# Patient Record
Sex: MALE | Race: WHITE | Employment: OTHER | URBAN - METROPOLITAN AREA
[De-identification: names, ages, dates, MRNs, and addresses within clinical notes are randomized per-mention and may not be internally consistent; named-entity substitution may affect disease eponyms.]

---

## 2018-04-07 ENCOUNTER — APPOINTMENT (OUTPATIENT)
Dept: CT IMAGING | Age: 83
End: 2018-04-07
Payer: COMMERCIAL

## 2018-04-07 ENCOUNTER — HOSPITAL ENCOUNTER (EMERGENCY)
Age: 83
Discharge: HOME | End: 2018-04-08
Attending: EMERGENCY MEDICINE
Payer: COMMERCIAL

## 2018-04-07 DIAGNOSIS — R50.9 FEVER, UNSPECIFIED FEVER CAUSE: ICD-10-CM

## 2018-04-07 DIAGNOSIS — J40 BRONCHITIS: Primary | ICD-10-CM

## 2018-04-07 LAB
ALBUMIN SERPL-MCNC: 3.7 G/DL (ref 3.5–5.2)
ALP BLD-CCNC: 67 U/L (ref 40–129)
ALT SERPL-CCNC: 9 U/L (ref 0–40)
ANION GAP SERPL CALCULATED.3IONS-SCNC: 14 MMOL/L (ref 7–16)
ANISOCYTOSIS: ABNORMAL
APTT: 29.3 SEC (ref 24.5–35.1)
AST SERPL-CCNC: 20 U/L (ref 0–39)
BACTERIA: ABNORMAL /HPF
BASOPHILS ABSOLUTE: 0.05 E9/L (ref 0–0.2)
BASOPHILS RELATIVE PERCENT: 0.5 % (ref 0–2)
BILIRUB SERPL-MCNC: 0.5 MG/DL (ref 0–1.2)
BILIRUBIN URINE: ABNORMAL
BLOOD, URINE: ABNORMAL
BUN BLDV-MCNC: 18 MG/DL (ref 8–23)
CALCIUM SERPL-MCNC: 9.2 MG/DL (ref 8.6–10.2)
CHLORIDE BLD-SCNC: 101 MMOL/L (ref 98–107)
CLARITY: CLEAR
CO2: 24 MMOL/L (ref 22–29)
COLOR: YELLOW
CREAT SERPL-MCNC: 1.1 MG/DL (ref 0.7–1.2)
EKG ATRIAL RATE: 277 BPM
EKG Q-T INTERVAL: 370 MS
EKG QRS DURATION: 78 MS
EKG QTC CALCULATION (BAZETT): 390 MS
EKG R AXIS: -23 DEGREES
EKG T AXIS: 29 DEGREES
EKG VENTRICULAR RATE: 67 BPM
EOSINOPHILS ABSOLUTE: 0.09 E9/L (ref 0.05–0.5)
EOSINOPHILS RELATIVE PERCENT: 1 % (ref 0–6)
EPITHELIAL CELLS, UA: ABNORMAL /HPF
GFR AFRICAN AMERICAN: >60
GFR NON-AFRICAN AMERICAN: >60 ML/MIN/1.73
GLUCOSE BLD-MCNC: 101 MG/DL (ref 74–109)
GLUCOSE URINE: NEGATIVE MG/DL
HCT VFR BLD CALC: 41 % (ref 37–54)
HEMOGLOBIN: 13.4 G/DL (ref 12.5–16.5)
INFLUENZA A BY PCR: NOT DETECTED
INFLUENZA B BY PCR: NOT DETECTED
INR BLD: 1.1
KETONES, URINE: 40 MG/DL
LACTIC ACID: 1 MMOL/L (ref 0.5–2.2)
LEUKOCYTE ESTERASE, URINE: NEGATIVE
LIPASE: 27 U/L (ref 13–60)
LYMPHOCYTES ABSOLUTE: 0.82 E9/L (ref 1.5–4)
LYMPHOCYTES RELATIVE PERCENT: 8.5 % (ref 20–42)
MCH RBC QN AUTO: 31.9 PG (ref 26–35)
MCHC RBC AUTO-ENTMCNC: 32.7 % (ref 32–34.5)
MCV RBC AUTO: 97.6 FL (ref 80–99.9)
MONOCYTES ABSOLUTE: 0.64 E9/L (ref 0.1–0.95)
MONOCYTES RELATIVE PERCENT: 6.5 % (ref 2–12)
NEUTROPHILS ABSOLUTE: 7.64 E9/L (ref 1.8–7.3)
NEUTROPHILS RELATIVE PERCENT: 83.5 % (ref 43–80)
NITRITE, URINE: NEGATIVE
PDW BLD-RTO: 13.8 FL (ref 11.5–15)
PH UA: 5 (ref 5–9)
PLATELET # BLD: 169 E9/L (ref 130–450)
PMV BLD AUTO: 10.4 FL (ref 7–12)
POTASSIUM SERPL-SCNC: 4.4 MMOL/L (ref 3.5–5)
PROTEIN UA: ABNORMAL MG/DL
PROTHROMBIN TIME: 12.2 SEC (ref 9.3–12.4)
RBC # BLD: 4.2 E12/L (ref 3.8–5.8)
RBC UA: ABNORMAL /HPF (ref 0–2)
SODIUM BLD-SCNC: 139 MMOL/L (ref 132–146)
SPECIFIC GRAVITY UA: >=1.03 (ref 1–1.03)
TOTAL PROTEIN: 6.9 G/DL (ref 6.4–8.3)
UROBILINOGEN, URINE: 0.2 E.U./DL
WBC # BLD: 9.1 E9/L (ref 4.5–11.5)
WBC UA: ABNORMAL /HPF (ref 0–5)

## 2018-04-07 PROCEDURE — 87088 URINE BACTERIA CULTURE: CPT

## 2018-04-07 PROCEDURE — 83605 ASSAY OF LACTIC ACID: CPT

## 2018-04-07 PROCEDURE — 85730 THROMBOPLASTIN TIME PARTIAL: CPT

## 2018-04-07 PROCEDURE — 94760 N-INVAS EAR/PLS OXIMETRY 1: CPT

## 2018-04-07 PROCEDURE — 87502 INFLUENZA DNA AMP PROBE: CPT

## 2018-04-07 PROCEDURE — 93005 ELECTROCARDIOGRAM TRACING: CPT | Performed by: EMERGENCY MEDICINE

## 2018-04-07 PROCEDURE — 83690 ASSAY OF LIPASE: CPT

## 2018-04-07 PROCEDURE — 81001 URINALYSIS AUTO W/SCOPE: CPT

## 2018-04-07 PROCEDURE — 2580000003 HC RX 258: Performed by: EMERGENCY MEDICINE

## 2018-04-07 PROCEDURE — 85025 COMPLETE CBC W/AUTO DIFF WBC: CPT

## 2018-04-07 PROCEDURE — 99285 EMERGENCY DEPT VISIT HI MDM: CPT

## 2018-04-07 PROCEDURE — 80053 COMPREHEN METABOLIC PANEL: CPT

## 2018-04-07 PROCEDURE — 87040 BLOOD CULTURE FOR BACTERIA: CPT

## 2018-04-07 PROCEDURE — 36415 COLL VENOUS BLD VENIPUNCTURE: CPT

## 2018-04-07 PROCEDURE — 85610 PROTHROMBIN TIME: CPT

## 2018-04-07 PROCEDURE — 6370000000 HC RX 637 (ALT 250 FOR IP): Performed by: EMERGENCY MEDICINE

## 2018-04-07 RX ORDER — SODIUM CHLORIDE 9 MG/ML
1000 INJECTION, SOLUTION INTRAVENOUS ONCE
Status: COMPLETED | OUTPATIENT
Start: 2018-04-07 | End: 2018-04-08

## 2018-04-07 RX ORDER — ACETAMINOPHEN 325 MG/1
650 TABLET ORAL ONCE
Status: COMPLETED | OUTPATIENT
Start: 2018-04-07 | End: 2018-04-07

## 2018-04-07 RX ADMIN — SODIUM CHLORIDE 1000 ML: 9 INJECTION, SOLUTION INTRAVENOUS at 22:49

## 2018-04-07 RX ADMIN — ACETAMINOPHEN 650 MG: 325 TABLET, FILM COATED ORAL at 22:49

## 2018-04-07 ASSESSMENT — ENCOUNTER SYMPTOMS
ABDOMINAL PAIN: 0
NAUSEA: 0
DIARRHEA: 0
EYE REDNESS: 0
WHEEZING: 0
BACK PAIN: 0
COUGH: 1
EYE DISCHARGE: 0
SINUS PRESSURE: 0
EYE PAIN: 0
SHORTNESS OF BREATH: 0
SORE THROAT: 0
VOMITING: 0

## 2018-04-07 ASSESSMENT — PAIN SCALES - GENERAL: PAINLEVEL_OUTOF10: 0

## 2018-04-08 ENCOUNTER — APPOINTMENT (OUTPATIENT)
Dept: GENERAL RADIOLOGY | Age: 83
End: 2018-04-08
Payer: COMMERCIAL

## 2018-04-08 VITALS
WEIGHT: 193 LBS | RESPIRATION RATE: 18 BRPM | HEART RATE: 95 BPM | DIASTOLIC BLOOD PRESSURE: 54 MMHG | HEIGHT: 69 IN | OXYGEN SATURATION: 95 % | BODY MASS INDEX: 28.58 KG/M2 | SYSTOLIC BLOOD PRESSURE: 111 MMHG | TEMPERATURE: 98.4 F

## 2018-04-08 PROCEDURE — 6370000000 HC RX 637 (ALT 250 FOR IP): Performed by: EMERGENCY MEDICINE

## 2018-04-08 PROCEDURE — 71045 X-RAY EXAM CHEST 1 VIEW: CPT

## 2018-04-08 PROCEDURE — 73502 X-RAY EXAM HIP UNI 2-3 VIEWS: CPT

## 2018-04-08 RX ORDER — AZITHROMYCIN 250 MG/1
500 TABLET, FILM COATED ORAL ONCE
Status: COMPLETED | OUTPATIENT
Start: 2018-04-08 | End: 2018-04-08

## 2018-04-08 RX ORDER — AZITHROMYCIN 250 MG/1
TABLET, FILM COATED ORAL
Qty: 1 PACKET | Refills: 0 | Status: SHIPPED | OUTPATIENT
Start: 2018-04-08 | End: 2018-04-18

## 2018-04-08 RX ADMIN — AZITHROMYCIN 500 MG: 250 TABLET, FILM COATED ORAL at 02:01

## 2018-04-10 LAB — URINE CULTURE, ROUTINE: NORMAL

## 2018-04-13 LAB
BLOOD CULTURE, ROUTINE: NORMAL
CULTURE, BLOOD 2: NORMAL

## 2018-07-14 ENCOUNTER — HOSPITAL ENCOUNTER (OUTPATIENT)
Dept: GENERAL RADIOLOGY | Age: 83
Discharge: HOME | End: 2018-07-16
Payer: COMMERCIAL

## 2018-07-14 ENCOUNTER — HOSPITAL ENCOUNTER (OUTPATIENT)
Age: 83
Discharge: HOME | End: 2018-07-16
Payer: COMMERCIAL

## 2018-07-14 DIAGNOSIS — R52 PAIN: ICD-10-CM

## 2018-07-14 DIAGNOSIS — R07.9 CHEST PAIN, UNSPECIFIED TYPE: ICD-10-CM

## 2018-07-14 PROCEDURE — 73030 X-RAY EXAM OF SHOULDER: CPT

## 2018-07-14 PROCEDURE — 71046 X-RAY EXAM CHEST 2 VIEWS: CPT

## 2018-07-14 PROCEDURE — 71100 X-RAY EXAM RIBS UNI 2 VIEWS: CPT

## 2019-09-09 LAB
AVERAGE GLUCOSE: NORMAL
CHOLESTEROL, TOTAL: 176 MG/DL
CHOLESTEROL/HDL RATIO: 2.6
CREATININE, URINE: 72.06
CREATININE: 1 MG/DL
HBA1C MFR BLD: 6.5 %
HDLC SERPL-MCNC: 67 MG/DL (ref 35–70)
LDL CHOLESTEROL CALCULATED: 99 MG/DL (ref 0–160)
MICROALBUMIN/CREAT 24H UR: 4.9 MG/G{CREAT}
MICROALBUMIN/CREAT UR-RTO: 68
POTASSIUM (K+): 4.1
TRIGL SERPL-MCNC: 48 MG/DL
VLDLC SERPL CALC-MCNC: 10 MG/DL

## 2020-03-17 VITALS — SYSTOLIC BLOOD PRESSURE: 118 MMHG | HEART RATE: 72 BPM | DIASTOLIC BLOOD PRESSURE: 70 MMHG | RESPIRATION RATE: 14 BRPM

## 2020-03-17 RX ORDER — ROSUVASTATIN CALCIUM 5 MG/1
5 TABLET, COATED ORAL DAILY
COMMUNITY
End: 2021-07-08

## 2020-03-17 RX ORDER — BUMETANIDE 1 MG/1
1 TABLET ORAL DAILY
COMMUNITY
End: 2020-12-30

## 2020-03-23 LAB
AVERAGE GLUCOSE: 143
AVERAGE GLUCOSE: 143
CHOLESTEROL, TOTAL: 190 MG/DL
CHOLESTEROL, TOTAL: 190 MG/DL
CHOLESTEROL/HDL RATIO: 3.4
CHOLESTEROL/HDL RATIO: 3.4
CREATININE: 1.2 MG/DL
CREATININE: 1.2 MG/DL
HBA1C MFR BLD: 6.6 %
HBA1C MFR BLD: 6.6 %
HDLC SERPL-MCNC: 56 MG/DL (ref 35–70)
HDLC SERPL-MCNC: 56 MG/DL (ref 35–70)
LDL CHOLESTEROL CALCULATED: 120 MG/DL (ref 0–160)
LDL CHOLESTEROL CALCULATED: 120 MG/DL (ref 0–160)
POTASSIUM (K+): 4.4
POTASSIUM (K+): 4.4
TRIGL SERPL-MCNC: 71 MG/DL
TRIGL SERPL-MCNC: 71 MG/DL
VLDLC SERPL CALC-MCNC: 14 MG/DL
VLDLC SERPL CALC-MCNC: 14 MG/DL

## 2020-06-12 ENCOUNTER — HOSPITAL ENCOUNTER (OUTPATIENT)
Dept: GENERAL RADIOLOGY | Age: 85
Discharge: HOME | End: 2020-06-14
Payer: MEDICARE

## 2020-06-12 ENCOUNTER — HOSPITAL ENCOUNTER (OUTPATIENT)
Age: 85
Discharge: HOME | End: 2020-06-14
Payer: MEDICARE

## 2020-06-12 PROCEDURE — 73502 X-RAY EXAM HIP UNI 2-3 VIEWS: CPT

## 2020-06-12 PROCEDURE — 73552 X-RAY EXAM OF FEMUR 2/>: CPT

## 2020-06-12 PROCEDURE — 73110 X-RAY EXAM OF WRIST: CPT

## 2020-09-15 LAB
INTERNATIONAL NORMALIZATION RATIO, POC: 1
PROTHROMBIN TIME, POC: 12.3

## 2020-09-16 ENCOUNTER — HOSPITAL ENCOUNTER (OUTPATIENT)
Age: 85
Discharge: HOME | End: 2020-09-18
Payer: MEDICARE

## 2020-09-16 VITALS
SYSTOLIC BLOOD PRESSURE: 124 MMHG | HEART RATE: 68 BPM | DIASTOLIC BLOOD PRESSURE: 80 MMHG | RESPIRATION RATE: 14 BRPM | WEIGHT: 183 LBS | BODY MASS INDEX: 27.02 KG/M2 | TEMPERATURE: 96.4 F

## 2020-09-16 LAB
ALBUMIN SERPL-MCNC: 3.8 G/DL (ref 3.5–5.2)
ALP BLD-CCNC: 60 U/L (ref 40–129)
ALT SERPL-CCNC: 9 U/L (ref 0–40)
ANION GAP SERPL CALCULATED.3IONS-SCNC: 18 MMOL/L (ref 7–16)
AST SERPL-CCNC: 23 U/L (ref 0–39)
BASOPHILS ABSOLUTE: 0.06 E9/L (ref 0–0.2)
BASOPHILS RELATIVE PERCENT: 0.7 % (ref 0–2)
BILIRUB SERPL-MCNC: 0.5 MG/DL (ref 0–1.2)
BUN BLDV-MCNC: 17 MG/DL (ref 8–23)
CALCIUM SERPL-MCNC: 10 MG/DL (ref 8.6–10.2)
CHLORIDE BLD-SCNC: 101 MMOL/L (ref 98–107)
CO2: 20 MMOL/L (ref 22–29)
CREAT SERPL-MCNC: 1.1 MG/DL (ref 0.7–1.2)
EOSINOPHILS ABSOLUTE: 0.22 E9/L (ref 0.05–0.5)
EOSINOPHILS RELATIVE PERCENT: 2.6 % (ref 0–6)
GFR AFRICAN AMERICAN: >60
GFR NON-AFRICAN AMERICAN: >60 ML/MIN/1.73
GLUCOSE BLD-MCNC: 150 MG/DL (ref 74–99)
HBA1C MFR BLD: 6.8 % (ref 4–5.6)
HCT VFR BLD CALC: 42.1 % (ref 37–54)
HEMOGLOBIN: 13.8 G/DL (ref 12.5–16.5)
IMMATURE GRANULOCYTES #: 0.07 E9/L
IMMATURE GRANULOCYTES %: 0.8 % (ref 0–5)
LYMPHOCYTES ABSOLUTE: 1.08 E9/L (ref 1.5–4)
LYMPHOCYTES RELATIVE PERCENT: 12.5 % (ref 20–42)
MCH RBC QN AUTO: 32.5 PG (ref 26–35)
MCHC RBC AUTO-ENTMCNC: 32.8 % (ref 32–34.5)
MCV RBC AUTO: 99.3 FL (ref 80–99.9)
MONOCYTES ABSOLUTE: 0.89 E9/L (ref 0.1–0.95)
MONOCYTES RELATIVE PERCENT: 10.3 % (ref 2–12)
NEUTROPHILS ABSOLUTE: 6.3 E9/L (ref 1.8–7.3)
NEUTROPHILS RELATIVE PERCENT: 73.1 % (ref 43–80)
PDW BLD-RTO: 14.3 FL (ref 11.5–15)
PLATELET # BLD: 206 E9/L (ref 130–450)
PMV BLD AUTO: 11.3 FL (ref 7–12)
POTASSIUM SERPL-SCNC: 4.3 MMOL/L (ref 3.5–5)
RBC # BLD: 4.24 E12/L (ref 3.8–5.8)
SODIUM BLD-SCNC: 139 MMOL/L (ref 132–146)
TOTAL PROTEIN: 6.7 G/DL (ref 6.4–8.3)
WBC # BLD: 8.6 E9/L (ref 4.5–11.5)

## 2020-09-16 PROCEDURE — 85025 COMPLETE CBC W/AUTO DIFF WBC: CPT

## 2020-09-16 PROCEDURE — 83036 HEMOGLOBIN GLYCOSYLATED A1C: CPT

## 2020-09-16 PROCEDURE — 80053 COMPREHEN METABOLIC PANEL: CPT

## 2020-09-16 PROCEDURE — 36415 COLL VENOUS BLD VENIPUNCTURE: CPT

## 2020-11-04 ENCOUNTER — TELEPHONE (OUTPATIENT)
Dept: FAMILY MEDICINE CLINIC | Age: 85
End: 2020-11-04

## 2020-12-30 ENCOUNTER — OFFICE VISIT (OUTPATIENT)
Dept: FAMILY MEDICINE CLINIC | Age: 85
End: 2020-12-30
Payer: MEDICARE

## 2020-12-30 VITALS
DIASTOLIC BLOOD PRESSURE: 80 MMHG | OXYGEN SATURATION: 94 % | BODY MASS INDEX: 29.41 KG/M2 | HEIGHT: 66 IN | HEART RATE: 50 BPM | SYSTOLIC BLOOD PRESSURE: 138 MMHG | WEIGHT: 183 LBS

## 2020-12-30 LAB
INTERNATIONAL NORMALIZATION RATIO, POC: 1.3
PROTHROMBIN TIME, POC: NORMAL

## 2020-12-30 PROCEDURE — 99213 OFFICE O/P EST LOW 20 MIN: CPT | Performed by: INTERNAL MEDICINE

## 2020-12-30 PROCEDURE — 85610 PROTHROMBIN TIME: CPT | Performed by: INTERNAL MEDICINE

## 2020-12-30 SDOH — ECONOMIC STABILITY: TRANSPORTATION INSECURITY
IN THE PAST 12 MONTHS, HAS LACK OF TRANSPORTATION KEPT YOU FROM MEETINGS, WORK, OR FROM GETTING THINGS NEEDED FOR DAILY LIVING?: NO

## 2020-12-30 SDOH — ECONOMIC STABILITY: FOOD INSECURITY: WITHIN THE PAST 12 MONTHS, THE FOOD YOU BOUGHT JUST DIDN'T LAST AND YOU DIDN'T HAVE MONEY TO GET MORE.: NEVER TRUE

## 2020-12-30 SDOH — ECONOMIC STABILITY: INCOME INSECURITY: HOW HARD IS IT FOR YOU TO PAY FOR THE VERY BASICS LIKE FOOD, HOUSING, MEDICAL CARE, AND HEATING?: NOT ASKED

## 2020-12-30 SDOH — ECONOMIC STABILITY: FOOD INSECURITY: WITHIN THE PAST 12 MONTHS, YOU WORRIED THAT YOUR FOOD WOULD RUN OUT BEFORE YOU GOT MONEY TO BUY MORE.: NEVER TRUE

## 2020-12-30 SDOH — ECONOMIC STABILITY: TRANSPORTATION INSECURITY
IN THE PAST 12 MONTHS, HAS THE LACK OF TRANSPORTATION KEPT YOU FROM MEDICAL APPOINTMENTS OR FROM GETTING MEDICATIONS?: NO

## 2020-12-30 ASSESSMENT — PATIENT HEALTH QUESTIONNAIRE - PHQ9
SUM OF ALL RESPONSES TO PHQ9 QUESTIONS 1 & 2: 0
2. FEELING DOWN, DEPRESSED OR HOPELESS: 0
1. LITTLE INTEREST OR PLEASURE IN DOING THINGS: 0
SUM OF ALL RESPONSES TO PHQ QUESTIONS 1-9: 0

## 2020-12-30 NOTE — PROGRESS NOTES
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 Tono Woodward  Patient is noncompliant with medications  He is not taking his Coumadin regularly  He has refused Xarelto or Eliquis  Those medications also not safe if he does not take them regularly  He is not sure if he is taking Metformin  His last A1c was 6.6%  No recent fall or injury  Uses a cane to walk    Past Medical History:   Diagnosis Date    A-fib [facility])     BPH (benign prostatic hyperplasia)     Diabetes mellitus ([facility]     Hyperlipidemia     Hypertension     Osteoarthritis     Renal cyst     Type 2 diabetes mellitus without complication ([facility]     Unspecified cerebral artery occlusion with cerebral infarction 10/2010    mild stroke        Social History     Socioeconomic History    Marital status:       Spouse name: Not on file    Number of children: Not on file    Years of education: Not on file    Highest education level: Not on file   Occupational History    Not on file   Social Needs    Financial resource strain: Not on file    Food insecurity     Worry: Never true     Inability: Never true    Transportation needs     Medical: No     Non-medical: No   Tobacco Use    Smoking status: Former Smoker     Packs/day: 1.00     Years: 30.00     Pack years: 30.00     Quit date: 3/17/1998     Years since quittin.8    Smokeless tobacco: Former User     Quit date: 2015   Substance and Sexual Activity    Alcohol use: Yes     Comment: 2 beers daily    Drug use: No    Sexual activity: Not on file   Lifestyle    Physical activity     Days per week: Not on file     Minutes per session: Not on file    Stress: Not on file   Relationships    Social connections     Talks on phone: Not on file     Gets together: Not on file Attends Cheondo[employer] service: Not on file     Active member of club or organization: Not on file     Attends meetings of clubs or organizations: Not on file     Relationship status: Not on file    Intimate partner violence     Fear of current or ex partner: Not on file     Emotionally abused: Not on file     Physically abused: Not on file     Forced sexual activity: Not on file   Other Topics Concern    Not on file   Social History Narrative    Not on file        Past Surgical History:   Procedure Laterality Date    CATARACT REMOVAL WITH IMPLANT Right 11 12 2013    CATARACT REMOVAL WITH IMPLANT Left 03/11/13    EYE SURGERY Right 11/15/13    dislocated intraocular lens    JOINT REPLACEMENT      left hip        Family History   Problem Relation Age of Onset    Asthma Mother     High Blood Pressure Mother     Stroke Mother         Allergies   Allergen Reactions    Lipitor [Atorvastatin Calcium] Other (See Comments)     Muscle cramps    Vytorin [Ezetimibe-Simvastatin]      Leg Cramps          ROS  No acute distress  Cardiac: Denies any chest pain or palpitation history of A. fib seen by Dr. Rosa Ortiz in 2019  Respiratory: Denies any cough or shortness of breath  GI: No abdominal pain. Denies any nausea vomiting or diarrhea  : Denies any dysuria frequency or hematuria history of BPH seen by Dr. Karen Blue  Neuro: No headache or dizziness  Endocrine: diabetes noncompliant  Skin: normal  No recent weight gain or weight loss  Denies any change in vision    Objective:    /80   Pulse 50   Ht 5' 6\" (1.676 m)   Wt 183 lb (83 kg)   SpO2 94%   BMI 29.54 kg/m²     Constitutional: Alert awake and oriented  Eyes: Pupils equal bilaterally. Extraocular muscles intact  Neck: no JVD adenopathy no bruit  Heart:  RRR, no murmurs, gallops, or rubs.   Lungs:    no wheeze, rales or rhonchi  Abd: bowel sounds present, nontender, nondistended, no masses  Extrem:  No clubbing, cyanosis, or edema  Neuro: AAOx3,No Focal deficit  Gait was steady with a cane  Psychological: no depression or anxiety       Current Outpatient Medications   Medication Sig Dispense Refill    rosuvastatin (CRESTOR) 5 MG tablet Take 5 mg by mouth daily      warfarin (COUMADIN) 5 MG tablet Take 5 mg by mouth daily      brimonidine-timolol (COMBIGAN) 0.2-0.5 % ophthalmic solution Place 1 drop into the right eye every 12 hours. 1 Bottle 1    ramipril (ALTACE) 5 MG capsule Take 5 mg by mouth daily.  terazosin (HYTRIN) 5 MG capsule Take 5 mg by mouth nightly. No current facility-administered medications for this visit.          Last 3 BMP  Lab Results   Component Value Date/Time     09/16/2020 03:20 PM     04/07/2018 10:40 PM     09/12/2017 02:45 PM    K 4.3 09/16/2020 03:20 PM    K 4.4 03/23/2020    K 4.4 03/23/2020    K 4.1 09/09/2019    K 4.4 04/07/2018 10:40 PM    K 4.3 09/12/2017 02:45 PM     09/16/2020 03:20 PM     04/07/2018 10:40 PM    CL 99 09/12/2017 02:45 PM    CO2 20 (L) 09/16/2020 03:20 PM    CO2 24 04/07/2018 10:40 PM    CO2 22 09/12/2017 02:45 PM    BUN 17 09/16/2020 03:20 PM    BUN 18 04/07/2018 10:40 PM    BUN 21 09/12/2017 02:45 PM    CREATININE 1.1 09/16/2020 03:20 PM    CREATININE 1.2 03/23/2020    CREATININE 1.2 03/23/2020    CREATININE 1.0 09/09/2019    GLUCOSE 150 (H) 09/16/2020 03:20 PM    GLUCOSE 101 04/07/2018 10:40 PM    GLUCOSE 94 09/12/2017 02:45 PM    GLUCOSE 137 (H) 03/05/2012 08:54 AM    GLUCOSE 87 03/18/2011 07:05 AM    GLUCOSE 117 (H) 12/29/2010 08:37 AM    CALCIUM 10.0 09/16/2020 03:20 PM    CALCIUM 9.2 04/07/2018 10:40 PM    CALCIUM 9.3 09/12/2017 02:45 PM       Last 3 CMP:    Lab Results   Component Value Date/Time     09/16/2020 03:20 PM     04/07/2018 10:40 PM     09/12/2017 02:45 PM    K 4.3 09/16/2020 03:20 PM    K 4.4 03/23/2020    K 4.4 03/23/2020    K 4.1 09/09/2019    K 4.4 04/07/2018 10:40 PM    K 4.3 09/12/2017 02:45 PM     09/16/2020 03:20 PM    CL 101 04/07/2018 10:40 PM    CL 99 09/12/2017 02:45 PM    CO2 20 (L) 09/16/2020 03:20 PM    CO2 24 04/07/2018 10:40 PM    CO2 22 09/12/2017 02:45 PM    BUN 17 09/16/2020 03:20 PM    BUN 18 04/07/2018 10:40 PM    BUN 21 09/12/2017 02:45 PM    CREATININE 1.1 09/16/2020 03:20 PM    CREATININE 1.2 03/23/2020    CREATININE 1.2 03/23/2020    CREATININE 1.0 09/09/2019    GLUCOSE 150 (H) 09/16/2020 03:20 PM    GLUCOSE 101 04/07/2018 10:40 PM    GLUCOSE 94 09/12/2017 02:45 PM    GLUCOSE 137 (H) 03/05/2012 08:54 AM    GLUCOSE 87 03/18/2011 07:05 AM    GLUCOSE 117 (H) 12/29/2010 08:37 AM    CALCIUM 10.0 09/16/2020 03:20 PM    CALCIUM 9.2 04/07/2018 10:40 PM    CALCIUM 9.3 09/12/2017 02:45 PM    PROT 6.7 09/16/2020 03:20 PM    PROT 6.9 04/07/2018 10:40 PM    PROT 6.6 09/12/2017 02:45 PM    LABALBU 3.8 09/16/2020 03:20 PM    LABALBU 3.7 04/07/2018 10:40 PM    LABALBU 3.7 09/12/2017 02:45 PM    LABALBU 4.1 03/05/2012 08:54 AM    LABALBU 4.1 12/29/2010 08:37 AM    BILITOT 0.5 09/16/2020 03:20 PM    BILITOT 0.5 04/07/2018 10:40 PM    BILITOT 0.4 09/12/2017 02:45 PM    ALKPHOS 60 09/16/2020 03:20 PM    ALKPHOS 67 04/07/2018 10:40 PM    ALKPHOS 66 09/12/2017 02:45 PM    AST 23 09/16/2020 03:20 PM    AST 20 04/07/2018 10:40 PM    AST 18 09/12/2017 02:45 PM    ALT 9 09/16/2020 03:20 PM    ALT 9 04/07/2018 10:40 PM    ALT 10 09/12/2017 02:45 PM        CBC:   Lab Results   Component Value Date/Time    WBC 8.6 09/16/2020 03:20 PM    RBC 4.24 09/16/2020 03:20 PM    HGB 13.8 09/16/2020 03:20 PM    HCT 42.1 09/16/2020 03:20 PM    MCV 99.3 09/16/2020 03:20 PM    MCH 32.5 09/16/2020 03:20 PM    MCHC 32.8 09/16/2020 03:20 PM    RDW 14.3 09/16/2020 03:20 PM     09/16/2020 03:20 PM    MPV 11.3 09/16/2020 03:20 PM       A1C:  Lab Results   Component Value Date/Time    LABA1C 6.8 (H) 09/16/2020 03:20 PM       Lipid panel:  Lab Results   Component Value Date    CHOL 190 03/23/2020    CHOL 190 03/23/2020    CHOL 176 09/09/2019    TRIG 71 03/23/2020    TRIG 71 03/23/2020    TRIG 48 09/09/2019    HDL 56 03/23/2020    HDL 56 03/23/2020    HDL 67 09/09/2019        Lab Results   Component Value Date/Time    PROT 6.7 09/16/2020 03:20 PM    PROT 6.9 04/07/2018 10:40 PM    PROT 6.6 09/12/2017 02:45 PM    INR 1.3 12/30/2020 12:24 PM    INR 1.0 09/15/2020    INR 1.1 04/07/2018 10:40 PM    INR 1.7 09/12/2017 02:45 PM    INR 1.5 03/29/2017 07:34 AM       Lab Results   Component Value Date/Time    MG 1.7 09/12/2017 02:45 PM         Assessment. Kodi Farmer was seen today for 3 month follow-up. Diagnoses and all orders for this visit:    Paroxysmal A-fib ([address]  -     POCT INR; Standing  -     ENROLLMENT FOR ANTICOAGULATION MONITORING  -     POCT INR    Controlled type 2 diabetes mellitus without complication, without long-term current use of insulin (HCC)    Hyperlipidemia, unspecified hyperlipidemia type    Benign prostatic hyperplasia without lower urinary tract symptoms    Essential hypertension    Pacemaker       Patient Active Problem List   Diagnosis    Right cataract    Dislocated IOL (intraocular lens), posterior    Cataract of left eye       Plan: Is supposed to take Coumadin 5 mg alternating with 6 mg he is not doing it regularly  Advised to take 6 mg daily he has 5 mg tablet and 2 mg tablet  Be more compliant risk of stroke discussed  He is refusing Xarelto or Eliquis if he is noncompliant with that that is also risky    Bring medication bottles every visit    Patient was advised his daughter call me if any question so we can discuss    Check PT/INR once a month  Follow with a cardiologist  Blood work next visit    Return in about 3 months (around 3/30/2021).        Gayarti Swift MD  12:50 PM  12/30/2020     [place]

## 2021-03-05 RX ORDER — WARFARIN SODIUM 2 MG/1
1 TABLET ORAL DAILY
Qty: 45 TABLET | Refills: 1 | Status: SHIPPED
Start: 2021-03-05 | End: 2021-08-23

## 2021-03-05 RX ORDER — WARFARIN SODIUM 5 MG/1
5 TABLET ORAL DAILY
Qty: 90 TABLET | Refills: 1 | Status: SHIPPED
Start: 2021-03-05 | End: 2021-08-23

## 2021-03-09 ENCOUNTER — OFFICE VISIT (OUTPATIENT)
Dept: FAMILY MEDICINE CLINIC | Age: 86
End: 2021-03-09
Payer: MEDICARE

## 2021-03-09 VITALS
OXYGEN SATURATION: 95 % | BODY MASS INDEX: 29.54 KG/M2 | WEIGHT: 183 LBS | DIASTOLIC BLOOD PRESSURE: 70 MMHG | TEMPERATURE: 97.8 F | HEART RATE: 61 BPM | SYSTOLIC BLOOD PRESSURE: 110 MMHG

## 2021-03-09 DIAGNOSIS — I48.20 CHRONIC ATRIAL FIBRILLATION (HCC): ICD-10-CM

## 2021-03-09 DIAGNOSIS — E11.9 TYPE 2 DIABETES MELLITUS WITHOUT COMPLICATION, WITHOUT LONG-TERM CURRENT USE OF INSULIN (HCC): ICD-10-CM

## 2021-03-09 DIAGNOSIS — E78.5 HYPERLIPIDEMIA, UNSPECIFIED HYPERLIPIDEMIA TYPE: ICD-10-CM

## 2021-03-09 DIAGNOSIS — I48.0 PAROXYSMAL A-FIB (HCC): ICD-10-CM

## 2021-03-09 DIAGNOSIS — M19.90 INFLAMMATORY ARTHRITIS: ICD-10-CM

## 2021-03-09 DIAGNOSIS — I10 ESSENTIAL HYPERTENSION: Primary | ICD-10-CM

## 2021-03-09 DIAGNOSIS — Z12.5 PROSTATE CANCER SCREENING: ICD-10-CM

## 2021-03-09 LAB
INTERNATIONAL NORMALIZATION RATIO, POC: 1.2
PROTHROMBIN TIME, POC: NORMAL

## 2021-03-09 PROCEDURE — 85610 PROTHROMBIN TIME: CPT | Performed by: INTERNAL MEDICINE

## 2021-03-09 PROCEDURE — 99213 OFFICE O/P EST LOW 20 MIN: CPT | Performed by: INTERNAL MEDICINE

## 2021-03-09 RX ORDER — PREDNISONE 1 MG/1
10 TABLET ORAL DAILY
Qty: 15 TABLET | Refills: 0 | Status: SHIPPED | OUTPATIENT
Start: 2021-03-09 | End: 2021-03-14

## 2021-03-09 ASSESSMENT — PATIENT HEALTH QUESTIONNAIRE - PHQ9
SUM OF ALL RESPONSES TO PHQ QUESTIONS 1-9: 0
SUM OF ALL RESPONSES TO PHQ9 QUESTIONS 1 & 2: 0
1. LITTLE INTEREST OR PLEASURE IN DOING THINGS: 0
SUM OF ALL RESPONSES TO PHQ QUESTIONS 1-9: 0
2. FEELING DOWN, DEPRESSED OR HOPELESS: 0
SUM OF ALL RESPONSES TO PHQ QUESTIONS 1-9: 0

## 2021-03-09 NOTE — PROGRESS NOTES
Subjective:     Chief Complaint   Patient presents with    Finger Pain   complains of pain in the right hand it is contracted fingers very stiff in the morning appears to have severe arthritis  he cannot open the bottle uses a cane to walk    follow-up on atrial fibrillation he is not taking his Coumadin as prescribed he missed it for few days    I have discussed with him to try Eliquis or Xarelto he refused the pain advised to talk to the cardiologist  patient like to stay on the Coumadin he says do a better job she understand the risk of stroke his INR has been subtherapeutic    he has not had his Coumadin checked for some time advised him to check in 2 weeks    I have discussed on many visits to stop the Coumadin and go on Eliquis or Xarelto he declined again today he says he will miss those also further not safe       Past Medical History:   Diagnosis Date    [facility]fib ([facility]     BPH (benign prostatic hyperplasia)     Diabetes mellitus ([facility]     Hyperlipidemia     Hypertension     Osteoarthritis     Renal cyst     Type 2 diabetes mellitus without complication ([facility]     Unspecified cerebral artery occlusion with cerebral infarction 10/2010    mild stroke        Social History     Socioeconomic History    Marital status:       Spouse name: Not on file    Number of children: Not on file    Years of education: Not on file    Highest education level: Not on file   Occupational History    Not on file   Social Needs    Financial resource strain: Not on file    Food insecurity     Worry: Never true     Inability: Never true    Transportation needs     Medical: No     Non-medical: No   Tobacco Use    Smoking status: Former Smoker     Packs/day: 1.00     Years: 30.00     Pack years: 30.00     Quit date: 3/17/1998     Years since quittin.9    Smokeless tobacco: Former User     Quit date: 2015   Substance and Sexual Activity    Alcohol use: Yes     Comment: 2 beers daily    Drug use: No    Sexual activity: Not on file   Lifestyle    Physical activity     Days per week: Not on file     Minutes per session: Not on file    Stress: Not on file   Relationships    Social connections     Talks on phone: Not on file     Gets together: Not on file     Attends Congregational service: Not on file     Active member of club or organization: Not on file     Attends meetings of clubs or organizations: Not on file     Relationship status: Not on file    Intimate partner violence     Fear of current or ex partner: Not on file     Emotionally abused: Not on file     Physically abused: Not on file     Forced sexual activity: Not on file   Other Topics Concern    Not on file   Social History Narrative    Not on file        Past Surgical History:   Procedure Laterality Date    CATARACT REMOVAL WITH IMPLANT Right 11 12 2013    CATARACT REMOVAL WITH IMPLANT Left 03/11/13    EYE SURGERY Right 11/15/13    dislocated intraocular lens    JOINT REPLACEMENT      left hip        Family History   Problem Relation Age of Onset    Asthma Mother     High Blood Pressure Mother     Stroke Mother         Allergies   Allergen Reactions    Lipitor [Atorvastatin Calcium] Other (See Comments)     Muscle cramps    Vytorin [Ezetimibe-Simvastatin]      Leg Cramps          ROS  No acute distress  Cardiac: Denies any chest pain or palpitation  Respiratory: Denies any cough or shortness of breath  GI: No abdominal pain. Denies any nausea vomiting or diarrhea  : Denies any dysuria frequency or hematuria  Neuro: No headache or dizziness  Endocrine: No diabetes  Skin: normal  No recent weight gain or weight loss  Denies any change in vision  musculoskeletal joint pains worse in both hands right worse than left  Objective:    /70   Pulse 61   Temp 97.8 °F (36.6 °C)   Wt 183 lb (83 kg)   SpO2 95%   BMI 29.54 kg/m²     Constitutional: Alert awake and oriented  Eyes: Pupils equal bilaterally.  Extraocular muscles intact  Neck: no GLUCOSE 87 03/18/2011 07:05 AM    GLUCOSE 117 (H) 12/29/2010 08:37 AM    CALCIUM 10.0 09/16/2020 03:20 PM    CALCIUM 9.2 04/07/2018 10:40 PM    CALCIUM 9.3 09/12/2017 02:45 PM       Last 3 CMP:    Lab Results   Component Value Date/Time     09/16/2020 03:20 PM     04/07/2018 10:40 PM     09/12/2017 02:45 PM    K 4.3 09/16/2020 03:20 PM    K 4.4 03/23/2020    K 4.4 03/23/2020    K 4.1 09/09/2019    K 4.4 04/07/2018 10:40 PM    K 4.3 09/12/2017 02:45 PM     09/16/2020 03:20 PM     04/07/2018 10:40 PM    CL 99 09/12/2017 02:45 PM    CO2 20 (L) 09/16/2020 03:20 PM    CO2 24 04/07/2018 10:40 PM    CO2 22 09/12/2017 02:45 PM    BUN 17 09/16/2020 03:20 PM    BUN 18 04/07/2018 10:40 PM    BUN 21 09/12/2017 02:45 PM    CREATININE 1.1 09/16/2020 03:20 PM    CREATININE 1.2 03/23/2020    CREATININE 1.2 03/23/2020    CREATININE 1.0 09/09/2019    GLUCOSE 150 (H) 09/16/2020 03:20 PM    GLUCOSE 101 04/07/2018 10:40 PM    GLUCOSE 94 09/12/2017 02:45 PM    GLUCOSE 137 (H) 03/05/2012 08:54 AM    GLUCOSE 87 03/18/2011 07:05 AM    GLUCOSE 117 (H) 12/29/2010 08:37 AM    CALCIUM 10.0 09/16/2020 03:20 PM    CALCIUM 9.2 04/07/2018 10:40 PM    CALCIUM 9.3 09/12/2017 02:45 PM    PROT 6.7 09/16/2020 03:20 PM    PROT 6.9 04/07/2018 10:40 PM    PROT 6.6 09/12/2017 02:45 PM    LABALBU 3.8 09/16/2020 03:20 PM    LABALBU 3.7 04/07/2018 10:40 PM    LABALBU 3.7 09/12/2017 02:45 PM    LABALBU 4.1 03/05/2012 08:54 AM    LABALBU 4.1 12/29/2010 08:37 AM    BILITOT 0.5 09/16/2020 03:20 PM    BILITOT 0.5 04/07/2018 10:40 PM    BILITOT 0.4 09/12/2017 02:45 PM    ALKPHOS 60 09/16/2020 03:20 PM    ALKPHOS 67 04/07/2018 10:40 PM    ALKPHOS 66 09/12/2017 02:45 PM    AST 23 09/16/2020 03:20 PM    AST 20 04/07/2018 10:40 PM    AST 18 09/12/2017 02:45 PM    ALT 9 09/16/2020 03:20 PM    ALT 9 04/07/2018 10:40 PM    ALT 10 09/12/2017 02:45 PM        CBC:   Lab Results   Component Value Date/Time    WBC 8.6 09/16/2020 03:20 PM    RBC DAILY       Patient Active Problem List   Diagnosis    Right cataract    Dislocated IOL (intraocular lens), posterior    Cataract of left eye       Plan: Try Medrol Dosepak  to take the Coumadin as prescribed check pro time in 2 weeks he takes 5 mg daily for 5 days a week and 7 mg Wednesday and Sunday check in 2 weeks he promised to do so risk of stroke discussed if not follow-up properly    prednisone taper dose see if that helps the arthritis in the hands and swollen joints: Check sed rate CRP and rheumatoid factor    Diabetes control check A1c    Get complete blood work before next visit    Advised the patient daughter to call me so I can discuss with her    Return in about 3 months (around 6/9/2021).        Renny Dickey MD  3:47 PM  3/9/2021     [place]

## 2021-03-11 ENCOUNTER — TELEPHONE (OUTPATIENT)
Dept: FAMILY MEDICINE CLINIC | Age: 86
End: 2021-03-11

## 2021-04-22 ENCOUNTER — HOSPITAL ENCOUNTER (EMERGENCY)
Age: 86
Discharge: HOME | End: 2021-04-22
Attending: EMERGENCY MEDICINE
Payer: MEDICARE

## 2021-04-22 VITALS
TEMPERATURE: 98.4 F | BODY MASS INDEX: 29.54 KG/M2 | OXYGEN SATURATION: 96 % | WEIGHT: 183 LBS | SYSTOLIC BLOOD PRESSURE: 184 MMHG | RESPIRATION RATE: 16 BRPM | DIASTOLIC BLOOD PRESSURE: 118 MMHG | HEART RATE: 60 BPM

## 2021-04-22 DIAGNOSIS — R35.0 URINARY FREQUENCY: Primary | ICD-10-CM

## 2021-04-22 LAB
BACTERIA: NORMAL /HPF
BILIRUBIN URINE: NEGATIVE
BLOOD, URINE: ABNORMAL
CLARITY: CLEAR
COLOR: YELLOW
GLUCOSE URINE: NEGATIVE MG/DL
KETONES, URINE: ABNORMAL MG/DL
LEUKOCYTE ESTERASE, URINE: NEGATIVE
NITRITE, URINE: NEGATIVE
PH UA: 5.5 (ref 5–9)
PROTEIN UA: NEGATIVE MG/DL
RBC UA: NORMAL /HPF (ref 0–2)
SPECIFIC GRAVITY UA: >=1.03 (ref 1–1.03)
UROBILINOGEN, URINE: 0.2 E.U./DL
WBC UA: NORMAL /HPF (ref 0–5)

## 2021-04-22 PROCEDURE — 51798 US URINE CAPACITY MEASURE: CPT

## 2021-04-22 PROCEDURE — 99283 EMERGENCY DEPT VISIT LOW MDM: CPT

## 2021-04-22 PROCEDURE — 81001 URINALYSIS AUTO W/SCOPE: CPT

## 2021-04-22 ASSESSMENT — ENCOUNTER SYMPTOMS
BACK PAIN: 0
SORE THROAT: 0
WHEEZING: 0
SINUS PRESSURE: 0
ABDOMINAL PAIN: 0
DIARRHEA: 0
VOMITING: 0
SHORTNESS OF BREATH: 0
EYE PAIN: 0
COUGH: 0
NAUSEA: 0
EYE DISCHARGE: 0
EYE REDNESS: 0

## 2021-04-22 NOTE — ED NOTES
Patient voided, urine sample collected and labeled at bedside with 2 identifiers, US completed to measure urine left- 32 ml - Dr. Meryle Jarvis aware.       Karine Gonzalez RN  04/22/21 [id number]

## 2021-04-22 NOTE — ED PROVIDER NOTES
Patient is an 81 y/o male who presents to the ED via EMS with difficulty urinating. Patient states that he has difficulty urinating in the mornings. He states that it usually takes him 3 times and then it will \"let loose. \" He states that he was up all night. He did urinate this morning. He denies any abdominal pain or suprapubic pressure. He denies any fever or flank pain. He denies any dysuria or gross hematuria. He denies any history of prostate problems. Review of Systems   Constitutional: Negative for chills and fever. HENT: Negative for ear pain, sinus pressure and sore throat. Eyes: Negative for pain, discharge and redness. Respiratory: Negative for cough, shortness of breath and wheezing. Cardiovascular: Negative for chest pain. Gastrointestinal: Negative for abdominal pain, diarrhea, nausea and vomiting. Genitourinary: Positive for difficulty urinating. Negative for dysuria and frequency. Musculoskeletal: Negative for arthralgias and back pain. Skin: Negative for rash and wound. Neurological: Negative for weakness and headaches. Hematological: Negative for adenopathy. All other systems reviewed and are negative. Physical Exam  Vitals signs and nursing note reviewed. Constitutional:       General: He is not in acute distress. HENT:      Head: Normocephalic and atraumatic. Right Ear: External ear normal.      Left Ear: External ear normal.      Nose: Nose normal.      Mouth/Throat:      Mouth: Mucous membranes are moist.   Eyes:      Conjunctiva/sclera: Conjunctivae normal.      Pupils: Pupils are equal, round, and reactive to light. Neck:      Musculoskeletal: Normal range of motion and neck supple. Cardiovascular:      Rate and Rhythm: Normal rate and regular rhythm. Heart sounds: No murmur. Pulmonary:      Effort: Pulmonary effort is normal. No respiratory distress. Breath sounds: Normal breath sounds. No stridor.  No wheezing, rhonchi or Ur Negative Negative mg/dL    Bilirubin Urine Negative Negative    Ketones, Urine TRACE (A) Negative mg/dL    Specific Gravity, UA >=1.030 1.005 - 1.030    Blood, Urine TRACE (A) Negative    pH, UA 5.5 5.0 - 9.0    Protein, UA Negative Negative mg/dL    Urobilinogen, Urine 0.2 <2.0 E.U./dL    Nitrite, Urine Negative Negative    Leukocyte Esterase, Urine Negative Negative       Radiology:  No orders to display       ------------------------- NURSING NOTES AND VITALS REVIEWED ---------------------------  Date / Time Roomed:  4/22/2021  6:17 AM  ED Bed Assignment:  08/08    The nursing notes within the ED encounter and vital signs as below have been reviewed. BP (!) 184/118   Pulse 60   Temp 98.4 °F (36.9 °C) (Oral)   Resp 16   Wt 183 lb (83 kg)   SpO2 96%   BMI 29.54 kg/m²   Oxygen Saturation Interpretation: Normal      ------------------------------------------ PROGRESS NOTES ------------------------------------------  I have spoken with the patient and discussed todays results, in addition to providing specific details for the plan of care and counseling regarding the diagnosis and prognosis. Their questions are answered at this time and they are agreeable with the plan. I discussed at length with them reasons for immediate return here for re evaluation. They will followup with primary care by calling their office tomorrow. --------------------------------- ADDITIONAL PROVIDER NOTES ---------------------------------  At this time the patient is without objective evidence of an acute process requiring hospitalization or inpatient management. They have remained hemodynamically stable throughout their entire ED visit and are stable for discharge with outpatient follow-up. The plan has been discussed in detail and they are aware of the specific conditions for emergent return, as well as the importance of follow-up. New Prescriptions    No medications on file       Diagnosis:  1.  Urinary frequency        Disposition:  Patient's disposition: Discharge to home  Patient's condition is stable.          [address] [facility]  04/22/21 [id number]

## 2021-04-28 LAB — DIABETIC RETINOPATHY: POSITIVE

## 2021-07-08 ENCOUNTER — OFFICE VISIT (OUTPATIENT)
Dept: FAMILY MEDICINE CLINIC | Age: 86
End: 2021-07-08
Payer: MEDICARE

## 2021-07-08 VITALS
WEIGHT: 173 LBS | DIASTOLIC BLOOD PRESSURE: 80 MMHG | OXYGEN SATURATION: 96 % | TEMPERATURE: 97.5 F | BODY MASS INDEX: 27.92 KG/M2 | HEART RATE: 63 BPM | SYSTOLIC BLOOD PRESSURE: 136 MMHG

## 2021-07-08 DIAGNOSIS — R73.9 HYPERGLYCEMIA: ICD-10-CM

## 2021-07-08 DIAGNOSIS — E78.5 HYPERLIPIDEMIA, UNSPECIFIED HYPERLIPIDEMIA TYPE: ICD-10-CM

## 2021-07-08 DIAGNOSIS — I10 ESSENTIAL HYPERTENSION: ICD-10-CM

## 2021-07-08 DIAGNOSIS — I48.0 PAROXYSMAL A-FIB (HCC): Primary | ICD-10-CM

## 2021-07-08 DIAGNOSIS — Z12.5 PROSTATE CANCER SCREENING: ICD-10-CM

## 2021-07-08 DIAGNOSIS — M19.90 INFLAMMATORY ARTHRITIS: ICD-10-CM

## 2021-07-08 LAB
INTERNATIONAL NORMALIZATION RATIO, POC: 1.1
PROTHROMBIN TIME, POC: NORMAL

## 2021-07-08 PROCEDURE — 85610 PROTHROMBIN TIME: CPT | Performed by: INTERNAL MEDICINE

## 2021-07-08 PROCEDURE — 99213 OFFICE O/P EST LOW 20 MIN: CPT | Performed by: INTERNAL MEDICINE

## 2021-07-08 RX ORDER — RAMIPRIL 2.5 MG/1
CAPSULE ORAL
COMMUNITY
Start: 2021-04-02 | End: 2021-08-23 | Stop reason: SDUPTHER

## 2021-07-08 NOTE — PROGRESS NOTES
Subjective:     Chief Complaint   Patient presents with    Joint Pain   Patient complains of generalized joint pains he was supposed to get the blood work he did not do it    He says he forgets to take his Coumadin many times  When he takes it he is taking 6 mg every day  He brought his bottles today he has Coumadin 5 mg and 2 mg  He takes 5 mg past half of 2 mg  His INR has been low  He is not protected  He says he forgets many times when he goes to his daughter    He has a daughter who is a physician assistant I told him I can talk to her  He says she is too busy running for clinics  I cannot contact her    I tried to call his daughter Velvet Tinoco  [phone]  Could not contact that number  Tried with different options  Dialing 1 before that  It says that the area code before dialing  Was not able to contact her  To get information and talk to her about the father's condition    Past Medical History:   Diagnosis Date    A-fib [facility])     BPH (benign prostatic hyperplasia)     Diabetes mellitus ([facility]     Hyperlipidemia     Hypertension     Osteoarthritis     Renal cyst     Type 2 diabetes mellitus without complication ([facility]     Unspecified cerebral artery occlusion with cerebral infarction 10/2010    mild stroke        Social History     Socioeconomic History    Marital status:       Spouse name: Not on file    Number of children: Not on file    Years of education: Not on file    Highest education level: Not on file   Occupational History    Not on file   Tobacco Use    Smoking status: Former Smoker     Packs/day: 1.00     Years: 30.00     Pack years: 30.00     Quit date: 3/17/1998     Years since quittin.3    Smokeless tobacco: Former User     Quit date: 2015   Substance and Sexual Activity    Alcohol use: Yes     Comment: 2 beers daily    Drug use: No    Sexual activity: Not on file   Other Topics Concern    Not on file   Social History Narrative    Not on file     Social Determinants of Health     Financial Resource Strain:     Difficulty of Paying Living Expenses:    Food Insecurity: No Food Insecurity    Worried About Running Out of Food in the Last Year: Never true    Ran Out of Food in the Last Year: Never true   Transportation Needs: No Transportation Needs    Lack of Transportation (Medical): No    Lack of Transportation (Non-Medical): No   Physical Activity:     Days of Exercise per Week:     Minutes of Exercise per Session:    Stress:     Feeling of Stress :    Social Connections:     Frequency of Communication with Friends and Family:     Frequency of Social Gatherings with Friends and Family:     Attends Jehovah's witness Services:     Active Member of Clubs or Organizations:     Attends Club or Organization Meetings:     Marital Status:    Intimate Partner Violence:     Fear of Current or Ex-Partner:     Emotionally Abused:     Physically Abused:     Sexually Abused:         Past Surgical History:   Procedure Laterality Date    CATARACT REMOVAL WITH IMPLANT Right 11 12 2013    CATARACT REMOVAL WITH IMPLANT Left 03/11/13    EYE SURGERY Right 11/15/13    dislocated intraocular lens    JOINT REPLACEMENT      left hip        Family History   Problem Relation Age of Onset    Asthma Mother     High Blood Pressure Mother     Stroke Mother         Allergies   Allergen Reactions    Lipitor [Atorvastatin Calcium] Other (See Comments)     Muscle cramps    Vytorin [Ezetimibe-Simvastatin]      Leg Cramps          ROS  No acute distress  Cardiac: Denies any chest pain or palpitation  Atrial fibrillation has seen Dr. Celsa Montoya in the past  He has refused cardiac catheterization to Dr. Celsa Montoya  Pacemaker 2017 last seen Dr. Celsa Montoya in 2019 has not followed up  Respiratory: Denies any cough or shortness of breath  GI: No abdominal pain. Denies any nausea vomiting or diarrhea  He has declined colonoscopy many times  : Denies any dysuria frequency or hematuria  Neuro:  No headache or dizziness  Endocrine: No diabetes  Skin: normal  No recent weight gain or weight loss  Denies any change in vision    Objective:    /80   Pulse 63   Temp 97.5 °F (36.4 °C)   Wt 173 lb (78.5 kg)   SpO2 96%   BMI 27.92 kg/m²     Constitutional: Alert awake and oriented  Eyes: Pupils equal bilaterally. Extraocular muscles intact  Neck: no JVD adenopathy no bruit  Heart:  RRR, no murmurs, gallops, or rubs. Lungs:    no wheeze, rales or rhonchi  Abd: bowel sounds present, nontender, nondistended, no masses  Extrem:  No clubbing, cyanosis, or edema  Neuro: AAOx3,No Focal deficit  Carotid ultrasound March 2017 less than 50%  Walks with a cane due to arthritis  Psychological: no depression or anxiety   Arthritis both sides  Both wrist    Current Outpatient Medications   Medication Sig Dispense Refill    ramipril (ALTACE) 2.5 MG capsule TAKE ONE CAPSULE BY MOUTH EVERY DAY      warfarin (COUMADIN) 5 MG tablet Take 1 tablet by mouth daily 90 tablet 1    warfarin (COUMADIN) 2 MG tablet Take 0.5 tablets by mouth daily 45 tablet 1    brimonidine-timolol (COMBIGAN) 0.2-0.5 % ophthalmic solution Place 1 drop into the right eye every 12 hours. 1 Bottle 1     No current facility-administered medications for this visit.         Last 3 BMP  Lab Results   Component Value Date/Time     09/16/2020 03:20 PM     04/07/2018 10:40 PM     09/12/2017 02:45 PM    K 4.3 09/16/2020 03:20 PM    K 4.4 03/23/2020 12:00 AM    K 4.4 03/23/2020 12:00 AM    K 4.1 09/09/2019 12:00 AM    K 4.4 04/07/2018 10:40 PM    K 4.3 09/12/2017 02:45 PM     09/16/2020 03:20 PM     04/07/2018 10:40 PM    CL 99 09/12/2017 02:45 PM    CO2 20 (L) 09/16/2020 03:20 PM    CO2 24 04/07/2018 10:40 PM    CO2 22 09/12/2017 02:45 PM    BUN 17 09/16/2020 03:20 PM    BUN 18 04/07/2018 10:40 PM    BUN 21 09/12/2017 02:45 PM    CREATININE 1.1 09/16/2020 03:20 PM    CREATININE 1.2 03/23/2020 12:00 AM    CREATININE 1.2 03/23/2020 12:00 AM    CREATININE 1.0 09/09/2019 12:00 AM    GLUCOSE 150 (H) 09/16/2020 03:20 PM    GLUCOSE 101 04/07/2018 10:40 PM    GLUCOSE 94 09/12/2017 02:45 PM    GLUCOSE 137 (H) 03/05/2012 08:54 AM    GLUCOSE 87 03/18/2011 07:05 AM    GLUCOSE 117 (H) 12/29/2010 08:37 AM    CALCIUM 10.0 09/16/2020 03:20 PM    CALCIUM 9.2 04/07/2018 10:40 PM    CALCIUM 9.3 09/12/2017 02:45 PM       Last 3 CMP:    Lab Results   Component Value Date/Time     09/16/2020 03:20 PM     04/07/2018 10:40 PM     09/12/2017 02:45 PM    K 4.3 09/16/2020 03:20 PM    K 4.4 03/23/2020 12:00 AM    K 4.4 03/23/2020 12:00 AM    K 4.1 09/09/2019 12:00 AM    K 4.4 04/07/2018 10:40 PM    K 4.3 09/12/2017 02:45 PM     09/16/2020 03:20 PM     04/07/2018 10:40 PM    CL 99 09/12/2017 02:45 PM    CO2 20 (L) 09/16/2020 03:20 PM    CO2 24 04/07/2018 10:40 PM    CO2 22 09/12/2017 02:45 PM    BUN 17 09/16/2020 03:20 PM    BUN 18 04/07/2018 10:40 PM    BUN 21 09/12/2017 02:45 PM    CREATININE 1.1 09/16/2020 03:20 PM    CREATININE 1.2 03/23/2020 12:00 AM    CREATININE 1.2 03/23/2020 12:00 AM    CREATININE 1.0 09/09/2019 12:00 AM    GLUCOSE 150 (H) 09/16/2020 03:20 PM    GLUCOSE 101 04/07/2018 10:40 PM    GLUCOSE 94 09/12/2017 02:45 PM    GLUCOSE 137 (H) 03/05/2012 08:54 AM    GLUCOSE 87 03/18/2011 07:05 AM    GLUCOSE 117 (H) 12/29/2010 08:37 AM    CALCIUM 10.0 09/16/2020 03:20 PM    CALCIUM 9.2 04/07/2018 10:40 PM    CALCIUM 9.3 09/12/2017 02:45 PM    PROT 6.7 09/16/2020 03:20 PM    PROT 6.9 04/07/2018 10:40 PM    PROT 6.6 09/12/2017 02:45 PM    LABALBU 3.8 09/16/2020 03:20 PM    LABALBU 3.7 04/07/2018 10:40 PM    LABALBU 3.7 09/12/2017 02:45 PM    LABALBU 4.1 03/05/2012 08:54 AM    LABALBU 4.1 12/29/2010 08:37 AM    BILITOT 0.5 09/16/2020 03:20 PM    BILITOT 0.5 04/07/2018 10:40 PM    BILITOT 0.4 09/12/2017 02:45 PM    ALKPHOS 60 09/16/2020 03:20 PM    ALKPHOS 67 04/07/2018 10:40 PM    ALKPHOS 66 09/12/2017 02:45 PM    AST 23 09/16/2020 03:20 PM    AST 20 04/07/2018 10:40 PM    AST 18 09/12/2017 02:45 PM    ALT 9 09/16/2020 03:20 PM    ALT 9 04/07/2018 10:40 PM    ALT 10 09/12/2017 02:45 PM        CBC:   Lab Results   Component Value Date/Time    WBC 8.6 09/16/2020 03:20 PM    RBC 4.24 09/16/2020 03:20 PM    HGB 13.8 09/16/2020 03:20 PM    HCT 42.1 09/16/2020 03:20 PM    MCV 99.3 09/16/2020 03:20 PM    MCH 32.5 09/16/2020 03:20 PM    MCHC 32.8 09/16/2020 03:20 PM    RDW 14.3 09/16/2020 03:20 PM     09/16/2020 03:20 PM    MPV 11.3 09/16/2020 03:20 PM       A1C:  Lab Results   Component Value Date/Time    LABA1C 6.8 (H) 09/16/2020 03:20 PM       Lipid panel:  Lab Results   Component Value Date    CHOL 190 03/23/2020    CHOL 190 03/23/2020    CHOL 176 09/09/2019    TRIG 71 03/23/2020    TRIG 71 03/23/2020    TRIG 48 09/09/2019    HDL 56 03/23/2020    HDL 56 03/23/2020    HDL 67 09/09/2019        Lab Results   Component Value Date/Time    PROT 6.7 09/16/2020 03:20 PM    PROT 6.9 04/07/2018 10:40 PM    PROT 6.6 09/12/2017 02:45 PM    INR 1.1 07/08/2021 04:10 PM    INR 1.2 03/09/2021 02:37 PM    INR 1.3 12/30/2020 12:24 PM    INR 1.1 04/07/2018 10:40 PM    INR 1.7 09/12/2017 02:45 PM    INR 1.5 03/29/2017 07:34 AM       Lab Results   Component Value Date/Time    MG 1.7 09/12/2017 02:45 PM         Assessment. Diana Cummings was seen today for joint pain. Diagnoses and all orders for this visit:    Paroxysmal A-fib ([address]  -     POCT INR    Inflammatory arthritis  -     CBC WITH AUTO DIFFERENTIAL; Future  -     COMPREHENSIVE METABOLIC PANEL; Future  -     C-REACTIVE PROTEIN; Future  -     Sedimentation Rate; Future  -     RHEUMATOID FACTOR; Future    Essential hypertension    Prostate cancer screening  -     PSA SCREENING; Future    Hyperlipidemia, unspecified hyperlipidemia type  -     COMPREHENSIVE METABOLIC PANEL; Future  -     LIPID PANEL; Future  -     TSH;  Future    Hyperglycemia  -     HEMOGLOBIN A1C; Future       Patient Active Problem List Diagnosis    Right cataract    Dislocated IOL (intraocular lens), posterior    Cataract of left eye    Essential hypertension    Inflammatory arthritis    Paroxysmal A-fib (HCC)    Hyperlipidemia    Hyperglycemia       Plan: Atrial fibrillation noncompliant with the Coumadin INR 1.1 today  He says he is taking 60 mg every day but  He misses many times  I discussed with him he should try Eliquis, he told me to talk to his daughter Aminta Lion I tried to contact her not able to contact on that number  [phone]  I called the patient [phone]  I will try it again    Hypertension controlled continue ramipril    History of diabetes check A1c    Arthritis rule out rheumatoid arthritis check sed rate CRP and rheumatoid factor      Need to check CMP and lipid profile    Fall precautions discussed      Check pro time in 1 week  If he does not go on Eliquis    Addendum  Finally I was able to get hold of him  He gave me phone number for his daughter Aminta Lion  [phone]  [facility]  [phone]    I called Aminta Lion and talk to her she is going to talk to her sister Romana Leon is a physician assistant and make a plan and get back to me    Return in about 3 months (around 10/8/2021).        Tami Mendoza MD  6:01 PM  7/8/2021     [place]

## 2021-07-09 ENCOUNTER — TELEPHONE (OUTPATIENT)
Dept: FAMILY MEDICINE CLINIC | Age: 86
End: 2021-07-09

## 2021-07-09 DIAGNOSIS — Z12.5 PROSTATE CANCER SCREENING: ICD-10-CM

## 2021-07-09 DIAGNOSIS — M19.90 INFLAMMATORY ARTHRITIS: ICD-10-CM

## 2021-07-09 DIAGNOSIS — R73.9 HYPERGLYCEMIA: ICD-10-CM

## 2021-07-09 DIAGNOSIS — E78.5 HYPERLIPIDEMIA, UNSPECIFIED HYPERLIPIDEMIA TYPE: ICD-10-CM

## 2021-07-09 LAB
ALBUMIN SERPL-MCNC: 4.2 G/DL (ref 3.5–5.2)
ALP BLD-CCNC: 76 U/L (ref 40–129)
ALT SERPL-CCNC: 6 U/L (ref 0–40)
ANION GAP SERPL CALCULATED.3IONS-SCNC: 18 MMOL/L (ref 7–16)
AST SERPL-CCNC: 17 U/L (ref 0–39)
BASOPHILS ABSOLUTE: 0.07 E9/L (ref 0–0.2)
BASOPHILS RELATIVE PERCENT: 0.8 % (ref 0–2)
BILIRUB SERPL-MCNC: 0.6 MG/DL (ref 0–1.2)
BUN BLDV-MCNC: 22 MG/DL (ref 6–23)
C-REACTIVE PROTEIN: 2.8 MG/DL (ref 0–0.4)
CALCIUM SERPL-MCNC: 10.3 MG/DL (ref 8.6–10.2)
CHLORIDE BLD-SCNC: 102 MMOL/L (ref 98–107)
CHOLESTEROL, TOTAL: 213 MG/DL (ref 0–199)
CO2: 20 MMOL/L (ref 22–29)
CREAT SERPL-MCNC: 1.2 MG/DL (ref 0.7–1.2)
EOSINOPHILS ABSOLUTE: 0.25 E9/L (ref 0.05–0.5)
EOSINOPHILS RELATIVE PERCENT: 2.8 % (ref 0–6)
GFR AFRICAN AMERICAN: >60
GFR NON-AFRICAN AMERICAN: 57 ML/MIN/1.73
GLUCOSE BLD-MCNC: 114 MG/DL (ref 74–99)
HBA1C MFR BLD: 6.8 % (ref 4–5.6)
HCT VFR BLD CALC: 45.4 % (ref 37–54)
HDLC SERPL-MCNC: 57 MG/DL
HEMOGLOBIN: 14.7 G/DL (ref 12.5–16.5)
IMMATURE GRANULOCYTES #: 0.07 E9/L
IMMATURE GRANULOCYTES %: 0.8 % (ref 0–5)
LDL CHOLESTEROL CALCULATED: 143 MG/DL (ref 0–99)
LYMPHOCYTES ABSOLUTE: 1.14 E9/L (ref 1.5–4)
LYMPHOCYTES RELATIVE PERCENT: 12.5 % (ref 20–42)
MCH RBC QN AUTO: 32 PG (ref 26–35)
MCHC RBC AUTO-ENTMCNC: 32.4 % (ref 32–34.5)
MCV RBC AUTO: 98.9 FL (ref 80–99.9)
MONOCYTES ABSOLUTE: 0.93 E9/L (ref 0.1–0.95)
MONOCYTES RELATIVE PERCENT: 10.2 % (ref 2–12)
NEUTROPHILS ABSOLUTE: 6.63 E9/L (ref 1.8–7.3)
NEUTROPHILS RELATIVE PERCENT: 72.9 % (ref 43–80)
PDW BLD-RTO: 14 FL (ref 11.5–15)
PLATELET # BLD: 255 E9/L (ref 130–450)
PMV BLD AUTO: 11 FL (ref 7–12)
POTASSIUM SERPL-SCNC: 4.8 MMOL/L (ref 3.5–5)
PROSTATE SPECIFIC ANTIGEN: 1.78 NG/ML (ref 0–4)
RBC # BLD: 4.59 E12/L (ref 3.8–5.8)
RHEUMATOID FACTOR: 10 IU/ML (ref 0–13)
SEDIMENTATION RATE, ERYTHROCYTE: 28 MM/HR (ref 0–15)
SODIUM BLD-SCNC: 140 MMOL/L (ref 132–146)
TOTAL PROTEIN: 7.7 G/DL (ref 6.4–8.3)
TRIGL SERPL-MCNC: 66 MG/DL (ref 0–149)
TSH SERPL DL<=0.05 MIU/L-ACNC: 3.48 UIU/ML (ref 0.27–4.2)
VLDLC SERPL CALC-MCNC: 13 MG/DL
WBC # BLD: 9.1 E9/L (ref 4.5–11.5)

## 2021-07-09 NOTE — TELEPHONE ENCOUNTER
Daughter Shital Cruz called wanting to talk to you about her fathers condition.     Shital Cruz  [phone]    Last seen 7/8/2021  Next appt 11/2/2021

## 2021-07-12 NOTE — TELEPHONE ENCOUNTER
I called the phone # [phone] to discuss her father's condition and left a message for she to call me

## 2021-07-28 ENCOUNTER — OFFICE VISIT (OUTPATIENT)
Dept: FAMILY MEDICINE CLINIC | Age: 86
End: 2021-07-28
Payer: MEDICARE

## 2021-07-28 ENCOUNTER — NURSE TRIAGE (OUTPATIENT)
Dept: OTHER | Facility: CLINIC | Age: 86
End: 2021-07-28

## 2021-07-28 VITALS
DIASTOLIC BLOOD PRESSURE: 70 MMHG | OXYGEN SATURATION: 97 % | WEIGHT: 177 LBS | TEMPERATURE: 97 F | SYSTOLIC BLOOD PRESSURE: 150 MMHG | BODY MASS INDEX: 28.57 KG/M2 | HEART RATE: 63 BPM

## 2021-07-28 DIAGNOSIS — E78.5 HYPERLIPIDEMIA, UNSPECIFIED HYPERLIPIDEMIA TYPE: ICD-10-CM

## 2021-07-28 DIAGNOSIS — I50.9 CONGESTIVE HEART FAILURE, UNSPECIFIED HF CHRONICITY, UNSPECIFIED HEART FAILURE TYPE (HCC): Primary | ICD-10-CM

## 2021-07-28 DIAGNOSIS — I48.0 PAROXYSMAL A-FIB (HCC): ICD-10-CM

## 2021-07-28 DIAGNOSIS — R60.0 EDEMA OF BOTH LEGS: ICD-10-CM

## 2021-07-28 DIAGNOSIS — Z95.0 PACEMAKER: ICD-10-CM

## 2021-07-28 LAB
INTERNATIONAL NORMALIZATION RATIO, POC: 1.7
PROTHROMBIN TIME, POC: NORMAL

## 2021-07-28 PROCEDURE — 85610 PROTHROMBIN TIME: CPT | Performed by: INTERNAL MEDICINE

## 2021-07-28 PROCEDURE — 99213 OFFICE O/P EST LOW 20 MIN: CPT | Performed by: INTERNAL MEDICINE

## 2021-07-28 RX ORDER — FUROSEMIDE 20 MG/1
20 TABLET ORAL DAILY
Qty: 60 TABLET | Refills: 3 | Status: SHIPPED
Start: 2021-07-28 | End: 2021-08-23 | Stop reason: SDUPTHER

## 2021-07-28 RX ORDER — PRAVASTATIN SODIUM 10 MG
10 TABLET ORAL DAILY
Qty: 30 TABLET | Refills: 5 | Status: SHIPPED | OUTPATIENT
Start: 2021-07-28 | End: 2022-02-22

## 2021-07-28 NOTE — PROGRESS NOTES
Subjective:     Chief Complaint   Patient presents with    Leg Swelling     right   complains of swelling both legs  finally he is taking Coumadin as prescribed  is taking 6 mg INR 1.7    he has history atrial fibrillation has been on Coumadin    I had spoken to his daughters that patient was noncompliant and forgetting to take meds, he tells me they called him every day morning and evening and remind him to take his  medication    Past Medical History:   Diagnosis Date    A-fib [facility])     BPH (benign prostatic hyperplasia)     Diabetes mellitus ([facility]     Hyperlipidemia     Hypertension     Osteoarthritis     Renal cyst     Type 2 diabetes mellitus without complication ([facility]     Unspecified cerebral artery occlusion with cerebral infarction 10/2010    mild stroke        Social History     Socioeconomic History    Marital status:      Spouse name: Not on file    Number of children: Not on file    Years of education: Not on file    Highest education level: Not on file   Occupational History    Not on file   Tobacco Use    Smoking status: Former Smoker     Packs/day: 1.00     Years: 30.00     Pack years: 30.00     Quit date: 3/17/1998     Years since quittin.3    Smokeless tobacco: Former User     Quit date: 2015   Substance and Sexual Activity    Alcohol use: Yes     Comment: 2 beers daily    Drug use: No    Sexual activity: Not on file   Other Topics Concern    Not on file   Social History Narrative    Not on file     Social Determinants of Health     Financial Resource Strain:     Difficulty of Paying Living Expenses:    Food Insecurity: No Food Insecurity    Worried About Running Out of Food in the Last Year: Never true    Shayy of Food in the Last Year: Never true   Transportation Needs: No Transportation Needs    Lack of Transportation (Medical): No    Lack of Transportation (Non-Medical):  No   Physical Activity:     Days of Exercise per Week:     Minutes of Exercise per Session:    Stress:     Feeling of Stress :    Social Connections:     Frequency of Communication with Friends and Family:     Frequency of Social Gatherings with Friends and Family:     Attends Jain Services:     Active Member of Clubs or Organizations:     Attends Club or Organization Meetings:     Marital Status:    Intimate Partner Violence:     Fear of Current or Ex-Partner:     Emotionally Abused:     Physically Abused:     Sexually Abused:         Past Surgical History:   Procedure Laterality Date    CATARACT REMOVAL WITH IMPLANT Right 11 12 2013    CATARACT REMOVAL WITH IMPLANT Left 03/11/13    EYE SURGERY Right 11/15/13    dislocated intraocular lens    JOINT REPLACEMENT      left hip        Family History   Problem Relation Age of Onset    Asthma Mother     High Blood Pressure Mother     Stroke Mother         Allergies   Allergen Reactions    Lipitor [Atorvastatin Calcium] Other (See Comments)     Muscle cramps    Vytorin [Ezetimibe-Simvastatin]      Leg Cramps          ROS  No acute distress  Cardiac: Denies any chest pain or palpitation  atrial fibrillation, has refused cardiac catheterization, pacemaker 2017, has another appointment to see him, saw him in 2019  Respiratory: Denies any cough or shortness of breath  GI: No abdominal pain. Denies any nausea vomiting or diarrhea  declined colonoscopy many times  : Denies any dysuria frequency or hematuria  Neuro: No headache or dizziness  Endocrine: No diabetes  Skin: normal  No recent weight gain or weight loss  Denies any change in vision    Objective:    BP (!) 150/70   Pulse 63   Temp 97 °F (36.1 °C)   Wt 177 lb (80.3 kg)   SpO2 97%   BMI 28.57 kg/m²     Constitutional: Alert awake and oriented  Eyes: Pupils equal bilaterally.  Extraocular muscles intact  Neck: no JVD adenopathy no bruit  Heart: Irregularly irregular, 1 or 6 systolic murmur, no S3  Lungs:    Decreased breath sounds right base  Abd: bowel sounds present, nontender, nondistended, no masses  Extrem:  No clubbing, cyanosis,     2+ edema right leg, 1+ on the left leg  Neuro: AAOx3,No Focal deficit  Psychological: no depression or anxiety         Carotid ultrasound March 2017 less than 50%  Walks with a cane due to arthritis        Current Outpatient Medications   Medication Sig Dispense Refill    furosemide (LASIX) 20 MG tablet Take 1 tablet by mouth daily 60 tablet 3    pravastatin (PRAVACHOL) 10 MG tablet Take 1 tablet by mouth daily 30 tablet 5    ramipril (ALTACE) 2.5 MG capsule TAKE ONE CAPSULE BY MOUTH EVERY DAY      warfarin (COUMADIN) 5 MG tablet Take 1 tablet by mouth daily 90 tablet 1    warfarin (COUMADIN) 2 MG tablet Take 0.5 tablets by mouth daily 45 tablet 1    brimonidine-timolol (COMBIGAN) 0.2-0.5 % ophthalmic solution Place 1 drop into the right eye every 12 hours. 1 Bottle 1     No current facility-administered medications for this visit.         Last 3 BMP  Lab Results   Component Value Date/Time     07/09/2021 09:54 AM     09/16/2020 03:20 PM     04/07/2018 10:40 PM    K 4.8 07/09/2021 09:54 AM    K 4.3 09/16/2020 03:20 PM    K 4.4 03/23/2020 12:00 AM    K 4.4 03/23/2020 12:00 AM    K 4.1 09/09/2019 12:00 AM    K 4.4 04/07/2018 10:40 PM     07/09/2021 09:54 AM     09/16/2020 03:20 PM     04/07/2018 10:40 PM    CO2 20 (L) 07/09/2021 09:54 AM    CO2 20 (L) 09/16/2020 03:20 PM    CO2 24 04/07/2018 10:40 PM    BUN 22 07/09/2021 09:54 AM    BUN 17 09/16/2020 03:20 PM    BUN 18 04/07/2018 10:40 PM    CREATININE 1.2 07/09/2021 09:54 AM    CREATININE 1.1 09/16/2020 03:20 PM    CREATININE 1.2 03/23/2020 12:00 AM    CREATININE 1.2 03/23/2020 12:00 AM    CREATININE 1.0 09/09/2019 12:00 AM    GLUCOSE 114 (H) 07/09/2021 09:54 AM    GLUCOSE 150 (H) 09/16/2020 03:20 PM    GLUCOSE 101 04/07/2018 10:40 PM    GLUCOSE 137 (H) 03/05/2012 08:54 AM    GLUCOSE 87 03/18/2011 07:05 AM    GLUCOSE 117 (H) 12/29/2010 08:37 AM CALCIUM 10.3 (H) 07/09/2021 09:54 AM    CALCIUM 10.0 09/16/2020 03:20 PM    CALCIUM 9.2 04/07/2018 10:40 PM       Last 3 CMP:    Lab Results   Component Value Date/Time     07/09/2021 09:54 AM     09/16/2020 03:20 PM     04/07/2018 10:40 PM    K 4.8 07/09/2021 09:54 AM    K 4.3 09/16/2020 03:20 PM    K 4.4 03/23/2020 12:00 AM    K 4.4 03/23/2020 12:00 AM    K 4.1 09/09/2019 12:00 AM    K 4.4 04/07/2018 10:40 PM     07/09/2021 09:54 AM     09/16/2020 03:20 PM     04/07/2018 10:40 PM    CO2 20 (L) 07/09/2021 09:54 AM    CO2 20 (L) 09/16/2020 03:20 PM    CO2 24 04/07/2018 10:40 PM    BUN 22 07/09/2021 09:54 AM    BUN 17 09/16/2020 03:20 PM    BUN 18 04/07/2018 10:40 PM    CREATININE 1.2 07/09/2021 09:54 AM    CREATININE 1.1 09/16/2020 03:20 PM    CREATININE 1.2 03/23/2020 12:00 AM    CREATININE 1.2 03/23/2020 12:00 AM    CREATININE 1.0 09/09/2019 12:00 AM    GLUCOSE 114 (H) 07/09/2021 09:54 AM    GLUCOSE 150 (H) 09/16/2020 03:20 PM    GLUCOSE 101 04/07/2018 10:40 PM    GLUCOSE 137 (H) 03/05/2012 08:54 AM    GLUCOSE 87 03/18/2011 07:05 AM    GLUCOSE 117 (H) 12/29/2010 08:37 AM    CALCIUM 10.3 (H) 07/09/2021 09:54 AM    CALCIUM 10.0 09/16/2020 03:20 PM    CALCIUM 9.2 04/07/2018 10:40 PM    PROT 7.7 07/09/2021 09:54 AM    PROT 6.7 09/16/2020 03:20 PM    PROT 6.9 04/07/2018 10:40 PM    LABALBU 4.2 07/09/2021 09:54 AM    LABALBU 3.8 09/16/2020 03:20 PM    LABALBU 3.7 04/07/2018 10:40 PM    LABALBU 4.1 03/05/2012 08:54 AM    LABALBU 4.1 12/29/2010 08:37 AM    BILITOT 0.6 07/09/2021 09:54 AM    BILITOT 0.5 09/16/2020 03:20 PM    BILITOT 0.5 04/07/2018 10:40 PM    ALKPHOS 76 07/09/2021 09:54 AM    ALKPHOS 60 09/16/2020 03:20 PM    ALKPHOS 67 04/07/2018 10:40 PM    AST 17 07/09/2021 09:54 AM    AST 23 09/16/2020 03:20 PM    AST 20 04/07/2018 10:40 PM    ALT 6 07/09/2021 09:54 AM    ALT 9 09/16/2020 03:20 PM    ALT 9 04/07/2018 10:40 PM        CBC:   Lab Results   Component Value Date/Time WBC 9.1 07/09/2021 09:54 AM    RBC 4.59 07/09/2021 09:54 AM    HGB 14.7 07/09/2021 09:54 AM    HCT 45.4 07/09/2021 09:54 AM    MCV 98.9 07/09/2021 09:54 AM    MCH 32.0 07/09/2021 09:54 AM    MCHC 32.4 07/09/2021 09:54 AM    RDW 14.0 07/09/2021 09:54 AM     07/09/2021 09:54 AM    MPV 11.0 07/09/2021 09:54 AM       A1C:  Lab Results   Component Value Date/Time    LABA1C 6.8 (H) 07/09/2021 09:54 AM       Lipid panel:  Lab Results   Component Value Date    CHOL 213 07/09/2021    CHOL 190 03/23/2020    CHOL 190 03/23/2020    TRIG 66 07/09/2021    TRIG 71 03/23/2020    TRIG 71 03/23/2020    HDL 57 07/09/2021    HDL 56 03/23/2020    HDL 56 03/23/2020        Lab Results   Component Value Date/Time    PROT 7.7 07/09/2021 09:54 AM    PROT 6.7 09/16/2020 03:20 PM    PROT 6.9 04/07/2018 10:40 PM    INR 1.7 07/28/2021 02:41 PM    INR 1.1 07/08/2021 04:10 PM    INR 1.2 03/09/2021 02:37 PM    INR 1.1 04/07/2018 10:40 PM    INR 1.7 09/12/2017 02:45 PM    INR 1.5 03/29/2017 07:34 AM       Lab Results   Component Value Date/Time    MG 1.7 09/12/2017 02:45 PM         Assessment. Aviva Juan was seen today for leg swelling. Diagnoses and all orders for this visit:    Congestive heart failure, unspecified HF chronicity, unspecified heart failure type ([facility]  -     XR CHEST (2 VW); Future    Paroxysmal A-fib (HCC)  -     POCT INR    Edema of both legs    Hyperlipidemia, unspecified hyperlipidemia type    Pacemaker    Other orders  -     furosemide (LASIX) 20 MG tablet; Take 1 tablet by mouth daily  -     pravastatin (PRAVACHOL) 10 MG tablet;  Take 1 tablet by mouth daily       Patient Active Problem List   Diagnosis    Right cataract    Dislocated IOL (intraocular lens), posterior    Cataract of left eye    Essential hypertension    Inflammatory arthritis    Paroxysmal A-fib ([address]    Hyperlipidemia    Hyperglycemia    Pacemaker       Plan: Edema both legs rule out CHF  Lasix 20 mg daily monitor BUN/creatinine potassium    x-ray chest PA and lateral    Atrial fibrillation rate controlled INR 1.7 finally improved take 7 mg today then 6 mg every day and 7 mg on Sunday    I had spoken to the daughter, I spoke to the daughter in July 08, 2021  see detailed progress note July 08, 2021    see the cardiologist have a pacemaker checked    Return in about 2 weeks (around 8/11/2021).        Elnita Osler, MD  3:37 PM  7/28/2021     [place]

## 2021-08-02 ENCOUNTER — TELEPHONE (OUTPATIENT)
Dept: FAMILY MEDICINE CLINIC | Age: 86
End: 2021-08-02

## 2021-08-02 NOTE — TELEPHONE ENCOUNTER
----- Message from Brady Sanabria sent at 7/31/2021  1:01 PM EDT -----  Subject: Appointment Request    Reason for Call: Routine Existing Condition Follow Up    QUESTIONS  Type of Appointment? Established Patient  Reason for appointment request? Available appointments did not meet   patient need  Additional Information for Provider? Patient needs a 2 week follow up. He   states  forgot to schedule him.  ---------------------------------------------------------------------------  --------------  Keren Grass INFO  What is the best way for the office to contact you? OK to leave message on   voicemail  Preferred Call Back Phone Number? [phone]  ---------------------------------------------------------------------------  --------------  SCRIPT ANSWERS  Relationship to Patient? Self  (Is the patient requesting to be seen urgently for their symptoms?)? No  Is this follow up request related to routine Diabetes Management? No  Are you having any new concerns about your existing condition? No  Have you been diagnosed with, awaiting test results for, or told that you   are suspected of having COVID-19 (Coronavirus)? (If patient has tested   negative or was tested as a requirement for work, school, or travel and   not based on symptoms, answer no)? No  Do you currently have flu-like symptoms including fever or chills, cough,   shortness of breath, difficulty breathing, or new loss of taste or smell? No  Have you had close contact with someone with COVID-19 in the last 14 days? No  (Service Expert - click yes below to proceed with [employer] As Usual   Scheduling)?  Yes

## 2021-08-03 ENCOUNTER — TELEPHONE (OUTPATIENT)
Dept: FAMILY MEDICINE CLINIC | Age: 86
End: 2021-08-03

## 2021-08-03 NOTE — TELEPHONE ENCOUNTER
----- Message from Jeronimo Huerta sent at 8/3/2021  1:55 PM EDT -----  Subject: Message to Provider    QUESTIONS  Information for Provider? patient daughter called because she has missed   calls from  about some concerned she has about her dad's   health and she wanted to inform  that she can be today before   5:15, tomorrow or Thursday.   ---------------------------------------------------------------------------  --------------  CALL BACK INFO  What is the best way for the office to contact you? OK to leave message on   voicemail  Preferred Call Back Phone Number? [phone]  ---------------------------------------------------------------------------  --------------  SCRIPT ANSWERS  Relationship to Patient? Other  Representative Name? Brenda Hall  Is the Representative on the appropriate HIPAA document in Epic?  Yes

## 2021-08-09 ENCOUNTER — OFFICE VISIT (OUTPATIENT)
Dept: FAMILY MEDICINE CLINIC | Age: 86
End: 2021-08-09
Payer: MEDICARE

## 2021-08-09 VITALS
TEMPERATURE: 97.3 F | HEART RATE: 65 BPM | WEIGHT: 174 LBS | BODY MASS INDEX: 28.08 KG/M2 | OXYGEN SATURATION: 96 % | DIASTOLIC BLOOD PRESSURE: 74 MMHG | SYSTOLIC BLOOD PRESSURE: 120 MMHG

## 2021-08-09 DIAGNOSIS — I48.0 PAROXYSMAL A-FIB (HCC): ICD-10-CM

## 2021-08-09 DIAGNOSIS — I50.9 CHRONIC CONGESTIVE HEART FAILURE, UNSPECIFIED HEART FAILURE TYPE (HCC): Primary | ICD-10-CM

## 2021-08-09 DIAGNOSIS — R73.9 HYPERGLYCEMIA: ICD-10-CM

## 2021-08-09 DIAGNOSIS — I10 ESSENTIAL HYPERTENSION: ICD-10-CM

## 2021-08-09 LAB
INTERNATIONAL NORMALIZATION RATIO, POC: 1.4
PROTHROMBIN TIME, POC: NORMAL

## 2021-08-09 PROCEDURE — 85610 PROTHROMBIN TIME: CPT | Performed by: INTERNAL MEDICINE

## 2021-08-09 PROCEDURE — 99213 OFFICE O/P EST LOW 20 MIN: CPT | Performed by: INTERNAL MEDICINE

## 2021-08-09 NOTE — PROGRESS NOTES
Subjective:     Chief Complaint   Patient presents with    Leg Swelling    Foot Swelling   Patient is here complaining of swelling both legs right more than left he was seen a week ago he was given Lasix which has helped somewhat better  He has lost 2 pounds of weight  He denies any orthopnea or dyspnea  He does have chronic A. fib he does follow with Dr. Rosa Ortiz    He had his pacemaker checked    Labs reviewed    Chest x-ray negative    Past Medical History:   Diagnosis Date    A-fib [facility])     BPH (benign prostatic hyperplasia)     Diabetes mellitus ([facility]     Hyperlipidemia     Hypertension     Osteoarthritis     Renal cyst     Type 2 diabetes mellitus without complication ([facility]     Unspecified cerebral artery occlusion with cerebral infarction 10/2010    mild stroke        Social History     Socioeconomic History    Marital status:      Spouse name: Not on file    Number of children: Not on file    Years of education: Not on file    Highest education level: Not on file   Occupational History    Not on file   Tobacco Use    Smoking status: Former Smoker     Packs/day: 1.00     Years: 30.00     Pack years: 30.00     Quit date: 3/17/1998     Years since quittin.4    Smokeless tobacco: Former User     Quit date: 2015   Substance and Sexual Activity    Alcohol use: Yes     Comment: 2 beers daily    Drug use: No    Sexual activity: Not on file   Other Topics Concern    Not on file   Social History Narrative    Not on file     Social Determinants of Health     Financial Resource Strain:     Difficulty of Paying Living Expenses:    Food Insecurity: No Food Insecurity    Worried About Running Out of Food in the Last Year: Never true    Shayy of Food in the Last Year: Never true   Transportation Needs: No Transportation Needs    Lack of Transportation (Medical): No    Lack of Transportation (Non-Medical):  No   Physical Activity:     Days of Exercise per Week:     Minutes of Exercise per Session:    Stress:     Feeling of Stress :    Social Connections:     Frequency of Communication with Friends and Family:     Frequency of Social Gatherings with Friends and Family:     Attends [employer] Services:     Active Member of Clubs or Organizations:     Attends Club or Organization Meetings:     Marital Status:    Intimate Partner Violence:     Fear of Current or Ex-Partner:     Emotionally Abused:     Physically Abused:     Sexually Abused:         Past Surgical History:   Procedure Laterality Date    CATARACT REMOVAL WITH IMPLANT Right 11 12 2013    CATARACT REMOVAL WITH IMPLANT Left 03/11/13    EYE SURGERY Right 11/15/13    dislocated intraocular lens    JOINT REPLACEMENT      left hip        Family History   Problem Relation Age of Onset    Asthma Mother     High Blood Pressure Mother     Stroke Mother         Allergies   Allergen Reactions    Lipitor [Atorvastatin Calcium] Other (See Comments)     Muscle cramps    Vytorin [Ezetimibe-Simvastatin]      Leg Cramps          ROS  No acute distress  Cardiac: Denies any chest pain or palpitation history of atrial fibrillation pacemaker follows with Dr. Scarlett Rosuseau  Seen by cardiology had refused cardiac catheterization  Respiratory: Denies any cough or shortness of breath  GI: No abdominal pain. Denies any nausea vomiting or diarrhea  Declines colonoscopy has declined in the past  : Denies any dysuria frequency or hematuria  Neuro: No headache or dizziness  Endocrine: No diabetes  Skin: normal  No recent weight gain or weight loss  Denies any change in vision    Objective:    /74   Pulse 65   Temp 97.3 °F (36.3 °C)   Wt 174 lb (78.9 kg)   SpO2 96%   BMI 28.08 kg/m²     Constitutional: Alert awake and oriented  Eyes: Pupils equal bilaterally.  Extraocular muscles intact  Neck: no JVD adenopathy no bruit  Heart: Irregularly irregular, 1 / 6 systolic murmur left parasternal base  Lungs:    no wheeze, rales or rhonchi  Abd: bowel sounds present, nontender, nondistended, no masses  Extrem:  No clubbing, cyanosis, or edema  Edema on the right leg 1-2+ improved left leg 1+ improving  Neuro: AAOx3,No Focal deficit  Carotid ultrasound March 2017 less than 50 percent  Psychological: no depression or anxiety       Current Outpatient Medications   Medication Sig Dispense Refill    furosemide (LASIX) 20 MG tablet Take 1 tablet by mouth daily 60 tablet 3    pravastatin (PRAVACHOL) 10 MG tablet Take 1 tablet by mouth daily 30 tablet 5    ramipril (ALTACE) 2.5 MG capsule TAKE ONE CAPSULE BY MOUTH EVERY DAY      warfarin (COUMADIN) 5 MG tablet Take 1 tablet by mouth daily 90 tablet 1    warfarin (COUMADIN) 2 MG tablet Take 0.5 tablets by mouth daily 45 tablet 1    brimonidine-timolol (COMBIGAN) 0.2-0.5 % ophthalmic solution Place 1 drop into the right eye every 12 hours. 1 Bottle 1     No current facility-administered medications for this visit.         Last 3 BMP  Lab Results   Component Value Date/Time     07/09/2021 09:54 AM     09/16/2020 03:20 PM     04/07/2018 10:40 PM    K 4.8 07/09/2021 09:54 AM    K 4.3 09/16/2020 03:20 PM    K 4.4 03/23/2020 12:00 AM    K 4.4 03/23/2020 12:00 AM    K 4.1 09/09/2019 12:00 AM    K 4.4 04/07/2018 10:40 PM     07/09/2021 09:54 AM     09/16/2020 03:20 PM     04/07/2018 10:40 PM    CO2 20 (L) 07/09/2021 09:54 AM    CO2 20 (L) 09/16/2020 03:20 PM    CO2 24 04/07/2018 10:40 PM    BUN 22 07/09/2021 09:54 AM    BUN 17 09/16/2020 03:20 PM    BUN 18 04/07/2018 10:40 PM    CREATININE 1.2 07/09/2021 09:54 AM    CREATININE 1.1 09/16/2020 03:20 PM    CREATININE 1.2 03/23/2020 12:00 AM    CREATININE 1.2 03/23/2020 12:00 AM    CREATININE 1.0 09/09/2019 12:00 AM    GLUCOSE 114 (H) 07/09/2021 09:54 AM    GLUCOSE 150 (H) 09/16/2020 03:20 PM    GLUCOSE 101 04/07/2018 10:40 PM    GLUCOSE 137 (H) 03/05/2012 08:54 AM    GLUCOSE 87 03/18/2011 07:05 AM    GLUCOSE 117 (H) 12/29/2010 08:37 AM    CALCIUM 10.3 (H) 07/09/2021 09:54 AM    CALCIUM 10.0 09/16/2020 03:20 PM    CALCIUM 9.2 04/07/2018 10:40 PM       Last 3 CMP:    Lab Results   Component Value Date/Time     07/09/2021 09:54 AM     09/16/2020 03:20 PM     04/07/2018 10:40 PM    K 4.8 07/09/2021 09:54 AM    K 4.3 09/16/2020 03:20 PM    K 4.4 03/23/2020 12:00 AM    K 4.4 03/23/2020 12:00 AM    K 4.1 09/09/2019 12:00 AM    K 4.4 04/07/2018 10:40 PM     07/09/2021 09:54 AM     09/16/2020 03:20 PM     04/07/2018 10:40 PM    CO2 20 (L) 07/09/2021 09:54 AM    CO2 20 (L) 09/16/2020 03:20 PM    CO2 24 04/07/2018 10:40 PM    BUN 22 07/09/2021 09:54 AM    BUN 17 09/16/2020 03:20 PM    BUN 18 04/07/2018 10:40 PM    CREATININE 1.2 07/09/2021 09:54 AM    CREATININE 1.1 09/16/2020 03:20 PM    CREATININE 1.2 03/23/2020 12:00 AM    CREATININE 1.2 03/23/2020 12:00 AM    CREATININE 1.0 09/09/2019 12:00 AM    GLUCOSE 114 (H) 07/09/2021 09:54 AM    GLUCOSE 150 (H) 09/16/2020 03:20 PM    GLUCOSE 101 04/07/2018 10:40 PM    GLUCOSE 137 (H) 03/05/2012 08:54 AM    GLUCOSE 87 03/18/2011 07:05 AM    GLUCOSE 117 (H) 12/29/2010 08:37 AM    CALCIUM 10.3 (H) 07/09/2021 09:54 AM    CALCIUM 10.0 09/16/2020 03:20 PM    CALCIUM 9.2 04/07/2018 10:40 PM    PROT 7.7 07/09/2021 09:54 AM    PROT 6.7 09/16/2020 03:20 PM    PROT 6.9 04/07/2018 10:40 PM    LABALBU 4.2 07/09/2021 09:54 AM    LABALBU 3.8 09/16/2020 03:20 PM    LABALBU 3.7 04/07/2018 10:40 PM    LABALBU 4.1 03/05/2012 08:54 AM    LABALBU 4.1 12/29/2010 08:37 AM    BILITOT 0.6 07/09/2021 09:54 AM    BILITOT 0.5 09/16/2020 03:20 PM    BILITOT 0.5 04/07/2018 10:40 PM    ALKPHOS 76 07/09/2021 09:54 AM    ALKPHOS 60 09/16/2020 03:20 PM    ALKPHOS 67 04/07/2018 10:40 PM    AST 17 07/09/2021 09:54 AM    AST 23 09/16/2020 03:20 PM    AST 20 04/07/2018 10:40 PM    ALT 6 07/09/2021 09:54 AM    ALT 9 09/16/2020 03:20 PM    ALT 9 04/07/2018 10:40 PM        CBC:   Lab Results Component Value Date/Time    WBC 9.1 07/09/2021 09:54 AM    RBC 4.59 07/09/2021 09:54 AM    HGB 14.7 07/09/2021 09:54 AM    HCT 45.4 07/09/2021 09:54 AM    MCV 98.9 07/09/2021 09:54 AM    MCH 32.0 07/09/2021 09:54 AM    MCHC 32.4 07/09/2021 09:54 AM    RDW 14.0 07/09/2021 09:54 AM     07/09/2021 09:54 AM    MPV 11.0 07/09/2021 09:54 AM       A1C:  Lab Results   Component Value Date/Time    LABA1C 6.8 (H) 07/09/2021 09:54 AM       Lipid panel:  Lab Results   Component Value Date    CHOL 213 07/09/2021    CHOL 190 03/23/2020    CHOL 190 03/23/2020    TRIG 66 07/09/2021    TRIG 71 03/23/2020    TRIG 71 03/23/2020    HDL 57 07/09/2021    HDL 56 03/23/2020    HDL 56 03/23/2020        Lab Results   Component Value Date/Time    PROT 7.7 07/09/2021 09:54 AM    PROT 6.7 09/16/2020 03:20 PM    PROT 6.9 04/07/2018 10:40 PM    INR 1.4 08/09/2021 10:02 AM    INR 1.7 07/28/2021 02:41 PM    INR 1.1 07/08/2021 04:10 PM    INR 1.1 04/07/2018 10:40 PM    INR 1.7 09/12/2017 02:45 PM    INR 1.5 03/29/2017 07:34 AM       Lab Results   Component Value Date/Time    MG 1.7 09/12/2017 02:45 PM         Assessment. Deion Connor was seen today for leg swelling and foot swelling. Diagnoses and all orders for this visit:    Chronic congestive heart failure, unspecified heart failure type [facility])  -     External Referral To Cardiology    Paroxysmal A-fib ([facility]  -     POCT INR  -     US DUP LOWER EXTREMITY RIGHT ANTWON;  Future  -     External Referral To Cardiology    Essential hypertension    Hyperglycemia       Patient Active Problem List   Diagnosis    Right cataract    Dislocated IOL (intraocular lens), posterior    Cataract of left eye    Essential hypertension    Inflammatory arthritis    Paroxysmal A-fib ([address]    Hyperlipidemia    Hyperglycemia    Pacemaker       Plan: Edema legs combination of diastolic CHF and peripheral edema with venous insufficiency, advised to use elastic stocking he says he cannot use it,  He agreed to use Ace wrap bandage  Continue Lasix 20 mg daily helping  BUN/creatinine potassium okay  Chest x-ray negative  Refer to Dr. Brenda Orozco rule out cardiac etiology      Paroxysmal A. fib INR 1.4 it was 1.7 last time Coumadin was increased  Now increase the Coumadin to 7 mg  Patient declining Eliquis    I have left a message for the daughter twice to discuss with her    Diabetes type 2 A1c 6.5  He was on Metformin he stopped it we will discuss with the daughter      Ultrasound right leg rule out DVT clinically doubt    Follow back in 2 weeks    Return in about 2 weeks (around 8/23/2021).        Gayatri Swift MD  10:19 AM  8/9/2021     [place]

## 2021-08-23 ENCOUNTER — OFFICE VISIT (OUTPATIENT)
Dept: FAMILY MEDICINE CLINIC | Age: 86
End: 2021-08-23
Payer: MEDICARE

## 2021-08-23 VITALS
OXYGEN SATURATION: 100 % | DIASTOLIC BLOOD PRESSURE: 70 MMHG | HEART RATE: 60 BPM | BODY MASS INDEX: 28.25 KG/M2 | SYSTOLIC BLOOD PRESSURE: 138 MMHG | TEMPERATURE: 97.3 F | WEIGHT: 175 LBS

## 2021-08-23 DIAGNOSIS — E10.9 CONTROLLED DIABETES MELLITUS TYPE 1 WITHOUT COMPLICATIONS (HCC): ICD-10-CM

## 2021-08-23 DIAGNOSIS — I50.32 CHRONIC DIASTOLIC CONGESTIVE HEART FAILURE (HCC): Primary | ICD-10-CM

## 2021-08-23 DIAGNOSIS — I48.0 PAROXYSMAL A-FIB (HCC): ICD-10-CM

## 2021-08-23 DIAGNOSIS — I10 ESSENTIAL HYPERTENSION: ICD-10-CM

## 2021-08-23 LAB
INTERNATIONAL NORMALIZATION RATIO, POC: 1.1
PROTHROMBIN TIME, POC: NORMAL

## 2021-08-23 PROCEDURE — 85610 PROTHROMBIN TIME: CPT | Performed by: INTERNAL MEDICINE

## 2021-08-23 PROCEDURE — 99213 OFFICE O/P EST LOW 20 MIN: CPT | Performed by: INTERNAL MEDICINE

## 2021-08-23 RX ORDER — RAMIPRIL 2.5 MG/1
2.5 CAPSULE ORAL DAILY
Qty: 90 CAPSULE | Refills: 1 | Status: SHIPPED | OUTPATIENT
Start: 2021-08-23 | End: 2022-02-23 | Stop reason: SDUPTHER

## 2021-08-23 RX ORDER — FUROSEMIDE 20 MG/1
20 TABLET ORAL DAILY
Qty: 30 TABLET | Refills: 1 | Status: SHIPPED | OUTPATIENT
Start: 2021-08-23 | End: 2022-02-23 | Stop reason: SDUPTHER

## 2021-08-23 NOTE — PROGRESS NOTES
Subjective:     Chief Complaint   Patient presents with    1 Month Follow-Up   Patient here for follow-up on the swelling of the right leg doing much better    He can put on her shoes  He is using the elastic stockings  He was pleased about that  No recent fall no injury    He was sent to cardiologist Dr. Stefani Adrian patient tells me he saw him few days ago  He was told everything is okay  Cardiologist report is pending    Past Medical History:   Diagnosis Date    A-fib [facility])     BPH (benign prostatic hyperplasia)     Diabetes mellitus ([facility]     Hyperlipidemia     Hypertension     Osteoarthritis     Renal cyst     Type 2 diabetes mellitus without complication ([facility]     Unspecified cerebral artery occlusion with cerebral infarction 10/2010    mild stroke        Social History     Socioeconomic History    Marital status:      Spouse name: Not on file    Number of children: Not on file    Years of education: Not on file    Highest education level: Not on file   Occupational History    Not on file   Tobacco Use    Smoking status: Former Smoker     Packs/day: 1.00     Years: 30.00     Pack years: 30.00     Quit date: 3/17/1998     Years since quittin.4    Smokeless tobacco: Former User     Quit date: 2015   Substance and Sexual Activity    Alcohol use: Yes     Comment: 2 beers daily    Drug use: No    Sexual activity: Not on file   Other Topics Concern    Not on file   Social History Narrative    Not on file     Social Determinants of Health     Financial Resource Strain:     Difficulty of Paying Living Expenses:    Food Insecurity: No Food Insecurity    Worried About Running Out of Food in the Last Year: Never true    Shayy of Food in the Last Year: Never true   Transportation Needs: No Transportation Needs    Lack of Transportation (Medical): No    Lack of Transportation (Non-Medical):  No   Physical Activity:     Days of Exercise per Week:     Minutes of Exercise per Session: Stress:     Feeling of Stress :    Social Connections:     Frequency of Communication with Friends and Family:     Frequency of Social Gatherings with Friends and Family:     Attends Anab[employer] Services:     Active Member of Clubs or Organizations:     Attends Club or Organization Meetings:     Marital Status:    Intimate Partner Violence:     Fear of Current or Ex-Partner:     Emotionally Abused:     Physically Abused:     Sexually Abused:         Past Surgical History:   Procedure Laterality Date    CATARACT REMOVAL WITH IMPLANT Right 11 12 2013    CATARACT REMOVAL WITH IMPLANT Left 03/11/13    EYE SURGERY Right 11/15/13    dislocated intraocular lens    JOINT REPLACEMENT      left hip        Family History   Problem Relation Age of Onset    Asthma Mother     High Blood Pressure Mother     Stroke Mother         Allergies   Allergen Reactions    Lipitor [Atorvastatin Calcium] Other (See Comments)     Muscle cramps    Vytorin [Ezetimibe-Simvastatin]      Leg Cramps          ROS  No acute distress  Cardiac: Denies any chest pain or palpitation  He saw Dr. Neida De Leon last week as per patient I do not have his letter yet  Patient tells me he told him everything okay  From his office patient had to go to nursing home to see his son    Has a pacemaker  Has refused cardiac catheterization as per cardiology  Respiratory: Denies any cough or shortness of breath  GI: No abdominal pain. Denies any nausea vomiting or diarrhea  Declining colonoscopy  Declined colonoscopy in the past  : Denies any dysuria frequency or hematuria  Neuro: No headache or dizziness  Endocrine: hx diabetes  Skin: normal  No recent weight gain or weight loss  Denies any change in vision    Objective:    /70   Pulse 60   Temp 97.3 °F (36.3 °C)   Wt 175 lb (79.4 kg)   SpO2 100%   BMI 28.25 kg/m²     Constitutional: Alert awake and oriented  Eyes: Pupils equal bilaterally.  Extraocular muscles intact  Neck: no JVD adenopathy no bruit  Heart:  RRR, no murmurs, gallops, or rubs. Lungs:    no wheeze, rales or rhonchi  Abd: bowel sounds present, nontender, nondistended, no masses  Extrem:  No clubbing, cyanosis,   Decrease edema right leg    Neuro: AAOx3,No Focal deficit  Psychological: no depression or anxiety       Current Outpatient Medications   Medication Sig Dispense Refill    apixaban (ELIQUIS) 5 MG TABS tablet Take 1 tablet by mouth 2 times daily 180 tablet 1    ramipril (ALTACE) 2.5 MG capsule Take 1 capsule by mouth daily 90 capsule 1    furosemide (LASIX) 20 MG tablet Take 1 tablet by mouth daily 30 tablet 1    pravastatin (PRAVACHOL) 10 MG tablet Take 1 tablet by mouth daily 30 tablet 5    brimonidine-timolol (COMBIGAN) 0.2-0.5 % ophthalmic solution Place 1 drop into the right eye every 12 hours. 1 Bottle 1     No current facility-administered medications for this visit.         Last 3 BMP  Lab Results   Component Value Date/Time     07/09/2021 09:54 AM     09/16/2020 03:20 PM     04/07/2018 10:40 PM    K 4.8 07/09/2021 09:54 AM    K 4.3 09/16/2020 03:20 PM    K 4.4 03/23/2020 12:00 AM    K 4.4 03/23/2020 12:00 AM    K 4.1 09/09/2019 12:00 AM    K 4.4 04/07/2018 10:40 PM     07/09/2021 09:54 AM     09/16/2020 03:20 PM     04/07/2018 10:40 PM    CO2 20 (L) 07/09/2021 09:54 AM    CO2 20 (L) 09/16/2020 03:20 PM    CO2 24 04/07/2018 10:40 PM    BUN 22 07/09/2021 09:54 AM    BUN 17 09/16/2020 03:20 PM    BUN 18 04/07/2018 10:40 PM    CREATININE 1.2 07/09/2021 09:54 AM    CREATININE 1.1 09/16/2020 03:20 PM    CREATININE 1.2 03/23/2020 12:00 AM    CREATININE 1.2 03/23/2020 12:00 AM    CREATININE 1.0 09/09/2019 12:00 AM    GLUCOSE 114 (H) 07/09/2021 09:54 AM    GLUCOSE 150 (H) 09/16/2020 03:20 PM    GLUCOSE 101 04/07/2018 10:40 PM    GLUCOSE 137 (H) 03/05/2012 08:54 AM    GLUCOSE 87 03/18/2011 07:05 AM    GLUCOSE 117 (H) 12/29/2010 08:37 AM    CALCIUM 10.3 (H) 07/09/2021 09:54 AM    CALCIUM 10.0 09/16/2020 03:20 PM    CALCIUM 9.2 04/07/2018 10:40 PM       Last 3 CMP:    Lab Results   Component Value Date/Time     07/09/2021 09:54 AM     09/16/2020 03:20 PM     04/07/2018 10:40 PM    K 4.8 07/09/2021 09:54 AM    K 4.3 09/16/2020 03:20 PM    K 4.4 03/23/2020 12:00 AM    K 4.4 03/23/2020 12:00 AM    K 4.1 09/09/2019 12:00 AM    K 4.4 04/07/2018 10:40 PM     07/09/2021 09:54 AM     09/16/2020 03:20 PM     04/07/2018 10:40 PM    CO2 20 (L) 07/09/2021 09:54 AM    CO2 20 (L) 09/16/2020 03:20 PM    CO2 24 04/07/2018 10:40 PM    BUN 22 07/09/2021 09:54 AM    BUN 17 09/16/2020 03:20 PM    BUN 18 04/07/2018 10:40 PM    CREATININE 1.2 07/09/2021 09:54 AM    CREATININE 1.1 09/16/2020 03:20 PM    CREATININE 1.2 03/23/2020 12:00 AM    CREATININE 1.2 03/23/2020 12:00 AM    CREATININE 1.0 09/09/2019 12:00 AM    GLUCOSE 114 (H) 07/09/2021 09:54 AM    GLUCOSE 150 (H) 09/16/2020 03:20 PM    GLUCOSE 101 04/07/2018 10:40 PM    GLUCOSE 137 (H) 03/05/2012 08:54 AM    GLUCOSE 87 03/18/2011 07:05 AM    GLUCOSE 117 (H) 12/29/2010 08:37 AM    CALCIUM 10.3 (H) 07/09/2021 09:54 AM    CALCIUM 10.0 09/16/2020 03:20 PM    CALCIUM 9.2 04/07/2018 10:40 PM    PROT 7.7 07/09/2021 09:54 AM    PROT 6.7 09/16/2020 03:20 PM    PROT 6.9 04/07/2018 10:40 PM    LABALBU 4.2 07/09/2021 09:54 AM    LABALBU 3.8 09/16/2020 03:20 PM    LABALBU 3.7 04/07/2018 10:40 PM    LABALBU 4.1 03/05/2012 08:54 AM    LABALBU 4.1 12/29/2010 08:37 AM    BILITOT 0.6 07/09/2021 09:54 AM    BILITOT 0.5 09/16/2020 03:20 PM    BILITOT 0.5 04/07/2018 10:40 PM    ALKPHOS 76 07/09/2021 09:54 AM    ALKPHOS 60 09/16/2020 03:20 PM    ALKPHOS 67 04/07/2018 10:40 PM    AST 17 07/09/2021 09:54 AM    AST 23 09/16/2020 03:20 PM    AST 20 04/07/2018 10:40 PM    ALT 6 07/09/2021 09:54 AM    ALT 9 09/16/2020 03:20 PM    ALT 9 04/07/2018 10:40 PM        CBC:   Lab Results   Component Value Date/Time    WBC 9.1 07/09/2021 09:54 AM    RBC 4.59 07/09/2021 09:54 AM    HGB 14.7 07/09/2021 09:54 AM    HCT 45.4 07/09/2021 09:54 AM    MCV 98.9 07/09/2021 09:54 AM    MCH 32.0 07/09/2021 09:54 AM    MCHC 32.4 07/09/2021 09:54 AM    RDW 14.0 07/09/2021 09:54 AM     07/09/2021 09:54 AM    MPV 11.0 07/09/2021 09:54 AM       A1C:  Lab Results   Component Value Date/Time    LABA1C 6.8 (H) 07/09/2021 09:54 AM       Lipid panel:  Lab Results   Component Value Date    CHOL 213 07/09/2021    CHOL 190 03/23/2020    CHOL 190 03/23/2020    TRIG 66 07/09/2021    TRIG 71 03/23/2020    TRIG 71 03/23/2020    HDL 57 07/09/2021    HDL 56 03/23/2020    HDL 56 03/23/2020        Lab Results   Component Value Date/Time    PROT 7.7 07/09/2021 09:54 AM    PROT 6.7 09/16/2020 03:20 PM    PROT 6.9 04/07/2018 10:40 PM    INR 1.1 08/23/2021 09:50 AM    INR 1.4 08/09/2021 10:02 AM    INR 1.7 07/28/2021 02:41 PM    INR 1.1 04/07/2018 10:40 PM    INR 1.7 09/12/2017 02:45 PM    INR 1.5 03/29/2017 07:34 AM       Lab Results   Component Value Date/Time    MG 1.7 09/12/2017 02:45 PM         Assessment. Blake Sanders was seen today for 1 month follow-up. Diagnoses and all orders for this visit:    Chronic diastolic congestive heart failure (HCC)    Paroxysmal A-fib ([address]  -     POCT INR    Essential hypertension    Controlled diabetes mellitus type 1 without complications (HCC)    Other orders  -     apixaban (ELIQUIS) 5 MG TABS tablet; Take 1 tablet by mouth 2 times daily  -     ramipril (ALTACE) 2.5 MG capsule; Take 1 capsule by mouth daily  -     furosemide (LASIX) 20 MG tablet;  Take 1 tablet by mouth daily       Patient Active Problem List   Diagnosis    Right cataract    Dislocated IOL (intraocular lens), posterior    Cataract of left eye    Essential hypertension    Inflammatory arthritis    Paroxysmal A-fib ([address]    Hyperlipidemia    Hyperglycemia    Pacemaker       Plan: Diastolic CHF responding nicely to diuretics decrease Lasix 20 mg daily  Continue elastic stockings  Low-salt diet  Keep legs elevated  Follow recommendation from cardiologist    Atrial fibrillation he is taking Coumadin 7 mg daily I have been increasing the Coumadin but INR still subtherapeutic today is only 1.1  I discussed about Eliquis he agreed to try at this time  Prescription for Eliquis 5 mg twice daily was given his GFR is greater than 60  Risk and benefit of medication discussed  Advised him to call the cardiologist and discussed with him  I advised him that his daughter Tarik Gerber to call me so I can discuss with her      Patient will call me back if Eliquis is too expensive then we'll have to adjust the dose of Coumadin    Hypertension /70, he was on higher dose of ramipril he was lightheaded it has been cut down to 2.5 and doing better      Diabetes type 2 A1c 6.8% he was on Metformin before he stopped it  He is declining to start any new meds right now  He will try the Eliquis first    Follow-up in 1 month    Card low-carb diet    Return in about 4 weeks (around 9/20/2021).        Kali Dos Santos MD  10:10 AM  8/23/2021     [place]

## 2021-09-27 NOTE — TELEPHONE ENCOUNTER
Patient seen in office
be seen in [facility] or ED. I am reaching out to the office to get approval for an office visit per disposition and patient request. Patient requesting office visit, states he will not go to [facility] or ED. Was unable to speak with a provider. Connected patient to the office. Care advice provided, patient verbalizes understanding; denies any other questions or concerns; instructed to call back for any new or worsening symptoms. Attention Provider: Thank you for allowing me to participate in the care of your patient. The patient was connected to triage in response to information provided to the [facility]. Please do not respond through this encounter as the response is not directed to a shared pool.

## 2021-11-02 ENCOUNTER — OFFICE VISIT (OUTPATIENT)
Dept: FAMILY MEDICINE CLINIC | Age: 86
End: 2021-11-02
Payer: MEDICARE

## 2021-11-02 VITALS
HEART RATE: 62 BPM | BODY MASS INDEX: 28.57 KG/M2 | TEMPERATURE: 97 F | OXYGEN SATURATION: 99 % | DIASTOLIC BLOOD PRESSURE: 70 MMHG | WEIGHT: 177 LBS | SYSTOLIC BLOOD PRESSURE: 130 MMHG

## 2021-11-02 DIAGNOSIS — I50.32 CHRONIC DIASTOLIC CONGESTIVE HEART FAILURE (HCC): ICD-10-CM

## 2021-11-02 DIAGNOSIS — E11.9 TYPE 2 DIABETES MELLITUS WITHOUT COMPLICATION, WITHOUT LONG-TERM CURRENT USE OF INSULIN (HCC): ICD-10-CM

## 2021-11-02 DIAGNOSIS — I10 ESSENTIAL HYPERTENSION: ICD-10-CM

## 2021-11-02 DIAGNOSIS — Z95.0 PACEMAKER: ICD-10-CM

## 2021-11-02 DIAGNOSIS — I48.0 PAROXYSMAL A-FIB (HCC): ICD-10-CM

## 2021-11-02 DIAGNOSIS — M19.90 INFLAMMATORY ARTHRITIS: ICD-10-CM

## 2021-11-02 DIAGNOSIS — M15.9 GENERALIZED OSTEOARTHRITIS: ICD-10-CM

## 2021-11-02 DIAGNOSIS — I50.32 CHRONIC DIASTOLIC CONGESTIVE HEART FAILURE (HCC): Primary | ICD-10-CM

## 2021-11-02 DIAGNOSIS — E78.5 HYPERLIPIDEMIA, UNSPECIFIED HYPERLIPIDEMIA TYPE: ICD-10-CM

## 2021-11-02 LAB
ALBUMIN SERPL-MCNC: 4.1 G/DL (ref 3.5–5.2)
ALP BLD-CCNC: 79 U/L (ref 40–129)
ALT SERPL-CCNC: 9 U/L (ref 0–40)
ANION GAP SERPL CALCULATED.3IONS-SCNC: 12 MMOL/L (ref 7–16)
AST SERPL-CCNC: 20 U/L (ref 0–39)
BASOPHILS ABSOLUTE: 0.1 E9/L (ref 0–0.2)
BASOPHILS RELATIVE PERCENT: 0.9 % (ref 0–2)
BILIRUB SERPL-MCNC: 0.5 MG/DL (ref 0–1.2)
BUN BLDV-MCNC: 35 MG/DL (ref 6–23)
CALCIUM SERPL-MCNC: 10.5 MG/DL (ref 8.6–10.2)
CHLORIDE BLD-SCNC: 102 MMOL/L (ref 98–107)
CHOLESTEROL, TOTAL: 229 MG/DL (ref 0–199)
CO2: 25 MMOL/L (ref 22–29)
CREAT SERPL-MCNC: 1.4 MG/DL (ref 0.7–1.2)
EOSINOPHILS ABSOLUTE: 0.31 E9/L (ref 0.05–0.5)
EOSINOPHILS RELATIVE PERCENT: 2.7 % (ref 0–6)
GFR AFRICAN AMERICAN: 58
GFR NON-AFRICAN AMERICAN: 48 ML/MIN/1.73
GLUCOSE BLD-MCNC: 122 MG/DL (ref 74–99)
HBA1C MFR BLD: 7.3 % (ref 4–5.6)
HCT VFR BLD CALC: 45 % (ref 37–54)
HDLC SERPL-MCNC: 57 MG/DL
HEMOGLOBIN: 14.6 G/DL (ref 12.5–16.5)
IMMATURE GRANULOCYTES #: 0.1 E9/L
IMMATURE GRANULOCYTES %: 0.9 % (ref 0–5)
LDL CHOLESTEROL CALCULATED: 155 MG/DL (ref 0–99)
LYMPHOCYTES ABSOLUTE: 0.98 E9/L (ref 1.5–4)
LYMPHOCYTES RELATIVE PERCENT: 8.6 % (ref 20–42)
MCH RBC QN AUTO: 31.9 PG (ref 26–35)
MCHC RBC AUTO-ENTMCNC: 32.4 % (ref 32–34.5)
MCV RBC AUTO: 98.3 FL (ref 80–99.9)
MONOCYTES ABSOLUTE: 0.98 E9/L (ref 0.1–0.95)
MONOCYTES RELATIVE PERCENT: 8.6 % (ref 2–12)
NEUTROPHILS ABSOLUTE: 8.88 E9/L (ref 1.8–7.3)
NEUTROPHILS RELATIVE PERCENT: 78.3 % (ref 43–80)
PDW BLD-RTO: 14.2 FL (ref 11.5–15)
PLATELET # BLD: 221 E9/L (ref 130–450)
PMV BLD AUTO: 11 FL (ref 7–12)
POTASSIUM SERPL-SCNC: 4.4 MMOL/L (ref 3.5–5)
RBC # BLD: 4.58 E12/L (ref 3.8–5.8)
SODIUM BLD-SCNC: 139 MMOL/L (ref 132–146)
TOTAL PROTEIN: 7.3 G/DL (ref 6.4–8.3)
TRIGL SERPL-MCNC: 83 MG/DL (ref 0–149)
VLDLC SERPL CALC-MCNC: 17 MG/DL
WBC # BLD: 11.4 E9/L (ref 4.5–11.5)

## 2021-11-02 PROCEDURE — 99213 OFFICE O/P EST LOW 20 MIN: CPT | Performed by: INTERNAL MEDICINE

## 2021-11-02 NOTE — PROGRESS NOTES
Subjective:     Chief Complaint   Patient presents with    Congestive Heart Failure   Patient here follow-up on the swelling of the legs and dyspnea doing much better he has been getting better with the Lasix    He was seen by cardiologist Dr. Barbie Rosario in 2021 patient says he did a stress test and echocardiogram which was okay  He had his pacemaker checked      He is tolerating Eliquis for atrial fibrillation  Denies any bleeding or bruising  History of diabetes he stopped taking the Metformin    Has generalized osteoarthritis    Past Medical History:   Diagnosis Date    A-fib [facility])     BPH (benign prostatic hyperplasia)     Diabetes mellitus ([facility]     Hyperlipidemia     Hypertension     Osteoarthritis     Renal cyst     Type 2 diabetes mellitus without complication ([facility]     Unspecified cerebral artery occlusion with cerebral infarction 10/2010    mild stroke        Social History     Socioeconomic History    Marital status:      Spouse name: Not on file    Number of children: Not on file    Years of education: Not on file    Highest education level: Not on file   Occupational History    Not on file   Tobacco Use    Smoking status: Former Smoker     Packs/day: 1.00     Years: 30.00     Pack years: 30.00     Quit date: 3/17/1998     Years since quittin.6    Smokeless tobacco: Former User     Quit date: 2015   Substance and Sexual Activity    Alcohol use: Yes     Comment: 2 beers daily    Drug use: No    Sexual activity: Not on file   Other Topics Concern    Not on file   Social History Narrative    Not on file     Social Determinants of Health     Financial Resource Strain:     Difficulty of Paying Living Expenses:    Food Insecurity: No Food Insecurity    Worried About Running Out of Food in the Last Year: Never true    Shayy of Food in the Last Year: Never true   Transportation Needs: No Transportation Needs    Lack of Transportation (Medical):  No    Lack of Transportation (Non-Medical): No   Physical Activity:     Days of Exercise per Week:     Minutes of Exercise per Session:    Stress:     Feeling of Stress :    Social Connections:     Frequency of Communication with Friends and Family:     Frequency of Social Gatherings with Friends and Family:     Attends Evangelical Services:     Active Member of Clubs or Organizations:     Attends Club or Organization Meetings:     Marital Status:    Intimate Partner Violence:     Fear of Current or Ex-Partner:     Emotionally Abused:     Physically Abused:     Sexually Abused:         Past Surgical History:   Procedure Laterality Date    CATARACT REMOVAL WITH IMPLANT Right 11 12 2013    CATARACT REMOVAL WITH IMPLANT Left 03/11/13    EYE SURGERY Right 11/15/13    dislocated intraocular lens    JOINT REPLACEMENT      left hip        Family History   Problem Relation Age of Onset    Asthma Mother     High Blood Pressure Mother     Stroke Mother         Allergies   Allergen Reactions    Lipitor [Atorvastatin Calcium] Other (See Comments)     Muscle cramps    Vytorin [Ezetimibe-Simvastatin]      Leg Cramps          ROS  No acute distress  Cardiac: Denies any chest pain or palpitation  History of A. fib, diastolic CHF, pacemaker,  Seen by Dr. Boyd Mc in August 2021  Respiratory: Denies any cough or shortness of breath  GI: No abdominal pain. Denies any nausea vomiting or diarrhea  Has declined colonoscopy  He has declined colonoscopy in the past  : Denies any dysuria frequency or hematuria  Neuro: No headache or dizziness  Endocrine: No diabetes  Skin: normal  No recent weight gain or weight loss  Denies any change in vision    Objective:    /70   Pulse 62   Temp 97 °F (36.1 °C)   Wt 177 lb (80.3 kg)   SpO2 99%   BMI 28.57 kg/m²     Constitutional: Alert awake and oriented  Eyes: Pupils equal bilaterally.  Extraocular muscles intact  Neck: no JVD adenopathy no bruit  Heart:  RRR, no murmurs, gallops, or rubs.  Lungs:    no wheeze, rales or rhonchi  Abd: bowel sounds present, nontender, nondistended, no masses  Extrem:  No clubbing, cyanosis,   Edema much improved  Neuro: AAOx3,No Focal deficit  Psychological: no depression or anxiety       Current Outpatient Medications   Medication Sig Dispense Refill    apixaban (ELIQUIS) 5 MG TABS tablet Take 1 tablet by mouth 2 times daily 180 tablet 1    ramipril (ALTACE) 2.5 MG capsule Take 1 capsule by mouth daily 90 capsule 1    furosemide (LASIX) 20 MG tablet Take 1 tablet by mouth daily 30 tablet 1    pravastatin (PRAVACHOL) 10 MG tablet Take 1 tablet by mouth daily 30 tablet 5    brimonidine-timolol (COMBIGAN) 0.2-0.5 % ophthalmic solution Place 1 drop into the right eye every 12 hours. 1 Bottle 1     No current facility-administered medications for this visit.         Last 3 BMP  Lab Results   Component Value Date/Time     07/09/2021 09:54 AM     09/16/2020 03:20 PM     04/07/2018 10:40 PM    K 4.8 07/09/2021 09:54 AM    K 4.3 09/16/2020 03:20 PM    K 4.4 03/23/2020 12:00 AM    K 4.4 03/23/2020 12:00 AM    K 4.1 09/09/2019 12:00 AM    K 4.4 04/07/2018 10:40 PM     07/09/2021 09:54 AM     09/16/2020 03:20 PM     04/07/2018 10:40 PM    CO2 20 (L) 07/09/2021 09:54 AM    CO2 20 (L) 09/16/2020 03:20 PM    CO2 24 04/07/2018 10:40 PM    BUN 22 07/09/2021 09:54 AM    BUN 17 09/16/2020 03:20 PM    BUN 18 04/07/2018 10:40 PM    CREATININE 1.2 07/09/2021 09:54 AM    CREATININE 1.1 09/16/2020 03:20 PM    CREATININE 1.2 03/23/2020 12:00 AM    CREATININE 1.2 03/23/2020 12:00 AM    CREATININE 1.0 09/09/2019 12:00 AM    GLUCOSE 114 (H) 07/09/2021 09:54 AM    GLUCOSE 150 (H) 09/16/2020 03:20 PM    GLUCOSE 101 04/07/2018 10:40 PM    GLUCOSE 137 (H) 03/05/2012 08:54 AM    GLUCOSE 87 03/18/2011 07:05 AM    GLUCOSE 117 (H) 12/29/2010 08:37 AM    CALCIUM 10.3 (H) 07/09/2021 09:54 AM    CALCIUM 10.0 09/16/2020 03:20 PM    CALCIUM 9.2 04/07/2018 10:40 PM Last 3 CMP:    Lab Results   Component Value Date/Time     07/09/2021 09:54 AM     09/16/2020 03:20 PM     04/07/2018 10:40 PM    K 4.8 07/09/2021 09:54 AM    K 4.3 09/16/2020 03:20 PM    K 4.4 03/23/2020 12:00 AM    K 4.4 03/23/2020 12:00 AM    K 4.1 09/09/2019 12:00 AM    K 4.4 04/07/2018 10:40 PM     07/09/2021 09:54 AM     09/16/2020 03:20 PM     04/07/2018 10:40 PM    CO2 20 (L) 07/09/2021 09:54 AM    CO2 20 (L) 09/16/2020 03:20 PM    CO2 24 04/07/2018 10:40 PM    BUN 22 07/09/2021 09:54 AM    BUN 17 09/16/2020 03:20 PM    BUN 18 04/07/2018 10:40 PM    CREATININE 1.2 07/09/2021 09:54 AM    CREATININE 1.1 09/16/2020 03:20 PM    CREATININE 1.2 03/23/2020 12:00 AM    CREATININE 1.2 03/23/2020 12:00 AM    CREATININE 1.0 09/09/2019 12:00 AM    GLUCOSE 114 (H) 07/09/2021 09:54 AM    GLUCOSE 150 (H) 09/16/2020 03:20 PM    GLUCOSE 101 04/07/2018 10:40 PM    GLUCOSE 137 (H) 03/05/2012 08:54 AM    GLUCOSE 87 03/18/2011 07:05 AM    GLUCOSE 117 (H) 12/29/2010 08:37 AM    CALCIUM 10.3 (H) 07/09/2021 09:54 AM    CALCIUM 10.0 09/16/2020 03:20 PM    CALCIUM 9.2 04/07/2018 10:40 PM    PROT 7.7 07/09/2021 09:54 AM    PROT 6.7 09/16/2020 03:20 PM    PROT 6.9 04/07/2018 10:40 PM    LABALBU 4.2 07/09/2021 09:54 AM    LABALBU 3.8 09/16/2020 03:20 PM    LABALBU 3.7 04/07/2018 10:40 PM    LABALBU 4.1 03/05/2012 08:54 AM    LABALBU 4.1 12/29/2010 08:37 AM    BILITOT 0.6 07/09/2021 09:54 AM    BILITOT 0.5 09/16/2020 03:20 PM    BILITOT 0.5 04/07/2018 10:40 PM    ALKPHOS 76 07/09/2021 09:54 AM    ALKPHOS 60 09/16/2020 03:20 PM    ALKPHOS 67 04/07/2018 10:40 PM    AST 17 07/09/2021 09:54 AM    AST 23 09/16/2020 03:20 PM    AST 20 04/07/2018 10:40 PM    ALT 6 07/09/2021 09:54 AM    ALT 9 09/16/2020 03:20 PM    ALT 9 04/07/2018 10:40 PM        CBC:   Lab Results   Component Value Date/Time    WBC 9.1 07/09/2021 09:54 AM    RBC 4.59 07/09/2021 09:54 AM    HGB 14.7 07/09/2021 09:54 AM    HCT 45.4 07/09/2021 09:54 AM    MCV 98.9 07/09/2021 09:54 AM    MCH 32.0 07/09/2021 09:54 AM    MCHC 32.4 07/09/2021 09:54 AM    RDW 14.0 07/09/2021 09:54 AM     07/09/2021 09:54 AM    MPV 11.0 07/09/2021 09:54 AM       A1C:  Lab Results   Component Value Date/Time    LABA1C 6.8 (H) 07/09/2021 09:54 AM       Lipid panel:  Lab Results   Component Value Date    CHOL 213 07/09/2021    CHOL 190 03/23/2020    CHOL 190 03/23/2020    TRIG 66 07/09/2021    TRIG 71 03/23/2020    TRIG 71 03/23/2020    HDL 57 07/09/2021    HDL 56 03/23/2020    HDL 56 03/23/2020        Lab Results   Component Value Date/Time    PROT 7.7 07/09/2021 09:54 AM    PROT 6.7 09/16/2020 03:20 PM    PROT 6.9 04/07/2018 10:40 PM    INR 1.1 08/23/2021 09:50 AM    INR 1.4 08/09/2021 10:02 AM    INR 1.7 07/28/2021 02:41 PM    INR 1.1 04/07/2018 10:40 PM    INR 1.7 09/12/2017 02:45 PM    INR 1.5 03/29/2017 07:34 AM       Lab Results   Component Value Date/Time    MG 1.7 09/12/2017 02:45 PM         Assessment. Bayron Lockwood was seen today for congestive heart failure. Diagnoses and all orders for this visit:    Chronic diastolic congestive heart failure ([address]  -     Cancel: CBC WITH AUTO DIFFERENTIAL; Future  -     Cancel: COMPREHENSIVE METABOLIC PANEL; Future    Paroxysmal A-fib (HCC)  -     Cancel: CBC WITH AUTO DIFFERENTIAL; Future  -     Cancel: COMPREHENSIVE METABOLIC PANEL; Future    Essential hypertension  -     Cancel: CBC WITH AUTO DIFFERENTIAL; Future  -     Cancel: COMPREHENSIVE METABOLIC PANEL; Future    Type 2 diabetes mellitus without complication, without long-term current use of insulin (HCC)  -     Cancel: CBC WITH AUTO DIFFERENTIAL; Future  -     Cancel: COMPREHENSIVE METABOLIC PANEL;  Future  -     Cancel: HEMOGLOBIN A1C; Future    Generalized osteoarthritis    Pacemaker       Patient Active Problem List   Diagnosis    Right cataract    Dislocated IOL (intraocular lens), posterior    Cataract of left eye    Essential hypertension    Inflammatory arthritis    [facility]ox[facility]    Hyperlipidemia    Hyperglycemia    Pacemaker       Plan: Chronic diastolic CHF cardiac work-up negative so far seen by cardiologist  Continue Lasix 20 mg daily check CMP today    Paroxysmal atrial fibrillation  Continue Eliquis rate is controlled    Pacemaker interrogated by cardiologist recently    Hypertension controlled continue ramipril      Generalized osteoarthritis Tylenol arthritis as needed    I will be retiring in 2 months he will see a different physician next visit  His daughter can call me if any question discussed with the pt. Check CBC, CMP, A1c today  A1c elevated resume Metformin    Return in about 3 months (around 2/2/2022).        Kings Orozco MD  11:36 AM  11/2/2021     [place]

## 2021-11-19 RX ORDER — GLIPIZIDE 2.5 MG/1
2.5 TABLET, EXTENDED RELEASE ORAL DAILY
Qty: 30 TABLET | Refills: 3 | Status: SHIPPED | OUTPATIENT
Start: 2021-11-19 | End: 2022-02-22 | Stop reason: SDUPTHER

## 2021-11-19 RX ORDER — PRAVASTATIN SODIUM 20 MG
20 TABLET ORAL DAILY
Qty: 30 TABLET | Refills: 3 | Status: SHIPPED | OUTPATIENT
Start: 2021-11-19 | End: 2022-02-23 | Stop reason: SDUPTHER

## 2021-11-19 NOTE — PROGRESS NOTES
Spoke to patient A1c increased to 7.3 start glipizide XL 2.5 daily did not give Metformin creatinine increased  Pravastatin 20 mg daily  Check CMP lipid profile A1c next visit

## 2022-02-22 ENCOUNTER — OFFICE VISIT (OUTPATIENT)
Dept: FAMILY MEDICINE CLINIC | Age: 87
End: 2022-02-22
Payer: MEDICARE

## 2022-02-22 VITALS
SYSTOLIC BLOOD PRESSURE: 100 MMHG | BODY MASS INDEX: 26.31 KG/M2 | DIASTOLIC BLOOD PRESSURE: 60 MMHG | RESPIRATION RATE: 18 BRPM | TEMPERATURE: 98.4 F | WEIGHT: 177.6 LBS | OXYGEN SATURATION: 95 % | HEART RATE: 107 BPM | HEIGHT: 69 IN

## 2022-02-22 DIAGNOSIS — N18.32 TYPE 2 DIABETES MELLITUS WITH STAGE 3B CHRONIC KIDNEY DISEASE, WITHOUT LONG-TERM CURRENT USE OF INSULIN (HCC): Primary | ICD-10-CM

## 2022-02-22 DIAGNOSIS — Q80.9 ICHTHYOSIS: ICD-10-CM

## 2022-02-22 DIAGNOSIS — E78.2 MIXED HYPERLIPIDEMIA: ICD-10-CM

## 2022-02-22 DIAGNOSIS — E11.22 TYPE 2 DIABETES MELLITUS WITH STAGE 3B CHRONIC KIDNEY DISEASE, WITHOUT LONG-TERM CURRENT USE OF INSULIN (HCC): Primary | ICD-10-CM

## 2022-02-22 DIAGNOSIS — I10 ESSENTIAL HYPERTENSION: ICD-10-CM

## 2022-02-22 DIAGNOSIS — I48.0 PAROXYSMAL A-FIB (HCC): ICD-10-CM

## 2022-02-22 LAB
CHP ED QC CHECK: NORMAL
CREATININE URINE POCT: 200
GLUCOSE BLD-MCNC: 120 MG/DL
HBA1C MFR BLD: 6.7 %
MICROALBUMIN/CREAT 24H UR: 80 MG/G{CREAT}
MICROALBUMIN/CREAT UR-RTO: >30

## 2022-02-22 PROCEDURE — 82044 UR ALBUMIN SEMIQUANTITATIVE: CPT | Performed by: NURSE PRACTITIONER

## 2022-02-22 PROCEDURE — 99214 OFFICE O/P EST MOD 30 MIN: CPT | Performed by: NURSE PRACTITIONER

## 2022-02-22 PROCEDURE — 83036 HEMOGLOBIN GLYCOSYLATED A1C: CPT | Performed by: NURSE PRACTITIONER

## 2022-02-22 PROCEDURE — 82962 GLUCOSE BLOOD TEST: CPT | Performed by: NURSE PRACTITIONER

## 2022-02-22 RX ORDER — FUROSEMIDE 20 MG/1
20 TABLET ORAL DAILY
Qty: 30 TABLET | Refills: 1 | Status: CANCELLED | OUTPATIENT
Start: 2022-02-22

## 2022-02-22 RX ORDER — RAMIPRIL 2.5 MG/1
2.5 CAPSULE ORAL DAILY
Qty: 90 CAPSULE | Refills: 1 | Status: CANCELLED | OUTPATIENT
Start: 2022-02-22 | End: 2022-05-23

## 2022-02-22 RX ORDER — PRAVASTATIN SODIUM 20 MG
20 TABLET ORAL DAILY
Qty: 30 TABLET | Refills: 3 | Status: CANCELLED | OUTPATIENT
Start: 2022-02-22

## 2022-02-22 RX ORDER — GLIPIZIDE 2.5 MG/1
2.5 TABLET, EXTENDED RELEASE ORAL DAILY
Qty: 90 TABLET | Refills: 1 | Status: SHIPPED
Start: 2022-02-22 | End: 2022-06-13 | Stop reason: SDUPTHER

## 2022-02-22 RX ORDER — GLIPIZIDE 2.5 MG/1
2.5 TABLET, EXTENDED RELEASE ORAL DAILY
Qty: 30 TABLET | Refills: 3 | Status: CANCELLED | OUTPATIENT
Start: 2022-02-22

## 2022-02-22 SDOH — ECONOMIC STABILITY: INCOME INSECURITY: IN THE LAST 12 MONTHS, WAS THERE A TIME WHEN YOU WERE NOT ABLE TO PAY THE MORTGAGE OR RENT ON TIME?: NO

## 2022-02-22 SDOH — ECONOMIC STABILITY: HOUSING INSECURITY
IN THE LAST 12 MONTHS, WAS THERE A TIME WHEN YOU DID NOT HAVE A STEADY PLACE TO SLEEP OR SLEPT IN A SHELTER (INCLUDING NOW)?: NO

## 2022-02-22 SDOH — ECONOMIC STABILITY: FOOD INSECURITY: WITHIN THE PAST 12 MONTHS, YOU WORRIED THAT YOUR FOOD WOULD RUN OUT BEFORE YOU GOT MONEY TO BUY MORE.: NEVER TRUE

## 2022-02-22 SDOH — HEALTH STABILITY: PHYSICAL HEALTH: ON AVERAGE, HOW MANY DAYS PER WEEK DO YOU ENGAGE IN MODERATE TO STRENUOUS EXERCISE (LIKE A BRISK WALK)?: 0 DAYS

## 2022-02-22 SDOH — ECONOMIC STABILITY: FOOD INSECURITY: WITHIN THE PAST 12 MONTHS, THE FOOD YOU BOUGHT JUST DIDN'T LAST AND YOU DIDN'T HAVE MONEY TO GET MORE.: NEVER TRUE

## 2022-02-22 ASSESSMENT — ENCOUNTER SYMPTOMS
SINUS PAIN: 0
TROUBLE SWALLOWING: 0
CHEST TIGHTNESS: 0
DIARRHEA: 0
COLOR CHANGE: 0
VOMITING: 0
RHINORRHEA: 0
SINUS PRESSURE: 0
SORE THROAT: 0
WHEEZING: 0
ABDOMINAL PAIN: 0
SHORTNESS OF BREATH: 0
FACIAL SWELLING: 0
VOICE CHANGE: 0
CONSTIPATION: 0
COUGH: 0
NAUSEA: 0
BACK PAIN: 0

## 2022-02-22 ASSESSMENT — SOCIAL DETERMINANTS OF HEALTH (SDOH)
HOW HARD IS IT FOR YOU TO PAY FOR THE VERY BASICS LIKE FOOD, HOUSING, MEDICAL CARE, AND HEATING?: NOT HARD AT ALL
WITHIN THE LAST YEAR, HAVE YOU BEEN HUMILIATED OR EMOTIONALLY ABUSED IN OTHER WAYS BY YOUR PARTNER OR EX-PARTNER?: NO
WITHIN THE LAST YEAR, HAVE YOU BEEN AFRAID OF YOUR PARTNER OR EX-PARTNER?: NO
WITHIN THE LAST YEAR, HAVE TO BEEN RAPED OR FORCED TO HAVE ANY KIND OF SEXUAL ACTIVITY BY YOUR PARTNER OR EX-PARTNER?: NO
WITHIN THE LAST YEAR, HAVE YOU BEEN KICKED, HIT, SLAPPED, OR OTHERWISE PHYSICALLY HURT BY YOUR PARTNER OR EX-PARTNER?: NO

## 2022-02-22 ASSESSMENT — LIFESTYLE VARIABLES
HOW OFTEN DO YOU HAVE A DRINK CONTAINING ALCOHOL: MONTHLY OR LESS
HOW MANY STANDARD DRINKS CONTAINING ALCOHOL DO YOU HAVE ON A TYPICAL DAY: 1 OR 2

## 2022-02-22 NOTE — PATIENT INSTRUCTIONS
The medication list included in this document is our record of what you are currently taking, including any changes that were made at today's visit.  If you find any differences when compared to your medications at home, or have any questions that were not answered at your visit, please contact the office.     Labs tomorrow  Increase water intake due to renal declined  Decrease coffee and beer intake

## 2022-02-22 NOTE — ASSESSMENT & PLAN NOTE
New Well-controlled, continue current plan pending work up below, medication adherence emphasized and lifestyle modifications recommended

## 2022-02-22 NOTE — ASSESSMENT & PLAN NOTE
New Uncontrolled, continue current plan pending work up below, medication adherence emphasized, lifestyle modifications recommended and after review of labs discussed increasing the Pravastatin 40 mg nightly and he agrees will recheck labs in 3 months for effectiveness

## 2022-02-22 NOTE — ASSESSMENT & PLAN NOTE
New Well-controlled, continue current plan pending work up below, medication adherence emphasized, lifestyle modifications recommended and increase water intake and cut down on the coffee and beer, Eliquis is based on renal function so if continues to decline will have to adjust the dose.  Keep appt with Dr Agnes Blake and have notes sent to me

## 2022-02-22 NOTE — ASSESSMENT & PLAN NOTE
New Well-controlled, continue current plan pending work up below, medication adherence emphasized, lifestyle modifications recommended and increase water intake cut down on coffee and beer     Declines all vaccines

## 2022-02-22 NOTE — PROGRESS NOTES
OFFICE PROGRESS NOTE  [address]  [address] [id number]  Dept: [phone]   Chief Complaint   Patient presents with   [address] Maintenance     due for AWV, declined all vaccines       ASSESSMENT/PLAN   1. Type 2 diabetes mellitus with stage 3b chronic kidney disease, without long-term current use of insulin (HCC)  Assessment & Plan:  New Well-controlled, continue current plan pending work up below, medication adherence emphasized, lifestyle modifications recommended and increase water intake cut down on coffee and beer     Declines all vaccines  Orders:  -     POCT Glucose  -     POCT glycosylated hemoglobin (Hb A1C)  -     CBC with Auto Differential; Future  -     Comprehensive Metabolic Panel; Future  -     Lipid Panel; Future  -     POCT microalbumin  -     glipiZIDE (GLUCOTROL XL) 2.5 MG extended release tablet; Take 1 tablet by mouth daily, Disp-90 tablet, R-1Normal  -     HM DIABETES FOOT EXAM  2. Essential hypertension  Assessment & Plan:  New Well-controlled, continue current plan pending work up below, medication adherence emphasized and lifestyle modifications recommended  Orders:  -     CBC with Auto Differential; Future  -     Comprehensive Metabolic Panel; Future  -     Lipid Panel; Future  3. Mixed hyperlipidemia  Assessment & Plan:  New Uncontrolled, continue current plan pending work up below, medication adherence emphasized, lifestyle modifications recommended and after review of labs discussed increasing the Pravastatin 40 mg nightly and he agrees will recheck labs in 3 months for effectiveness  Orders:  -     Lipid Panel; Future  4.  Paroxysmal A-fib [facility])  Assessment & Plan:  New Well-controlled, continue current plan pending work up below, medication adherence emphasized, lifestyle modifications recommended and increase water intake and cut down on the coffee and beer, Eliquis is based on renal function so if continues to decline will have to adjust the dose. Keep appt with Dr Irena Hawley and have notes sent to me  5. Ichthyosis  Assessment & Plan:  New Borderline controlled, lifestyle modifications recommended and increase water cut down on caffeine, beer. See podiatrist and try Catrachita lotion twice a day to affected areas. Reviewed labs: CMP, CBCD, Lipids, A1c 11/2/2021 not well controlled cholesterol or A1c, renal insufficiency noted  Reviewed notes from DR [facility] 11/2/21      Discussed reducing caffeine intake, Discussed exercising 30 minutes daily and Discussed taking medications as directed and adverse effects    Return in about 3 months (around 5/22/2022) for Medicare well appt then in 6 months DM, HTN, hyperlipidemia. HPI:   Here to establish care was seeing Dr [facility] accompanied by daughter Misael Clemente    He has DM stopped taking metformin, hyperlipidemia, HTN, atrial fib on Eliquis, CHF, pacemaker follows with Dr Irena Hawley last visit 8/2021    80 y.o. male presents today for follow-up of diabetes [place]. In-office bloodsugar is:   Results for POC orders placed in visit on 02/22/22   POCT glycosylated hemoglobin (Hb A1C)   Result Value Ref Range    Hemoglobin A1C 6.7 %   POCT Glucose   Result Value Ref Range    Glucose 120 mg/dL    QC OK? Patient reports being somewhat compliant to low carbohydrate diet and increasing weekly exercises. Currently, the patient is treated with medication(s): glipizide XL 2.5 mg daily. Patient reports not checking home blood sugar  Patient denies hypoglycemic episodes and understand to take sweetened beverages or a snack if hypoglycemic symptoms are suspected. Hypertension: Patient here for follow-up of elevated blood pressure. He is exercising and is adherent to low salt diet. Blood pressure is well controlled at home. Cardiac symptoms ankle swelling.  Patient denies chest pain, chest pressure/discomfort, claudication, dyspnea, exertional chest pressure/discomfort, fatigue, irregular heart beat, near-syncope, orthopnea, palpitations, paroxysmal nocturnal dyspnea, syncope and tachypnea. Cardiovascular risk factors: advanced age (older than 54 for men, 72 for women), diabetes mellitus, dyslipidemia, hypertension, male gender, sedentary lifestyle and smoking/ tobacco exposure. Use of agents associated with hypertension: none. History of target organ damage: chronic kidney disease and stroke. Hyperlipidemia: Patient presents with hyperlipidemia. He was tested because DM, HTN, hyperlipidemia. His last labs 11/2021 showed Total cholesterol of 229, HDL 57, ,  Triglycerides 83. He complains of ankle swelling after he is up during the day. Denies chest pain, dyspnea, exertional chest pressure/discomfort, fatigue, feeding intolerance, palpitations, poor exercise tolerance, syncope, tachypnea and skin xanthelasma. There is not a family history of hyperlipidemia. There is not a family history of early ischemia heart disease. AF on Eliquis well controlled. Discussed need to recheck renal function and if declines will need to adjust the dose. He is drinking a lot of coffee daily, 4 beers nightly but not much water.            Today's vital signs are as follows:  /60 (Site: Left Upper Arm, Position: Sitting, Cuff Size: Medium Adult)   Pulse 107   Temp 98.4 °F (36.9 °C)   Resp 18   Ht 5' 9\" (1.753 m)   Wt 177 lb 9.6 oz (80.6 kg)   SpO2 95%   BMI 26.23 kg/m²     Lab Results   Component Value Date    LABA1C 6.7 02/22/2022   0/10 Pain scale:      Current Outpatient Medications:     glipiZIDE (GLUCOTROL XL) 2.5 MG extended release tablet, Take 1 tablet by mouth daily, Disp: 90 tablet, Rfl: 1    pravastatin (PRAVACHOL) 20 MG tablet, Take 1 tablet by mouth daily, Disp: 30 tablet, Rfl: 3    apixaban (ELIQUIS) 5 MG TABS tablet, Take 1 tablet by mouth 2 times daily, Disp: 180 tablet, Rfl: 1    ramipril (ALTACE) 2.5 MG capsule, Take 1 capsule by mouth daily, Disp: 90 capsule, Rfl: 1    furosemide (LASIX) 20 MG tablet, Take 1 tablet by mouth daily, Disp: 30 tablet, Rfl: 1    brimonidine-timolol (COMBIGAN) 0.2-0.5 % ophthalmic solution, Place 1 drop into the right eye every 12 hours. , Disp: 1 Bottle, Rfl: 1      Surgical History:  has a past surgical history that includes joint replacement; Cataract removal with implant (Right, 11 12 2013); eye surgery (Right, 11/15/13); and Cataract removal with implant (Left, 03/11/13). Social History:  reports that he quit smoking about 23 years ago. He has a 30.00 pack-year smoking history. He quit smokeless tobacco use about 6 years ago. He reports current alcohol use. He reports that he does not use drugs. Family History: family history includes Asthma in his mother; High Blood Pressure in his mother; Stroke in his mother. I have reviewed Omar's allergies, medications, problem list, medical, social and family history and have updated as needed in the electronic medical record    Review of Systems   Constitutional: Negative for activity change, appetite change, chills, diaphoresis, fatigue, fever and unexpected weight change. HENT: Positive for hearing loss. Negative for congestion, dental problem, drooling, ear discharge, ear pain, facial swelling, mouth sores, nosebleeds, postnasal drip, rhinorrhea, sinus pressure, sinus pain, sneezing, sore throat, tinnitus, trouble swallowing and voice change. Eyes: Negative for visual disturbance. Respiratory: Negative for cough, chest tightness, shortness of breath and wheezing. Cardiovascular: Negative for chest pain, palpitations and leg swelling. Gastrointestinal: Negative for abdominal pain, constipation, diarrhea, nausea and vomiting. Endocrine: Negative for cold intolerance, heat intolerance, polydipsia, polyphagia and polyuria. Genitourinary: Negative for difficulty urinating, frequency and urgency.    Musculoskeletal: Negative for arthralgias, back nodules  Cardiovascular: regular rate and regular rhythm, normal S1 and S2,  no murmurs, rubs, clicks, or gallop. Distal pulses intact, no carotid bruits. No edema  Pulmonary/Chest: clear to auscultation bilaterally, no wheezes, rales or rhonchi, normal air movement, no respiratory distress  Abdomen: soft, non-tender, non-distended, normal bowel sounds, no masses or hepatosplenomegaly  Musculoskeletal: Normal ROM, no joint swelling, deformity or tenderness   Neurologic:  gait, coordination and speech normal  Extremities: no clubbing, cyanosis, or edema. Psychiatric: Good eye contact, normal mood and affect, answers questions appropriately  Foot Exam:  No signs of infection and/or necrosis noted. Sensation decreased bilaterally. Monofilament normal bilateral,  Interdigit spaces exhibit  abnormal skin changes and/or surface growth, long thickened toenails. I have reviewed my findings and recommendations with Michael Ellis.     Andrew Storey, APRN - CNP, NP-C, FNP-BC

## 2022-02-22 NOTE — ASSESSMENT & PLAN NOTE
New Borderline controlled, lifestyle modifications recommended and increase water cut down on caffeine, beer. See podiatrist and try Catrachita lotion twice a day to affected areas.

## 2022-02-23 ENCOUNTER — TELEPHONE (OUTPATIENT)
Dept: FAMILY MEDICINE CLINIC | Age: 87
End: 2022-02-23

## 2022-02-23 DIAGNOSIS — E11.22 TYPE 2 DIABETES MELLITUS WITH STAGE 3B CHRONIC KIDNEY DISEASE, WITHOUT LONG-TERM CURRENT USE OF INSULIN (HCC): ICD-10-CM

## 2022-02-23 DIAGNOSIS — E78.2 MIXED HYPERLIPIDEMIA: ICD-10-CM

## 2022-02-23 DIAGNOSIS — N18.32 TYPE 2 DIABETES MELLITUS WITH STAGE 3B CHRONIC KIDNEY DISEASE, WITHOUT LONG-TERM CURRENT USE OF INSULIN (HCC): ICD-10-CM

## 2022-02-23 DIAGNOSIS — I10 ESSENTIAL HYPERTENSION: ICD-10-CM

## 2022-02-23 DIAGNOSIS — E78.2 MIXED HYPERLIPIDEMIA: Primary | ICD-10-CM

## 2022-02-23 DIAGNOSIS — I48.0 PAROXYSMAL A-FIB (HCC): ICD-10-CM

## 2022-02-23 LAB
ALBUMIN SERPL-MCNC: 3.9 G/DL (ref 3.5–5.2)
ALP BLD-CCNC: 79 U/L (ref 40–129)
ALT SERPL-CCNC: 7 U/L (ref 0–40)
ANION GAP SERPL CALCULATED.3IONS-SCNC: 9 MMOL/L (ref 7–16)
AST SERPL-CCNC: 18 U/L (ref 0–39)
BASOPHILS ABSOLUTE: 0.09 E9/L (ref 0–0.2)
BASOPHILS RELATIVE PERCENT: 1 % (ref 0–2)
BILIRUB SERPL-MCNC: 0.4 MG/DL (ref 0–1.2)
BUN BLDV-MCNC: 27 MG/DL (ref 6–23)
CALCIUM SERPL-MCNC: 10 MG/DL (ref 8.6–10.2)
CHLORIDE BLD-SCNC: 100 MMOL/L (ref 98–107)
CHOLESTEROL, TOTAL: 208 MG/DL (ref 0–199)
CO2: 26 MMOL/L (ref 22–29)
CREAT SERPL-MCNC: 1.3 MG/DL (ref 0.7–1.2)
EOSINOPHILS ABSOLUTE: 0.38 E9/L (ref 0.05–0.5)
EOSINOPHILS RELATIVE PERCENT: 4.1 % (ref 0–6)
GFR AFRICAN AMERICAN: >60
GFR NON-AFRICAN AMERICAN: 52 ML/MIN/1.73
GLUCOSE BLD-MCNC: 137 MG/DL (ref 74–99)
HCT VFR BLD CALC: 44.9 % (ref 37–54)
HDLC SERPL-MCNC: 53 MG/DL
HEMOGLOBIN: 14.3 G/DL (ref 12.5–16.5)
IMMATURE GRANULOCYTES #: 0.07 E9/L
IMMATURE GRANULOCYTES %: 0.8 % (ref 0–5)
LDL CHOLESTEROL CALCULATED: 141 MG/DL (ref 0–99)
LYMPHOCYTES ABSOLUTE: 1.33 E9/L (ref 1.5–4)
LYMPHOCYTES RELATIVE PERCENT: 14.4 % (ref 20–42)
MCH RBC QN AUTO: 32.4 PG (ref 26–35)
MCHC RBC AUTO-ENTMCNC: 31.8 % (ref 32–34.5)
MCV RBC AUTO: 101.6 FL (ref 80–99.9)
MONOCYTES ABSOLUTE: 0.87 E9/L (ref 0.1–0.95)
MONOCYTES RELATIVE PERCENT: 9.4 % (ref 2–12)
NEUTROPHILS ABSOLUTE: 6.5 E9/L (ref 1.8–7.3)
NEUTROPHILS RELATIVE PERCENT: 70.3 % (ref 43–80)
PDW BLD-RTO: 13.3 FL (ref 11.5–15)
PLATELET # BLD: 199 E9/L (ref 130–450)
PMV BLD AUTO: 11.1 FL (ref 7–12)
POTASSIUM SERPL-SCNC: 4.7 MMOL/L (ref 3.5–5)
RBC # BLD: 4.42 E12/L (ref 3.8–5.8)
SODIUM BLD-SCNC: 135 MMOL/L (ref 132–146)
TOTAL PROTEIN: 7 G/DL (ref 6.4–8.3)
TRIGL SERPL-MCNC: 70 MG/DL (ref 0–149)
VLDLC SERPL CALC-MCNC: 14 MG/DL
WBC # BLD: 9.2 E9/L (ref 4.5–11.5)

## 2022-02-23 RX ORDER — RAMIPRIL 2.5 MG/1
2.5 CAPSULE ORAL DAILY
Qty: 90 CAPSULE | Refills: 1 | Status: SHIPPED
Start: 2022-02-23 | End: 2022-06-13 | Stop reason: SDUPTHER

## 2022-02-23 RX ORDER — FUROSEMIDE 20 MG/1
20 TABLET ORAL DAILY
Qty: 30 TABLET | Refills: 1 | Status: SHIPPED
Start: 2022-02-23 | End: 2022-06-13 | Stop reason: SDUPTHER

## 2022-02-23 RX ORDER — PRAVASTATIN SODIUM 40 MG
40 TABLET ORAL NIGHTLY
Qty: 90 TABLET | Refills: 1 | Status: SHIPPED
Start: 2022-02-23 | End: 2022-06-13 | Stop reason: SDUPTHER

## 2022-02-23 NOTE — TELEPHONE ENCOUNTER
Call Jessa Ag, his renal function looks good continue to drink water.  Would he consider trying to increase the Pravachol to 40 mg as the lipids aren't really very good

## 2022-03-17 ENCOUNTER — APPOINTMENT (OUTPATIENT)
Dept: CT IMAGING | Age: 87
DRG: 536 | End: 2022-03-17
Payer: MEDICARE

## 2022-03-17 ENCOUNTER — HOSPITAL ENCOUNTER (INPATIENT)
Age: 87
LOS: 4 days | Discharge: NURSING, REHAB OR LONG-TERM CARE | DRG: 536 | End: 2022-03-21
Attending: EMERGENCY MEDICINE | Admitting: FAMILY MEDICINE
Payer: MEDICARE

## 2022-03-17 ENCOUNTER — APPOINTMENT (OUTPATIENT)
Dept: GENERAL RADIOLOGY | Age: 87
DRG: 536 | End: 2022-03-17
Payer: MEDICARE

## 2022-03-17 DIAGNOSIS — S72.115A CLOSED NONDISPLACED FRACTURE OF GREATER TROCHANTER OF LEFT FEMUR, INITIAL ENCOUNTER (HCC): Primary | ICD-10-CM

## 2022-03-17 PROBLEM — S72.002A HIP FRACTURE REQUIRING OPERATIVE REPAIR, LEFT, CLOSED, INITIAL ENCOUNTER (HCC): Status: ACTIVE | Noted: 2022-03-17

## 2022-03-17 LAB
ACETAMINOPHEN LEVEL: <5 MCG/ML (ref 10–30)
ALBUMIN SERPL-MCNC: 3.8 G/DL (ref 3.5–5.2)
ALP BLD-CCNC: 87 U/L (ref 40–129)
ALT SERPL-CCNC: 8 U/L (ref 0–40)
ANION GAP SERPL CALCULATED.3IONS-SCNC: 10 MMOL/L (ref 7–16)
APTT: 34.9 SEC (ref 24.5–35.1)
AST SERPL-CCNC: 29 U/L (ref 0–39)
BASOPHILS ABSOLUTE: 0.08 E9/L (ref 0–0.2)
BASOPHILS RELATIVE PERCENT: 0.8 % (ref 0–2)
BILIRUB SERPL-MCNC: 0.4 MG/DL (ref 0–1.2)
BUN BLDV-MCNC: 22 MG/DL (ref 6–23)
CALCIUM SERPL-MCNC: 10.1 MG/DL (ref 8.6–10.2)
CHLORIDE BLD-SCNC: 101 MMOL/L (ref 98–107)
CO2: 26 MMOL/L (ref 22–29)
CREAT SERPL-MCNC: 1.2 MG/DL (ref 0.7–1.2)
EOSINOPHILS ABSOLUTE: 0.23 E9/L (ref 0.05–0.5)
EOSINOPHILS RELATIVE PERCENT: 2.2 % (ref 0–6)
ETHANOL: 23 MG/DL (ref 0–0.08)
GFR AFRICAN AMERICAN: >60
GFR NON-AFRICAN AMERICAN: 57 ML/MIN/1.73
GLUCOSE BLD-MCNC: 77 MG/DL (ref 74–99)
HCT VFR BLD CALC: 42.3 % (ref 37–54)
HEMOGLOBIN: 13.7 G/DL (ref 12.5–16.5)
IMMATURE GRANULOCYTES #: 0.1 E9/L
IMMATURE GRANULOCYTES %: 1 % (ref 0–5)
INR BLD: 1.1
LYMPHOCYTES ABSOLUTE: 1.15 E9/L (ref 1.5–4)
LYMPHOCYTES RELATIVE PERCENT: 11 % (ref 20–42)
MCH RBC QN AUTO: 32.5 PG (ref 26–35)
MCHC RBC AUTO-ENTMCNC: 32.4 % (ref 32–34.5)
MCV RBC AUTO: 100.5 FL (ref 80–99.9)
MONOCYTES ABSOLUTE: 0.98 E9/L (ref 0.1–0.95)
MONOCYTES RELATIVE PERCENT: 9.4 % (ref 2–12)
NEUTROPHILS ABSOLUTE: 7.92 E9/L (ref 1.8–7.3)
NEUTROPHILS RELATIVE PERCENT: 75.6 % (ref 43–80)
PDW BLD-RTO: 13.6 FL (ref 11.5–15)
PLATELET # BLD: 226 E9/L (ref 130–450)
PMV BLD AUTO: 10.6 FL (ref 7–12)
POTASSIUM REFLEX MAGNESIUM: 3.9 MMOL/L (ref 3.5–5)
PROTHROMBIN TIME: 12.7 SEC (ref 9.3–12.4)
RBC # BLD: 4.21 E12/L (ref 3.8–5.8)
SALICYLATE, SERUM: <0.3 MG/DL (ref 0–30)
SODIUM BLD-SCNC: 137 MMOL/L (ref 132–146)
TOTAL PROTEIN: 7.1 G/DL (ref 6.4–8.3)
TRICYCLIC ANTIDEPRESSANTS SCREEN SERUM: NEGATIVE NG/ML
WBC # BLD: 10.5 E9/L (ref 4.5–11.5)

## 2022-03-17 PROCEDURE — 80053 COMPREHEN METABOLIC PANEL: CPT

## 2022-03-17 PROCEDURE — 6360000002 HC RX W HCPCS: Performed by: EMERGENCY MEDICINE

## 2022-03-17 PROCEDURE — 73700 CT LOWER EXTREMITY W/O DYE: CPT

## 2022-03-17 PROCEDURE — 70450 CT HEAD/BRAIN W/O DYE: CPT

## 2022-03-17 PROCEDURE — 36415 COLL VENOUS BLD VENIPUNCTURE: CPT

## 2022-03-17 PROCEDURE — 82077 ASSAY SPEC XCP UR&BREATH IA: CPT

## 2022-03-17 PROCEDURE — 85610 PROTHROMBIN TIME: CPT

## 2022-03-17 PROCEDURE — 1200000000 HC SEMI PRIVATE

## 2022-03-17 PROCEDURE — 85730 THROMBOPLASTIN TIME PARTIAL: CPT

## 2022-03-17 PROCEDURE — 96374 THER/PROPH/DIAG INJ IV PUSH: CPT

## 2022-03-17 PROCEDURE — 72125 CT NECK SPINE W/O DYE: CPT

## 2022-03-17 PROCEDURE — 85025 COMPLETE CBC W/AUTO DIFF WBC: CPT

## 2022-03-17 PROCEDURE — 80143 DRUG ASSAY ACETAMINOPHEN: CPT

## 2022-03-17 PROCEDURE — 96361 HYDRATE IV INFUSION ADD-ON: CPT

## 2022-03-17 PROCEDURE — 80307 DRUG TEST PRSMV CHEM ANLYZR: CPT

## 2022-03-17 PROCEDURE — 73502 X-RAY EXAM HIP UNI 2-3 VIEWS: CPT

## 2022-03-17 PROCEDURE — 80179 DRUG ASSAY SALICYLATE: CPT

## 2022-03-17 PROCEDURE — 99283 EMERGENCY DEPT VISIT LOW MDM: CPT

## 2022-03-17 PROCEDURE — 2580000003 HC RX 258: Performed by: EMERGENCY MEDICINE

## 2022-03-17 RX ORDER — BRIMONIDINE TARTRATE 2 MG/ML
1 SOLUTION/ DROPS OPHTHALMIC 2 TIMES DAILY
Status: DISCONTINUED | OUTPATIENT
Start: 2022-03-17 | End: 2022-03-21 | Stop reason: HOSPADM

## 2022-03-17 RX ORDER — FENTANYL CITRATE 0.05 MG/ML
50 INJECTION, SOLUTION INTRAMUSCULAR; INTRAVENOUS ONCE
Status: COMPLETED | OUTPATIENT
Start: 2022-03-17 | End: 2022-03-17

## 2022-03-17 RX ORDER — PRAVASTATIN SODIUM 20 MG
40 TABLET ORAL NIGHTLY
Status: DISCONTINUED | OUTPATIENT
Start: 2022-03-17 | End: 2022-03-21 | Stop reason: HOSPADM

## 2022-03-17 RX ORDER — 0.9 % SODIUM CHLORIDE 0.9 %
500 INTRAVENOUS SOLUTION INTRAVENOUS ONCE
Status: COMPLETED | OUTPATIENT
Start: 2022-03-17 | End: 2022-03-17

## 2022-03-17 RX ORDER — HYDROCODONE BITARTRATE AND ACETAMINOPHEN 5; 325 MG/1; MG/1
1 TABLET ORAL EVERY 4 HOURS PRN
Status: DISCONTINUED | OUTPATIENT
Start: 2022-03-17 | End: 2022-03-21 | Stop reason: HOSPADM

## 2022-03-17 RX ORDER — HYDROCODONE BITARTRATE AND ACETAMINOPHEN 5; 325 MG/1; MG/1
2 TABLET ORAL EVERY 4 HOURS PRN
Status: DISCONTINUED | OUTPATIENT
Start: 2022-03-17 | End: 2022-03-21 | Stop reason: HOSPADM

## 2022-03-17 RX ADMIN — SODIUM CHLORIDE 500 ML: 9 INJECTION, SOLUTION INTRAVENOUS at 16:40

## 2022-03-17 RX ADMIN — FENTANYL CITRATE 50 MCG: 50 INJECTION INTRAMUSCULAR; INTRAVENOUS at 18:25

## 2022-03-17 ASSESSMENT — PAIN SCALES - GENERAL
PAINLEVEL_OUTOF10: 4
PAINLEVEL_OUTOF10: 6
PAINLEVEL_OUTOF10: 8

## 2022-03-17 ASSESSMENT — ENCOUNTER SYMPTOMS
RHINORRHEA: 0
VOMITING: 0
VOICE CHANGE: 0
NAUSEA: 0
DIARRHEA: 0
ABDOMINAL PAIN: 0
TROUBLE SWALLOWING: 0
PHOTOPHOBIA: 0
COUGH: 0
SHORTNESS OF BREATH: 0

## 2022-03-17 ASSESSMENT — PAIN DESCRIPTION - PAIN TYPE: TYPE: ACUTE PAIN

## 2022-03-17 ASSESSMENT — PAIN DESCRIPTION - LOCATION: LOCATION: HIP

## 2022-03-17 ASSESSMENT — PAIN DESCRIPTION - ORIENTATION: ORIENTATION: LEFT

## 2022-03-17 NOTE — PROGRESS NOTES
Orthopedic consult received    Imaging show L non-displaced periprosthetic greater trochanteric femur fracture    Plan  NWB LLE  Multimodal pain control  CT left hip    Formal consult to follow  Electronically signed by Gillian Lange DO on 3/17/2022 at 7:23 PM

## 2022-03-17 NOTE — ED PROVIDER NOTES
HPI   Patient is a 80-year-old male with past medical history of hypertension, A. fib on Eliquis, hyperlipidemia, diabetes and CAD presents to the emergency department due to unwitnessed mechanical fall at home. Patient apparently was having several beers and was walking in the driveway when he felt uneasy on his feet and fell from the ground level. Patient states that he hurt his left hip during the fall but denies any head trauma or loss consciousness. Patient is on blood thinners. Other than pain in his left hip, patient is denying any pain anywhere else. He has no obvious signs of external injuries or deformities to the head, face, neck, chest, trunk or extremities. Patient otherwise denying any prodromal symptoms before his fall. He also states he has no chest pain, shortness of breath, nausea, vomiting, fever, chills, lightheadedness, headache, vision changes, numbness, tingling, ataxia, urinary symptoms, constipation or diarrhea. Review of Systems   Constitutional: Negative for chills and fever. HENT: Negative for congestion, rhinorrhea, trouble swallowing and voice change. Eyes: Negative for photophobia and visual disturbance. Respiratory: Negative for cough and shortness of breath. Cardiovascular: Negative for chest pain and palpitations. Gastrointestinal: Negative for abdominal pain, diarrhea, nausea and vomiting. Genitourinary: Negative for dysuria, flank pain, hematuria and urgency. Musculoskeletal: Negative for arthralgias. Left hip pain   Skin: Negative for rash and wound. Neurological: Negative for dizziness and headaches. Psychiatric/Behavioral: Negative for behavioral problems and confusion. Physical Exam  Constitutional:       General: He is not in acute distress. Appearance: Normal appearance. He is not ill-appearing. HENT:      Head: Normocephalic and atraumatic.       Right Ear: External ear normal.      Left Ear: External ear normal.      Nose: Nose normal.      Mouth/Throat:      Mouth: Mucous membranes are moist.      Pharynx: Oropharynx is clear. Eyes:      Conjunctiva/sclera: Conjunctivae normal.   Cardiovascular:      Rate and Rhythm: Normal rate and regular rhythm. Pulses: Normal pulses. Heart sounds: Normal heart sounds. Pulmonary:      Effort: Pulmonary effort is normal. No respiratory distress. Breath sounds: Normal breath sounds. No wheezing or rales. Abdominal:      General: Abdomen is flat. Palpations: Abdomen is soft. Tenderness: There is no abdominal tenderness. There is no guarding or rebound. Musculoskeletal:      Cervical back: Normal range of motion and neck supple. Right lower leg: No edema. Left lower leg: No edema. Comments: Tenderness over left hip. ROM severely limited on the left secondary to pain and injury. Left and right lower extremities neurovascularly intact. Good TP/DP pulses palpated. No noted sensory deficits. Skin:     General: Skin is warm and dry. Capillary Refill: Capillary refill takes less than 2 seconds. Neurological:      General: No focal deficit present. Mental Status: He is alert and oriented to person, place, and time. Psychiatric:         Mood and Affect: Mood normal.         Behavior: Behavior normal.            MDM   Patient is a 69-year-old male with past medical history of hypertension, A. fib on Eliquis, hyperlipidemia, diabetes and CAD presenting to emergency department status post unwitnessed mechanical fall. Patient had apparently been drinking several beers and fell in his driveway at home. Patient was knocked down for long before his family was able to call for help. Patient was brought in by EMS for evaluation. He denies any loss of consciousness or head trauma. Patient is on blood thinners. He was complaining of left hip pain but no other symptoms.   Work-up in the emergency department significant for nondisplaced fracture of the greater trochanter. Patient was denying any need for pain medication on multiple reevaluations. CT head/cervical spine unremarkable for anything acute. No clinically significant lab abnormalities. Plan to admit the patient for further work-up and evaluation. Ortho consult pending. Jackie Garcia Spoke with Dr. Larisa Narayan who agreed to accept the patient for admission. ED Course as of 03/18/22 0006   u Mar 17, 2022   2000 Dr. Desai RUST type team Dr. Larisa Narayan. The patient. [PP]      ED Course User Index  [PP] Bebeto Isadora, DO        --------------------------------------------- PAST HISTORY ---------------------------------------------  Past Medical History:  has a past medical history of A-fib ([address] Anticoagulant long-term use, Atrial fibrillation ([address] BPH (benign prostatic hyperplasia), CAD (coronary artery disease), Cataract of left eye, Cerebrovascular disease, CHF (congestive heart failure) ([address] Diabetes mellitus ([address] Dislocated IOL (intraocular lens), posterior, Hearing loss, Hyperlipidemia, Hypertension, Osteoarthritis, Peripheral vascular disease ([address] Renal cyst, Right cataract, Type 2 diabetes mellitus with stage 3b chronic kidney disease, without long-term current use of insulin ([address] Type 2 diabetes mellitus without complication ([address] and Unspecified cerebral artery occlusion with cerebral infarction. Past Surgical History:  has a past surgical history that includes joint replacement; Cataract removal with implant (Right, 11 12 2013); eye surgery (Right, 11/15/13); and Cataract removal with implant (Left, 03/11/13). Social History:  reports that he quit smoking about 24 years ago. He has a 30.00 pack-year smoking history. He quit smokeless tobacco use about 6 years ago. He reports current alcohol use. He reports that he does not use drugs. Family History: family history includes Asthma in his mother; High Blood Pressure in his mother; Stroke in his mother.      The patients home medications have been reviewed.     Allergies: Lipitor [atorvastatin calcium] and Vytorin [ezetimibe-simvastatin]    -------------------------------------------------- RESULTS -------------------------------------------------    LABS:  Results for orders placed or performed during the hospital encounter of 03/17/22   Comprehensive Metabolic Panel w/ Reflex to MG   Result Value Ref Range    Sodium 137 132 - 146 mmol/L    Potassium reflex Magnesium 3.9 3.5 - 5.0 mmol/L    Chloride 101 98 - 107 mmol/L    CO2 26 22 - 29 mmol/L    Anion Gap 10 7 - 16 mmol/L    Glucose 77 74 - 99 mg/dL    BUN 22 6 - 23 mg/dL    CREATININE 1.2 0.7 - 1.2 mg/dL    GFR Non-African American 57 >=60 mL/min/1.73    GFR African American >60     Calcium 10.1 8.6 - 10.2 mg/dL    Total Protein 7.1 6.4 - 8.3 g/dL    Albumin 3.8 3.5 - 5.2 g/dL    Total Bilirubin 0.4 0.0 - 1.2 mg/dL    Alkaline Phosphatase 87 40 - 129 U/L    ALT 8 0 - 40 U/L    AST 29 0 - 39 U/L   CBC with Auto Differential   Result Value Ref Range    WBC 10.5 4.5 - 11.5 E9/L    RBC 4.21 3.80 - 5.80 E12/L    Hemoglobin 13.7 12.5 - 16.5 g/dL    Hematocrit 42.3 37.0 - 54.0 %    .5 (H) 80.0 - 99.9 fL    MCH 32.5 26.0 - 35.0 pg    MCHC 32.4 32.0 - 34.5 %    RDW 13.6 11.5 - 15.0 fL    Platelets 771 496 - 580 E9/L    MPV 10.6 7.0 - 12.0 fL    Neutrophils % 75.6 43.0 - 80.0 %    Immature Granulocytes % 1.0 0.0 - 5.0 %    Lymphocytes % 11.0 (L) 20.0 - 42.0 %    Monocytes % 9.4 2.0 - 12.0 %    Eosinophils % 2.2 0.0 - 6.0 %    Basophils % 0.8 0.0 - 2.0 %    Neutrophils Absolute 7.92 (H) 1.80 - 7.30 E9/L    Immature Granulocytes # 0.10 E9/L    Lymphocytes Absolute 1.15 (L) 1.50 - 4.00 E9/L    Monocytes Absolute 0.98 (H) 0.10 - 0.95 E9/L    Eosinophils Absolute 0.23 0.05 - 0.50 E9/L    Basophils Absolute 0.08 0.00 - 0.20 E9/L   Serum Drug Screen   Result Value Ref Range    Ethanol Lvl 23 mg/dL    Acetaminophen Level <5.0 (L) 10.0 - 05.5 mcg/mL    Salicylate, Serum <5.4 0.0 - 30.0 mg/dL    TCA Scrn NEGATIVE Cutoff:300 ng/mL   Protime-INR   Result Value Ref Range    Protime 12.7 (H) 9.3 - 12.4 sec    INR 1.1    APTT   Result Value Ref Range    aPTT 34.9 24.5 - 35.1 sec       RADIOLOGY:  CT HIP LEFT WO CONTRAST   Final Result   Nondisplaced periprosthetic fracture of the lateral proximal left   femur/greater trochanter as detailed above. Other chronic appearing findings with prominent degenerative changes. RECOMMENDATIONS:   Unavailable         XR HIP LEFT (2-3 VIEWS)   Final Result   1. Nondisplaced fracture through the greater trochanter which extends to the   superolateral surface of the femoral prosthetic component. The fracture   lucency is best appreciated on the frogleg lateral view. CT Head WO Contrast   Final Result   1. There is no acute intracranial abnormality. Specifically, there is no   intracranial hemorrhage. 2. Atrophy and periventricular leukomalacia,         CT Cervical Spine WO Contrast   Final Result   1. There is no acute compression fracture or subluxation of the cervical   spine. 2. Multilevel degenerative disc and degenerative joint disease.               ------------------------- NURSING NOTES AND VITALS REVIEWED ---------------------------  Date / Time Roomed:  3/17/2022  2:14 PM  ED Bed Assignment:  0322/0322-01    The nursing notes within the ED encounter and vital signs as below have been reviewed.      Patient Vitals for the past 24 hrs:   BP Temp Temp src Pulse Resp SpO2 Height Weight   03/17/22 2315 -- -- -- -- -- -- 5' 9\" (1.753 m) 177 lb (80.3 kg)   03/17/22 2230 104/62 98.3 °F (36.8 °C) Oral 72 16 97 % -- --   03/17/22 1430 (!) 97/55 98.2 °F (36.8 °C) Oral 60 18 96 % -- 177 lb (80.3 kg)       Oxygen Saturation Interpretation: Normal    ------------------------------------------ PROGRESS NOTES ------------------------------------------    Counseling:  I have spoken with the patient and discussed todays results, in addition to providing specific details for the plan of care and counseling regarding the diagnosis and prognosis. Their questions are answered at this time and they are agreeable with the plan of admission.    --------------------------------- ADDITIONAL PROVIDER NOTES ---------------------------------  Consultations:   Spoke with Dr. Eloise Richardson. Discussed case. They will admit the patient. This patient's ED course included: a personal history and physicial examination, re-evaluation prior to disposition and multiple bedside re-evaluations    This patient has remained hemodynamically stable during their ED course. Diagnosis:  1. Closed nondisplaced fracture of greater trochanter of left femur, initial encounter ([facility]        Disposition:  Patient's disposition: Admit to med/surg floor  Patient's condition is stable.              Enrike Sarmiento DO  Resident  03/18/22 7875

## 2022-03-18 PROCEDURE — 97165 OT EVAL LOW COMPLEX 30 MIN: CPT

## 2022-03-18 PROCEDURE — 97161 PT EVAL LOW COMPLEX 20 MIN: CPT | Performed by: PHYSICAL THERAPIST

## 2022-03-18 PROCEDURE — 97535 SELF CARE MNGMENT TRAINING: CPT

## 2022-03-18 PROCEDURE — 1200000000 HC SEMI PRIVATE

## 2022-03-18 PROCEDURE — 97112 NEUROMUSCULAR REEDUCATION: CPT | Performed by: PHYSICAL THERAPIST

## 2022-03-18 PROCEDURE — 97530 THERAPEUTIC ACTIVITIES: CPT

## 2022-03-18 PROCEDURE — 99222 1ST HOSP IP/OBS MODERATE 55: CPT | Performed by: FAMILY MEDICINE

## 2022-03-18 PROCEDURE — 6370000000 HC RX 637 (ALT 250 FOR IP): Performed by: FAMILY MEDICINE

## 2022-03-18 PROCEDURE — 51798 US URINE CAPACITY MEASURE: CPT

## 2022-03-18 PROCEDURE — 6370000000 HC RX 637 (ALT 250 FOR IP): Performed by: STUDENT IN AN ORGANIZED HEALTH CARE EDUCATION/TRAINING PROGRAM

## 2022-03-18 PROCEDURE — 97110 THERAPEUTIC EXERCISES: CPT

## 2022-03-18 RX ORDER — GLIPIZIDE 5 MG/1
2.5 TABLET ORAL
Status: DISCONTINUED | OUTPATIENT
Start: 2022-03-19 | End: 2022-03-21 | Stop reason: HOSPADM

## 2022-03-18 RX ORDER — RAMIPRIL 2.5 MG/1
2.5 CAPSULE ORAL DAILY
Status: DISCONTINUED | OUTPATIENT
Start: 2022-03-18 | End: 2022-03-21 | Stop reason: HOSPADM

## 2022-03-18 RX ADMIN — RAMIPRIL 2.5 MG: 2.5 CAPSULE ORAL at 18:15

## 2022-03-18 RX ADMIN — HYDROCODONE BITARTRATE AND ACETAMINOPHEN 2 TABLET: 5; 325 TABLET ORAL at 00:06

## 2022-03-18 RX ADMIN — APIXABAN 5 MG: 5 TABLET, FILM COATED ORAL at 12:58

## 2022-03-18 RX ADMIN — PRAVASTATIN SODIUM 40 MG: 20 TABLET ORAL at 20:17

## 2022-03-18 RX ADMIN — APIXABAN 5 MG: 5 TABLET, FILM COATED ORAL at 20:19

## 2022-03-18 RX ADMIN — BRIMONIDINE TARTRATE 1 DROP: 2 SOLUTION/ DROPS OPHTHALMIC at 09:28

## 2022-03-18 RX ADMIN — HYDROCODONE BITARTRATE AND ACETAMINOPHEN 2 TABLET: 5; 325 TABLET ORAL at 18:15

## 2022-03-18 RX ADMIN — HYDROCODONE BITARTRATE AND ACETAMINOPHEN 1 TABLET: 5; 325 TABLET ORAL at 12:58

## 2022-03-18 RX ADMIN — BRIMONIDINE TARTRATE 1 DROP: 2 SOLUTION/ DROPS OPHTHALMIC at 20:19

## 2022-03-18 ASSESSMENT — PAIN SCALES - GENERAL
PAINLEVEL_OUTOF10: 8
PAINLEVEL_OUTOF10: 10
PAINLEVEL_OUTOF10: 10
PAINLEVEL_OUTOF10: 4
PAINLEVEL_OUTOF10: 9
PAINLEVEL_OUTOF10: 0
PAINLEVEL_OUTOF10: 2
PAINLEVEL_OUTOF10: 2

## 2022-03-18 ASSESSMENT — PAIN DESCRIPTION - LOCATION: LOCATION: HIP

## 2022-03-18 ASSESSMENT — PAIN DESCRIPTION - PAIN TYPE: TYPE: ACUTE PAIN

## 2022-03-18 ASSESSMENT — PAIN DESCRIPTION - DESCRIPTORS: DESCRIPTORS: ACHING;DISCOMFORT;DULL

## 2022-03-18 ASSESSMENT — PAIN DESCRIPTION - ORIENTATION: ORIENTATION: LEFT

## 2022-03-18 ASSESSMENT — PAIN DESCRIPTION - FREQUENCY: FREQUENCY: CONTINUOUS

## 2022-03-18 ASSESSMENT — PAIN - FUNCTIONAL ASSESSMENT: PAIN_FUNCTIONAL_ASSESSMENT: PREVENTS OR INTERFERES SOME ACTIVE ACTIVITIES AND ADLS

## 2022-03-18 ASSESSMENT — PAIN DESCRIPTION - PROGRESSION: CLINICAL_PROGRESSION: GRADUALLY WORSENING

## 2022-03-18 ASSESSMENT — PAIN DESCRIPTION - ONSET: ONSET: ON-GOING

## 2022-03-18 NOTE — PLAN OF CARE
Problem: Falls - Risk of:  Goal: Will remain free from falls  Outcome: Met This Shift     Problem: Falls - Risk of:  Goal: Absence of physical injury  Outcome: Met This Shift     Problem: Pain:  Goal: Pain level will decrease  Outcome: Met This Shift  Note: Pain is less than 3 after being medicated     Problem: Pain:  Goal: Control of acute pain  Outcome: Met This Shift     Problem: Pain:  Goal: Control of chronic pain  Outcome: Met This Shift     Problem: Skin Integrity:  Goal: Will show no infection signs and symptoms  Outcome: Met This Shift  Note: No skin breakdown     Problem: Skin Integrity:  Goal: Will show no infection signs and symptoms  Outcome: Met This Shift  Note: No skin breakdown     Problem: Skin Integrity:  Goal: Absence of new skin breakdown  Outcome: Met This Shift

## 2022-03-18 NOTE — PROGRESS NOTES
OCCUPATIONAL THERAPY TREATMENT NOTE     [facility]  [facility] [place]. [place]         Date:3/18/2022  Patient Name: Milan Acosta  MRN: [id number]  : 1934  Room: [address] [place] [address]            Evaluating OT: Charmaine Taylor OTR/L; [id number]      Referring Provider and Specific Provider Orders/Date:      22   OT eval and treat  Start:  22 0800,   End:  22 08,   ONE TIME,   Standing Count:  1 Occurrences,   R         Caden Cowan, DO       Placement Recommendation: Subacute         Diagnosis:   1.  Closed nondisplaced fracture of greater trochanter of left femur, initial encounter [facility])         Surgery: none        Pertinent Medical History:       Past Medical History        Past Medical History:   Diagnosis Date    A-fib ([address]      Anticoagulant long-term use      Atrial fibrillation (HCC)      BPH (benign prostatic hyperplasia)      CAD (coronary artery disease)      Cataract of left eye 3/11/2014    Cerebrovascular disease      CHF (congestive heart failure) (HCC)      Diabetes mellitus ([address]      Dislocated IOL (intraocular lens), posterior 11/15/2013    Hearing loss      Hyperlipidemia      Hypertension      Osteoarthritis      Peripheral vascular disease ([address]      Renal cyst      Right cataract 2013    Type 2 diabetes mellitus with stage 3b chronic kidney disease, without long-term current use of insulin ([address] 2022    Type 2 diabetes mellitus without complication (HCC)      Unspecified cerebral artery occlusion with cerebral infarction 10/2010     mild stroke            Past Surgical History         Past Surgical History:   Procedure Laterality Date    CATARACT REMOVAL WITH IMPLANT Right 2013    CATARACT REMOVAL WITH IMPLANT Left 13    EYE SURGERY Right 11/15/13     dislocated intraocular lens    JOINT REPLACEMENT         left hip          Precautions:  Fall Risk, TTWB: L LE, poor adherence to TTWB, impulsive, [place], R LE is swollen compared to L LE     CT HIP LEFT WO CONTRAST 3/17/22  Nondisplaced periprosthetic fracture of the lateral proximal left femur/greater trochanter as detailed above. Other chronic appearing findings with prominent degenerative changes. FINDINGS:   Streak artifact due to a left hip prosthesis.  As seen on the plain radiographs, there is a nondisplaced periprosthetic fracture of the proximal left femur laterally in the region of the greater trochanter.  1 of the fracture lines is located transversely at the base of the trochanter.  Other fracture line extends slightly obliquely into the lateral margin of the intertrochanteric region through the lateral cortex.       Assessment of current deficits:     [x]? Functional mobility            [x]?ADLs           [x]? Strength                   []?Cognition    [x]? Functional transfers          [x]? IADLs         [x]? Safety Awareness   [x]? Endurance    []? Fine Coordination              [x]? Balance      []? Vision/perception    []? Sensation      []? Gross Motor Coordination  []? ROM           []?  Delirium                   []? Motor Control      OT PLAN OF CARE   OT POC based on physician orders, patient diagnosis and results of clinical assessment     Frequency/Duration 1-3 days/wk for 2 weeks PRN      Specific OT Treatment Interventions to include:   * Instruction/training on adapted ADL techniques and AE recommendations to increase functional independence within precautions       * Training on energy conservation strategies, correct breathing pattern and techniques to improve independence/tolerance for self-care routine  * Functional transfer/mobility training/DME recommendations for increased independence, safety, and fall prevention  * Patient/Family education to increase follow through with safety techniques and functional independence  * Recommendation of environmental modifications for increased safety with functional transfers/mobility and ADLs  * Splinting/positioning for increased function, prevention of contractures, and improve skin integrity  * Therapeutic exercise to improve motor endurance, ROM, and functional strength for ADLs/functional transfers  * Therapeutic activities to facilitate/challenge dynamic balance, stand tolerance for increased safety and independence with ADLs  * Positioning to improve skin integrity, interaction with environment and functional independence     Recommended Adaptive Equipment: TBD at rehab       Home Living: alone; single family home, 1 story, 3 steps to enter with B rails, walk in shower.        Equipment owned: wheeled walker, straight cane, shower chair, grab bars, walking stock     Prior Level of Function: Independent with ADLs , Independent with IADLs; ambulated with no device inside his home, uses a straight cane for community distances     Driving: yes   Occupation: retired from the [employer]  Interest: gardening and his dog Erin     Pain Level: 6/10 pain in L hip; Nursing notified.             Cognition: A&O: 4/4; Follows 3 step directions              Memory: good               Sequencing: fair minus              Problem solving: fair minus              Judgement/safety: fair minus      [facility]   AM-PAC Daily Activity Inpatient   How much help for putting on and taking off regular lower body clothing?: Total  How much help for Bathing?: A Lot  How much help for Toileting?: Total  How much help for putting on and taking off regular upper body clothing?: A Little  How much help for taking care of personal grooming?: A Little  How much help for eating meals?: None  AM-[facility] Inpatient Daily Activity Raw Score: 14  AM-PAC Inpatient ADL T-Scale Score : 33.39  ADL Inpatient CMS 0-100% Score: 59.67  ADL Inpatient CMS G-Code Modifier : CK                Functional Assessment:     Initial Eval Status  Date: 3/18/22 Treatment Status  Date:  STGs = LTGs  Time frame: 10-14 days Feeding Independent  Independent   Independent    Grooming Supervision with set up Supervision seated EOB  Independent    UB Dressing Supervision with set up n/t Independent    LB Dressing Dependent to don socks   MAX A to fredy pants requiring assist to thread pants onto B LE, pt attempted to stand to pull up around waist but was not able to follow weight bearing precautions; pt then returning to supine rolling side/side to pull up around waist v/c's for technique  Minimal Assist    Bathing Maximal Assist  n/t Minimal Assist    Toileting Dependent for hygiene after using bedside commode n/t Minimal Assist    Bed Mobility  Supine to sit: Moderate Assist   Sit to supine: N/T as pt was up in chair  Supine<>sit MOD A pt requiring assist to bring B LE in/out of bed v/c's for technique  Supine to sit: Independent   Sit to supine: Independent    Functional Transfers Maximal assist from EOB to wheeled walker. Moderate Assist for transfer to and from bedside commode. Moderate Assist for transfer from standing to bedside chair. Transfer training with verbal cues for hand placement throughout session to improve safety. Pt attempting to complete sit<>stand from EOB however pt not following weight bearing precautions for L LE   Independent    Functional Mobility Maximal assist with wheeled walker to improve balance from edge of bed to bedside commode, Maximal verbal cues for walker sequence and safety. Assistance for walker negotiation. Poor adherence to TTWB: L LE despite maximal verbal and tactile cues. n/t Minimal Assist    Balance Sitting:     Static: fair minus    Dynamic: poor  Standing: fair minus with wheeled walker while bedside commode was switched out to bedside chair.   Sitting:   Static: SBA  Dynamic: MIN A   Standing: poor unable to follow weight bearing restrictions to complete standing       Activity Tolerance Fair minus    good    Visual/  Perceptual Glasses: yes                         Comments: Upon arrival pt supine in bed, agreeable to therapy session. Pt educated with regards to bed mobility, functional transfers, weight bearing precautions, hand placement, static/dynamic sitting balance, LE dressing. At end of session pt supine in bed,  all lines and tubes intact, call light within reach. · Pt has made fair  progress towards set goals.    · Continue with current plan of care      Treatment Time In:1415            Treatment Time Out: 1440                Treatment Charges: Mins Units   Ther Ex  [id number]     Manual Therapy [id number] [facility]     Thera Activities [id number] 15 1   ADL/Home Mgt [id number]    Neuro Re-ed [id number]     Group Therapy      Orthotic manage/training  [id number]     Non-Billable Time     Total Timed Treatment 25 [id number] [facility] [address]

## 2022-03-18 NOTE — CONSULTS
Department of Orthopedic Surgery  Resident Consult Note          Reason for Consult:  L hip pain after a fall    HISTORY OF PRESENT ILLNESS:       Patient is a 80 y.o. male with past medical history of hypertension, paroxysmal A. fib, hyperlipidemia, pacemaker, type 2 diabetes who presents with the chief complaint of left hip pain after a fall in his driveway earlier today. Patient states that he was out to dinner for his birthday with family and friends when he came home and lost his balance and fell onto his left hip in the driveway. Patient states that he had immediate pain and difficulty bearing weight to the left lower extremity. He states that he had his left hip replaced approximately 10 years ago by Dr. Davide Draper. He states has been feeling well ever since. He states that he currently ambulates with a cane, he adds that he often carries and does not use it appropriately. He states he is able to get around his house without any assistive devices. States he currently lives alone however after he returns home from the hospital he plans for his daughter and sister to stay with him. He states that most of his pain is on the lateral aspect of the proximal thigh. Overlying the greater trochanter. Denies any radiation distally. Denies numbness/tingling/paresthesias. Denies any other orthopedic complaints at this time.       Past Medical History:        Diagnosis Date    A-fib [facility])     Anticoagulant long-term use     Atrial fibrillation (HCC)     BPH (benign prostatic hyperplasia)     CAD (coronary artery disease)     Cataract of left eye 3/11/2014    Cerebrovascular disease     CHF (congestive heart failure) (HCC)     Diabetes mellitus ([address]     Dislocated IOL (intraocular lens), posterior 11/15/2013    Hearing loss     Hyperlipidemia     Hypertension     Osteoarthritis     Peripheral vascular disease ([address]     Renal cyst     Right cataract 11/12/2013    Type 2 diabetes mellitus with stage 3b chronic kidney disease, without long-term current use of insulin ([facility] 2/22/2022    Type 2 diabetes mellitus without complication [facility]     Unspecified cerebral artery occlusion with cerebral infarction 10/2010    mild stroke     Past Surgical History:        Procedure Laterality Date    CATARACT REMOVAL WITH IMPLANT Right 11 12 2013    CATARACT REMOVAL WITH IMPLANT Left 03/11/13    EYE SURGERY Right 11/15/13    dislocated intraocular lens    JOINT REPLACEMENT      left hip     Current Medications:   Current Facility-Administered Medications: pravastatin (PRAVACHOL) tablet 40 mg, 40 mg, Oral, Nightly  brimonidine (ALPHAGAN) 0.2 % ophthalmic solution 1 drop, 1 drop, Right Eye, BID  HYDROcodone-acetaminophen (NORCO) 5-325 MG per tablet 1 tablet, 1 tablet, Oral, Q4H PRN **OR** HYDROcodone-acetaminophen (NORCO) 5-325 MG per tablet 2 tablet, 2 tablet, Oral, Q4H PRN  Allergies:  Lipitor [atorvastatin calcium] and Vytorin [ezetimibe-simvastatin]    Social History:   TOBACCO:   reports that he quit smoking about 24 years ago. He has a 30.00 pack-year smoking history. He quit smokeless tobacco use about 6 years ago. ETOH:   reports current alcohol use. DRUGS:   reports no history of drug use.   ACTIVITIES OF DAILY LIVING:    OCCUPATION: Retired  Family History:       Problem Relation Age of Onset    Asthma Mother     High Blood Pressure Mother     Stroke Mother        REVIEW OF SYSTEMS:  CONSTITUTIONAL:  negative for  fevers, chills  EYES:  negative for acute vision changes  HEENT:  negative for cough, positive for hearing loss  RESPIRATORY:  negative for  dyspnea, wheezing  CARDIOVASCULAR:  negative for  chest pain  GASTROINTESTINAL:  negative for nausea, vomiting  GENITOURINARY:  negative for frequency, urinary incontinence  HEMATOLOGIC/LYMPHATIC:  negative for bleeding  MUSCULOSKELETAL:  positive for left hip pain  NEUROLOGICAL:  negative for headaches, dizziness  BEHAVIOR/PSYCH:  negative for increased agitation and anxiety    PHYSICAL EXAM:    VITALS:  /62   Pulse 72   Temp 98.3 °F (36.8 °C) (Oral)   Resp 16   Ht 5' 9\" (1.753 m)   Wt 177 lb (80.3 kg)   SpO2 97%   BMI 26.14 kg/m²   CONSTITUTIONAL:  awake, alert, cooperative, no apparent distress, and appears stated age  MUSCULOSKELETAL:  Left lower Extremity:  · Skin examination reveals no superficial lacerations or abrasions about the hip. There is a small superficial abrasion over the anterior knee. Does not extend past the dermis. No appreciable ecchymosis or erythema at this time. · TTP over the lateral aspect of the hip. No tenderness palpation over the thigh, knee, shin, ankle foot, toes  · Sensation to light touch distally in sural, saphenous, tibial, deep and superficial peroneal nerve distribution  · Motor function grossly intact distally in tibial, deep and superficial peroneal nerve distributions  · Calf supple nontender  · Compartment soft and compressible  · +2/4 PT and DP pulses, foot warm well perfused    Secondary Exam:   · bilateralUE: Small skin tear over the lateral aspect left elbow otherwise no obvious signs of trauma. -TTP to fingers, hand, wrist, forearm, elbow, humerus, shoulder or clavicle. · rightLE: No obvious signs of trauma. -TTP to foot, ankle, leg, knee, thigh, hip. · Pelvis: -TTP, -Log roll, -Heel strike     DATA:    CBC:   Lab Results   Component Value Date    WBC 10.5 03/17/2022    RBC 4.21 03/17/2022    HGB 13.7 03/17/2022    HCT 42.3 03/17/2022    .5 03/17/2022    MCH 32.5 03/17/2022    MCHC 32.4 03/17/2022    RDW 13.6 03/17/2022     03/17/2022    MPV 10.6 03/17/2022     PT/INR:    Lab Results   Component Value Date    PROTIME 12.7 03/17/2022    PROTIME 12.3 09/15/2020    INR 1.1 03/17/2022       Radiology Review:  XR AP pelvis and 2 views left hip reviewed demonstrating a nondisplaced periprosthetic fracture about the greater trochanter of the left hip.   No evidence of subsidence or implant failure. No other acute fracture dislocations noted. Osteopenia and atherosclerosis noted. No other bony or soft tissue abnormalities noted. CT scan of the left hip reviewed demonstrating the fracture pattern as above. There is no distal extension around the stem. The stem still appears to be well-seated without any evidence of subsidence. Severe atherosclerosis noted. Osteopenia noted. No other bony or soft tissue abnormalities appreciated. IMPRESSION:  · Closed, nondisplaced periprosthetic greater trochanteric femur fracture    PLAN:  · Toe-touch weightbearing left lower extremity  · Pain control per primary  · Antibiotics as indicated by primary  · DVT Prophylaxis per primary, up as tolerated while using a walker  · PT/OT eval and treat  · No acute orthopedic intervention planned at this time. Discussed with patient that treatment will involve physical therapy as well as gait training. Explained the patient that there is a small chance that he might need operative fixation in the future however this is not anticipated. Patient expressed understanding  · Follow up with Dr. Maryan Andres in 1 to 2 weeks  · DC planning  · All patient/family questions have been answered and patient is currently agreeable to plan.   · Discuss with Attending      Electronically signed by Marina Amato DO on 3/17/2022 at 11:19 PM

## 2022-03-18 NOTE — PROGRESS NOTES
Physical Therapy    Physical Therapy Initial Evaluation/Plan of Care    Room #:  0332/0173-58  Patient Name: Glo Couch  YOB: 1934  MRN: [id number]    Date of Service: 3/18/2022     Tentative placement recommendation: subacute  Equipment recommendation: Equipment at Nursing Home      Evaluating Physical Therapist: Jesus Raymond PT  #[id number]     Specific Provider Orders/Date/Referring Provider :  03/18/22 0800   PT eval and treat Start: 03/18/22 0800, End: 03/18/22 0800, ONE TIME, Standing Count: 1 Occurrences, R    Michaela Shields DO      Admitting Diagnosis:   Hip fracture requiring operative repair, left, closed, initial encounter ([facility] Massie Harada   Visit diagnosis:   Visit Diagnoses       Codes    Closed nondisplaced fracture of greater trochanter of left femur, initial encounter ([facility]    -  Primary S72.115A         pt fell and sustained  L non-displaced periprosthetic greater trochanteric femur fracture  Per dr Tamia Rodriguez: fracture of the greater trochanter which does not extend beyond the base of the trochanter which is even with the top of the femoral stem which appears to be well fixed throughout its length. There is no compromise of the acetabulum.   Conservative trt with Toe touch weight bearing       Patient Active Problem List   Diagnosis    Essential hypertension    Inflammatory arthritis    [facility]ox[facility]    Mixed hyperlipidemia    Pacemaker    Type 2 diabetes mellitus with stage 3b chronic kidney disease, without long-term current use of insulin (HCC)    Ichthyosis    Hip fracture requiring operative repair, left, closed, initial encounter [facility]       ASSESSMENT of current deficits: Patient exhibits decreased strength, balance, endurance, range of motion, coordination and pain left lower extremity with movement impairing functional mobility, transfers, gait  and tolerance to activity are barriers to d/c and require skilled intervention during hospital stay to attain pre hospital level of function. Decreased strength, balance and endurance  increases patient's risk for fall. Pt with difficulty maintaining Toe touch weight bearing and implementing restrictions during trf/bed mobility. Extensive education provided re: healing and need for Toe touch weight bearing to avoid disruption of non displaced fracture site. States understanding however requires max a of two to implement in standing       PHYSICAL THERAPY  PLAN OF CARE       Physical therapy plan of care is established based on physician order,  patient diagnosis and clinical assessment    Current Treatment Recommendations:    -Bed Mobility: Lower extremity exercises , Upper extremity exercises  and Trunk control activities   -Standing Balance: Perform sit to stand activities maintaining TTWB (toe-touch weight bearing) weightbearing left lower extremity   -Transfers: Cues for hand placement, technique and safety. Provide stabilization to prevent fall  and Provide instruction on proper hand and left lower extremity placement to maintain NWB (non-weight bearing) weightbearing left lower extremity   -Gait: Gait training, Exercises to improve hip and knee control, Performance of protected weight bearing activities and Pregait training to emphasize: Sequencing , Device approximation and NWB (non-weight bearing)   -Endurance: Utilize Supervised activities to increase level of endurance to allow for safe functional mobility including transfers and gait     PT long term treatment goals are located in below grid    Patient and or family understand(s) diagnosis, prognosis, and plan of care. Frequency of treatments: Patient will be seen  twice daily  for therapeutic exercise, functional retraining, endurance activities, balance exercises, family and patient education.        Prior Level of Function: Patient ambulated independently    Rehab Potential: good    for baseline post healing phase    Past medical history:   Past Medical History:   Diagnosis Date    [facility]     Anticoagulant long-term use     Atrial fibrillation (HCC)     BPH (benign prostatic hyperplasia)     CAD (coronary artery disease)     Cataract of left eye 3/11/2014    Cerebrovascular disease     CHF (congestive heart failure) (HCC)     Diabetes mellitus ([address]     Dislocated IOL (intraocular lens), posterior 11/15/2013    Hearing loss     Hyperlipidemia     Hypertension     Osteoarthritis     Peripheral vascular disease ([facility]     Renal cyst     Right cataract 11/12/2013    Type 2 diabetes mellitus with stage 3b chronic kidney disease, without long-term current use of insulin ([facility] 2/22/2022    Type 2 diabetes mellitus without complication ([facility]     Unspecified cerebral artery occlusion with cerebral infarction 10/2010    mild stroke     Past Surgical History:   Procedure Laterality Date    CATARACT REMOVAL WITH IMPLANT Right 11 12 2013    CATARACT REMOVAL WITH IMPLANT Left 03/11/13    EYE SURGERY Right 11/15/13    dislocated intraocular lens    JOINT REPLACEMENT      left hip         SUBJECTIVE:    Precautions:  , falls and alarm ,left lower extremity TTWB (toe-touch weight bearing)     Social history: Patient lives with sister   with 3 steps  to enter with [facility] owned: ,  [address] [employer] Inpatient   How much difficulty turning over in bed?: A Little  How much difficulty sitting down on / standing up from a chair with arms?: A Lot  How much difficulty moving from lying on back to sitting on side of bed?: A Lot  How much help from another person moving to and from a bed to a chair?: A Lot  How much help from another person needed to walk in hospital room?: A Lot  How much help from another person for climbing 3-5 steps with a railing?: Total  AM-PAC Inpatient Mobility Raw Score : 12  AM-PAC Inpatient T-Scale Score : 35.33  Mobility Inpatient CMS 0-100% Score: 68.66  Mobility Inpatient CMS G-Code safety and plan of care,  importance of mobility while in hospital , ankle pumps, quad set and glut set for edema control, blood clot prevention, safety  and weight bearing status       Patient response to education:   Pt verbalized understanding Pt demonstrated skill Pt requires further education in this area   Yes Partial Yes       Treatment:  Patient practiced and was instructed in the following treatment:     Therapist educated and facilitated patient on techniques to increase safety and independence with bed mobility, balance, functional transfers, and functional mobility. Instruction wt bearing         Patient performed ankle pumps, quad sets, glut sets x 15-20 each. Sat edge of bed 10 minutes with Supervision  to increase dynamic sitting balance and activity tolerance. At end of session, patient in bed in chair position with  call light and phone within reach,   all lines and tubes intact, nursing notified. Patient would benefit from continued skilled Physical Therapy to improve functional independence and quality of life. Patient's/ family goals   home      Patient and or family understand(s) diagnosis, prognosis, and plan of care. Frequency of treatments: Patient will be seen  twice daily  for therapeutic exercise, functional retraining, endurance activities, balance exercises, family and patient education. Time in  929  Time out  959    Total Treatment Time  10 minutes    Evaluation time includes thorough review of current medical information, gathering information on past medical history/social history and prior level of function, completion of standardized testing/informal observation of tasks, assessment of data, and development of Plan of care and goals.      CPT codes:  Low Complexity PT evaluation (66376)  Neuromuscular reeducation (25828)   10 minutes  1 unit(s)    Rafaela Burton PT

## 2022-03-18 NOTE — DISCHARGE INSTR - COC
Continuity of Care Form    Patient Name: Cheryl Kilpatrick   :  1934  MRN:  [id number]    Admit date:  3/17/2022  Discharge date:  3/21/2022    Code Status Order: Full Code   Advance Directives:      Admitting Physician:  Everton Moore MD  PCP: LAURENCE Cuellar CNP    Discharging Nurse: [facility] Unit/Room#: 1772/8192-13  Discharging Unit Phone Number: [phone]    Emergency Contact:   Extended Emergency Contact Information  Primary Emergency Contact: James Campbell [address] Phone: [phone]  Mobile Phone: [phone]  Relation: Child   needed? No  Secondary Emergency Contact: Contrerasadrianne Ingram [facility] [employer] [facility] Phone: [phone]  Mobile Phone: [phone]  Relation: Child   needed? No    Past Surgical History:  Past Surgical History:   Procedure Laterality Date    CATARACT REMOVAL WITH IMPLANT Right 2013    CATARACT REMOVAL WITH IMPLANT Left 13    EYE SURGERY Right 11/15/13    dislocated intraocular lens    JOINT REPLACEMENT      left hip       Immunization History: There is no immunization history on file for this patient.     Active Problems:  Patient Active Problem List   Diagnosis Code    Essential hypertension I10    Inflammatory arthritis M19.90    Paroxysmal A-fib (HCC) I48.0    Mixed hyperlipidemia E78.2    Pacemaker Z95.0    Type 2 diabetes mellitus with stage 3b chronic kidney disease, without long-term current use of insulin ([facility] E11.22, N18.32    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    Hip fracture requiring operative repair, left, closed, initial encounter ([facility] S72.002A       Isolation/Infection:   Isolation            No Isolation          Patient Infection Status       None to display            Nurse Assessment:  Last Vital Signs: BP (!) 163/77   Pulse 62   Temp 98 °F (36.7 °C)   Resp 16   Ht 5' 9\" (1.753 m)   Wt 177 lb (80.3 kg)   SpO2 97%   BMI 26.14 kg/m²     Last documented pain score (0-10 scale): Pain Level: 0  Last Weight:   Wt Readings from Last 1 Encounters:   03/17/22 177 lb (80.3 kg)     Mental Status:  oriented and alert    IV Access:  - None    Nursing Mobility/ADLs:  Walking   Assisted  Transfer  Assisted  Bathing  Assisted  Dressing  Assisted  Toileting  Assisted  Feeding  Independent  Med Admin  Assisted  Med Delivery   whole    Wound Care Documentation and Therapy:  Incision 03/28/17 Chest Left (Active)   Number of days: 8149        Elimination:  Continence: Bowel: Yes  Bladder: Yes  Urinary Catheter: None   Colostomy/Ileostomy/Ileal Conduit: No       Date of Last BM: ***    Intake/Output Summary (Last 24 hours) at 3/18/2022 1237  Last data filed at 3/18/2022 1111  Gross per 24 hour   Intake --   Output 1700 ml   Net -1700 ml     I/O last 3 completed shifts:  In: -   Out: 1100 [Urine:1100]    Safety Concerns:     History of Falls (last 30 days)    Impairments/Disabilities:      Vision    Nutrition Therapy:  Current Nutrition Therapy:   - Oral Diet:  General    Routes of Feeding: Oral  Liquids: Thin Liquids  Daily Fluid Restriction: no  Last Modified Barium Swallow with Video (Video Swallowing Test): not done    Treatments at the Time of Hospital Discharge:   Respiratory Treatments: ***  Oxygen Therapy:  is not on home oxygen therapy.   Ventilator:    - No ventilator support    Rehab Therapies: Physical Therapy and Occupational Therapy  Weight Bearing Status/Restrictions: Touchdown weight bearing (10-25 lbs) only on left leg  Other Medical Equipment (for information only, NOT a DME order):  walker  Other Treatments: ***    Patient's personal belongings (please select all that are sent with patient):  Glasses    RN SIGNATURE:  Electronically signed by Armin Resendiz RN on 3/21/22 at 11:30 AM EDT    CASE MANAGEMENT/SOCIAL WORK SECTION    Inpatient Status Date: 3/17/2022    Readmission Risk Assessment Score:  Readmission Risk              Risk of Unplanned Readmission:  6           Discharging to Facility/ Agency   Name: [facility]la Cooper  Address:  Phone: [phone]  Fax: [phone]    Dialysis Facility (if applicable)   Name:  Address:  Dialysis Schedule:  Phone:  Fax:    / signature: Electronically signed by PATY Herr on 3/18/22 at 12:38 PM EDT    PHYSICIAN SECTION    Prognosis: Good    Condition at Discharge: Stable    Rehab Potential (if transferring to Rehab): Good    Recommended Labs or Other Treatments After Discharge: ***    Physician Certification: I certify the above information and transfer of Armani Hoyos  is necessary for the continuing treatment of the diagnosis listed and that he requires [facility] for less 30 days.      Update Admission H&P: {CHP DME Changes in URHZB:[id number]}    PHYSICIAN SIGNATURE:  Electronically signed by Heather Hamm MD on 3/21/22 at 11:15 AM EDT

## 2022-03-18 NOTE — PROGRESS NOTES
hospital level of function. Decreased strength, balance and endurance  increases patient's risk for fall. Pt with difficulty maintaining Toe touch weight bearing in standing and hopping. Pt able to perform a heel/toe shuffle on RLE while adhering to TTWB on LLE. Pt with AROM on RLE and PROM/AAROM on LLE for exercises. PHYSICAL THERAPY  PLAN OF CARE       Physical therapy plan of care is established based on physician order,  patient diagnosis and clinical assessment    Current Treatment Recommendations:    -Bed Mobility: Lower extremity exercises , Upper extremity exercises  and Trunk control activities   -Standing Balance: Perform sit to stand activities maintaining TTWB (toe-touch weight bearing) weightbearing left lower extremity   -Transfers: Cues for hand placement, technique and safety. Provide stabilization to prevent fall  and Provide instruction on proper hand and left lower extremity placement to maintain NWB (non-weight bearing) weightbearing left lower extremity   -Gait: Gait training, Exercises to improve hip and knee control, Performance of protected weight bearing activities and Pregait training to emphasize: Sequencing , Device approximation and NWB (non-weight bearing)   -Endurance: Utilize Supervised activities to increase level of endurance to allow for safe functional mobility including transfers and gait     PT long term treatment goals are located in below grid    Patient and or family understand(s) diagnosis, prognosis, and plan of care. Frequency of treatments: Patient will be seen  twice daily  for therapeutic exercise, functional retraining, endurance activities, balance exercises, family and patient education.        Prior Level of Function: Patient ambulated independently    Rehab Potential: good    for baseline post healing phase    Past medical history:   Past Medical History:   Diagnosis Date    A-fib ([address]     Anticoagulant long-term use     Atrial fibrillation ([address]     BPH (benign prostatic hyperplasia)     CAD (coronary artery disease)     Cataract of left eye 3/11/2014    Cerebrovascular disease     CHF (congestive heart failure) (HCC)     Diabetes mellitus ([address]     Dislocated IOL (intraocular lens), posterior 11/15/2013    Hearing loss     Hyperlipidemia     Hypertension     Osteoarthritis     Peripheral vascular disease ([address]     Renal cyst     Right cataract 11/12/2013    Type 2 diabetes mellitus with stage 3b chronic kidney disease, without long-term current use of insulin ([address] 2/22/2022    Type 2 diabetes mellitus without complication (HCC)     Unspecified cerebral artery occlusion with cerebral infarction 10/2010    mild stroke     Past Surgical History:   Procedure Laterality Date    CATARACT REMOVAL WITH IMPLANT Right 11 12 2013    CATARACT REMOVAL WITH IMPLANT Left 03/11/13    EYE SURGERY Right 11/15/13    dislocated intraocular lens    JOINT REPLACEMENT      left hip         SUBJECTIVE:    Precautions:  , falls and alarm ,left lower extremity TTWB (toe-touch weight bearing)     Social history: Patient lives with sister   with 3 steps  to enter with [address]: ,  [address] Mobility Inpatient   How much difficulty turning over in bed?: A Little  How much difficulty sitting down on / standing up from a chair with arms?: A Lot  How much difficulty moving from lying on back to sitting on side of bed?: A Lot  How much help from another person moving to and from a bed to a chair?: A Lot  How much help from another person needed to walk in hospital room?: A Lot  How much help from another person for climbing 3-5 steps with a railing?: Total  AM-PAC Inpatient Mobility Raw Score : 12  AM-PAC Inpatient T-Scale Score : 35.33  Mobility Inpatient CMS 0-100% Score: 68.66  Mobility Inpatient CMS G-Code Modifier : CL    Nursing cleared patient for PT treatment.  .     OBJECTIVE:   Initial Evaluation  Date: 3/18/2022 Treatment Date:  3/18/2022   Short Term/ Long Term   Goals   Was pt agreeable to Eval/treatment? Yes yes    Pain Level  3/10   Left hip   6/10  Left hip     Bed Mobility  Rolling: Minimal assist of 1    Supine to sit: Moderate assist of 1    Sit to supine: Moderate assist of 1    Scooting: Moderate assist of 1   Rolling: Not assessed    Supine to sit: Not assessed    Sit to supine: Moderate assist of 1   Scooting: Not assessed     Rolling: Independent    Supine to sit: Independent    Sit to supine: Independent    Scooting: Independent     Transfers Sit to stand: Moderate assist of  2 to maintain Toe touch weight bearing  Sit to stand:  Moderate assist of 1 Cues for hand placement,TTWB on LLE, and safety       Sit to stand: Modified Independent     Ambulation    2 steps using  wheeled walker with Moderate assist of  2   for walker approximation, balance and toe touch weight bearing left lower extremity and cues for sequencing, TTWB (toe-touch weight bearing) weight bearing left lower extremity, upright posture, walker approximation, safety and proper hand placement   Heel toe x 2 steps to head of bed mod a of 2  2 hops back towards the bed; 3 heel toe steps using  wheeled walker with Moderate assist of 1   cues for TTWB (toe-touch weight bearing) weight bearing left lower extremity, upright posture and safety   50 feet using walker with Modified Independent    Stair negotiation: ascended and descended   Not assessed       3 steps with rail and crutch Toe touch weight bearing min a   ROM Within functional limits except left lower extremity impaired 40 deg flexion    Increase range of motion 10% of affected joints    Strength Within functional limits  except  left lower extremity  2/5  Within functional limits   Balance Sitting EOB:  fair     Dynamic Standing:  poor  wheeled walker  Sitting EOB: fair   Dynamic Standing: poor wheeled walker   Sitting EOB:  good    Dynamic Standing:  good  walker      Patient is Alert & Oriented x person, place, time and situation and follows one step directions    Sensation:  Patient denies numbness/tingling  Edema:  yes right lower extremity chronic       Patient education  Patient educated on role of Physical Therapy, risks of immobility, safety and plan of care,  importance of mobility while in hospital , ankle pumps, quad set and glut set for edema control, blood clot prevention, safety  and weight bearing status       Patient response to education:   Pt verbalized understanding Pt demonstrated skill Pt requires further education in this area   Yes Partial Yes       Treatment:  Patient practiced and was instructed in the following treatment:     Therapist educated and facilitated patient on techniques to increase safety and independence with bed mobility, balance, functional transfers, and functional mobility. Patient sitting on the [facility] at start of tx session. Assisted pt with standing, hopped 2 steps back to the bed, performed a heel/toe shuffle towards HOB, and sat on the edge of the bed for 5 minutes. Pt returned to supine and performed supine exercises. Patient performed ankle pumps, quad sets, glut sets, hip abd/add, heel slides, SLR, x 15-20 each. At end of session, patient in bed with  call light and phone within reach,   all lines and tubes intact, nursing notified. Patient would benefit from continued skilled Physical Therapy to improve functional independence and quality of life. Patient's/ family goals   home      Patient and or family understand(s) diagnosis, prognosis, and plan of care. Frequency of treatments: Patient will be seen  twice daily  for therapeutic exercise, functional retraining, endurance activities, balance exercises, family and patient education.      Time in 13:35  Time out 13:58    Total Treatment Time  23 minutes        CPT codes:    Therapeutic activities (63257)   9 minutes  1 unit(s)  Therapeutic exercises (40464)   14 minutes  1 unit(s)    Marie Freeman.  [facility]  LIC # [id number]

## 2022-03-18 NOTE — H&P
History and Physical    Patient:  Jason Crowder  MRN: [id number]    CHIEF COMPLAINT:  H/O FALL IN DRIVE WAY AND FRACTURED HIP. History Obtained From:  patient, electronic medical record  PCP: LAURENCE Ramirez CNP    HISTORY OF PRESENT ILLNESS:   Patient is a 27-year-old male with past medical history of hypertension, A. fib on Eliquis, hyperlipidemia, diabetes and CAD presents to the emergency department due to unwitnessed mechanical fall at home. Patient apparently was having several beers and was walking in the driveway when he felt uneasy on his feet and fell from the ground level. Patient states that he hurt his left hip during the fall but denies any head trauma or loss consciousness. Patient is on blood thinners. Other than pain in his left hip, patient is denying any pain anywhere else. He has no obvious signs of external injuries or deformities to the head, face, neck, chest, trunk or extremities. Patient otherwise denying any prodromal symptoms before his fall.   He also states he has no chest pain, shortness of breath, nausea, vomiting, fever, chills, lightheadedness, headache, vision changes, numbness, tingling, ataxia, urinary symptoms, constipation or diarrhea.       Past Medical History:        Diagnosis Date    A-fib ([address]     Anticoagulant long-term use     Atrial fibrillation (HCC)     BPH (benign prostatic hyperplasia)     CAD (coronary artery disease)     Cataract of left eye 3/11/2014    Cerebrovascular disease     CHF (congestive heart failure) ([address]     Diabetes mellitus ([address]     Dislocated IOL (intraocular lens), posterior 11/15/2013    Hearing loss     Hyperlipidemia     Hypertension     Osteoarthritis     Peripheral vascular disease ([address]     Renal cyst     Right cataract 11/12/2013    Type 2 diabetes mellitus with stage 3b chronic kidney disease, without long-term current use of insulin ([address] 2/22/2022    Type 2 [facility] complication [facility]     Unspecified cerebral artery occlusion with cerebral infarction 10/2010    mild stroke       Past Surgical History:        Procedure Laterality Date    CATARACT REMOVAL WITH IMPLANT Right 11 12 2013    CATARACT REMOVAL WITH IMPLANT Left 03/11/13    EYE SURGERY Right 11/15/13    dislocated intraocular lens    JOINT REPLACEMENT      left hip       Medications Prior to Admission:    Prior to Admission medications    Medication Sig Start Date End Date Taking? Authorizing Provider   pravastatin (PRAVACHOL) 40 MG tablet Take 1 tablet by mouth at bedtime Note increase in dose 2/23/22   Otis Letters, APRN - CNP   ramipril (ALTACE) 2.5 MG capsule Take 1 capsule by mouth daily 2/23/22 5/24/22  Otis Letters, APRN - CNP   apixaban (ELIQUIS) 5 MG TABS tablet Take 1 tablet by mouth 2 times daily 2/23/22   Otis Letters, APRN - CNP   furosemide (LASIX) 20 MG tablet Take 1 tablet by mouth daily 2/23/22   Otis Letters, APRN - CNP   glipiZIDE (GLUCOTROL XL) 2.5 MG extended release tablet Take 1 tablet by mouth daily 2/22/22   Otis Letters, APRN - CNP   brimonidine-timolol (COMBIGAN) 0.2-0.5 % ophthalmic solution Place 1 drop into the right eye every 12 hours. 11/12/13   Christinia Mortimer, MD       Allergies:  Lipitor [atorvastatin calcium] and Vytorin [ezetimibe-simvastatin]    Social History:   TOBACCO:   reports that he quit smoking about 24 years ago. He has a 30.00 pack-year smoking history. He quit smokeless tobacco use about 6 years ago. ETOH:   reports current alcohol use. OCCUPATION:      Family History:       Problem Relation Age of Onset    Asthma Mother     High Blood Pressure Mother     Stroke Mother        REVIEW OF SYSTEMS:  Negative except for   Pertinent items are noted in HPI.   Physical Exam:    Vitals: BP (!) 163/77   Pulse 62   Temp 98 °F (36.7 °C)   Resp 16   Ht 5' 9\" (1.753 m)   Wt 177 lb (80.3 kg)   SpO2 97%   BMI 26.14 kg/m²   General appearance: alert, appears stated age and cooperative  Skin: Skin color, texture, turgor normal. No rashes or lesions  HEENT: Head: Normocephalic, no lesions, without obvious abnormality. Neck: no adenopathy, no carotid bruit, no JVD, supple, symmetrical, trachea midline and thyroid not enlarged, symmetric, no tenderness/mass/nodules  Lungs: clear to auscultation bilaterally  Heart: regular rate and rhythm, S1, S2 normal, no murmur, click, rub or gallop  Abdomen: soft, non-tender; bowel sounds normal; no masses,  no organomegaly  Extremities: MOVENMENTS OF LEFT HIP ARE LIMITED AND PAINFUL. Neurologic: Mental status: Alert, oriented, thought content appropriate    CBC:   Recent Labs     03/17/22  1625   WBC 10.5   HGB 13.7        BMP:    Recent Labs     03/17/22  1625      K 3.9      CO2 26   BUN 22   CREATININE 1.2   GLUCOSE 77     Hepatic:   Recent Labs     03/17/22  1625   AST 29   ALT 8   BILITOT 0.4   ALKPHOS 87     CT SCAN LEFT HIP:  Impression:     Nondisplaced periprosthetic fracture of the lateral proximal left   femur/greater trochanter as detailed above. Other chronic appearing findings with prominent degenerative changes. ASSESSMENT:)  Active Hospital Problems    Diagnosis Date Noted    Hip fracture requiring operative repair, left, closed, initial encounter ([address] Porsche Perdue 03/17/2022    Type 2 diabetes mellitus with stage 3b chronic kidney disease, without long-term current use of insulin ([address] [E11.22, N18.32] 02/22/2022    Essential hypertension [I10] 07/08/2021    Paroxysmal A-fib ([address] [I48.0] 07/08/2021    Mixed hyperlipidemia [E78.2] 07/08/2021       PLAN: CONTINUE CURRENT MEDICATIONS. WILL RESUME ELIQUIS,ALTACE AND GLUCOTROL AS NO SURGERY IS PLANNED. PT/OT AND SOCIAL SERVICE FOR DISCHARGE PLANNING.     Electronically signed by Marlyn Ochoa MD on 3/18/22 at 11:39 AM EDT

## 2022-03-18 NOTE — PROGRESS NOTES
[address] [facility]  [facility]rielle Bazzi. [place]        Date:3/18/2022                                                  Patient Name: Julisa Gongora    MRN: [id number]    : 1934    Room: [address] [place] [address]      Evaluating OT: Alfonso Wilson OTR/L; [id number]     Referring Provider and Specific Provider Orders/Date:      22 0800  OT eval and treat  Start:  22 0800,   End:  22 0800,   ONE TIME,   Standing Count:  1 Occurrences,   R         Leydi Jon, DO      Placement Recommendation: Subacute        Diagnosis:   1.  Closed nondisplaced fracture of greater trochanter of left femur, initial encounter [facility])         Surgery: none       Pertinent Medical History:       Past Medical History:   Diagnosis Date    A-fib ([address]     Anticoagulant long-term use     Atrial fibrillation (HCC)     BPH (benign prostatic hyperplasia)     CAD (coronary artery disease)     Cataract of left eye 3/11/2014    Cerebrovascular disease     CHF (congestive heart failure) (HCC)     Diabetes mellitus ([address]     Dislocated IOL (intraocular lens), posterior 11/15/2013    Hearing loss     Hyperlipidemia     Hypertension     Osteoarthritis     Peripheral vascular disease ([address]     Renal cyst     Right cataract 2013    Type 2 diabetes mellitus with stage 3b chronic kidney disease, without long-term current use of insulin ([address] 2022    Type 2 diabetes mellitus without complication ([address]     Unspecified cerebral artery occlusion with cerebral infarction 10/2010    mild stroke         Past Surgical History:   Procedure Laterality Date    CATARACT REMOVAL WITH IMPLANT Right 2013    CATARACT REMOVAL WITH IMPLANT Left 13    EYE SURGERY Right 11/15/13    dislocated intraocular lens    JOINT REPLACEMENT      left hip      Precautions:  Fall Risk, TTWB: L LE, poor adherence to TTWB, impulsive, [place], R LE is swollen compared to L LE    CT HIP LEFT WO CONTRAST 3/17/22  Nondisplaced periprosthetic fracture of the lateral proximal left femur/greater trochanter as detailed above. Other chronic appearing findings with prominent degenerative changes. FINDINGS:   Streak artifact due to a left hip prosthesis.  As seen on the plain radiographs, there is a nondisplaced periprosthetic fracture of the proximal left femur laterally in the region of the greater trochanter.  1 of the fracture lines is located transversely at the base of the trochanter.  Other fracture line extends slightly obliquely into the lateral margin of the intertrochanteric region through the lateral cortex.       Assessment of current deficits:     [x] Functional mobility  [x]ADLs  [x] Strength               []Cognition    [x] Functional transfers   [x] IADLs         [x] Safety Awareness   [x]Endurance    [] Fine Coordination              [x] Balance      [] Vision/perception   []Sensation     []Gross Motor Coordination  [] ROM  [] Delirium                   [] Motor Control     OT PLAN OF CARE   OT POC based on physician orders, patient diagnosis and results of clinical assessment    Frequency/Duration 1-3 days/wk for 2 weeks PRN     Specific OT Treatment Interventions to include:   * Instruction/training on adapted ADL techniques and AE recommendations to increase functional independence within precautions       * Training on energy conservation strategies, correct breathing pattern and techniques to improve independence/tolerance for self-care routine  * Functional transfer/mobility training/DME recommendations for increased independence, safety, and fall prevention  * Patient/Family education to increase follow through with safety techniques and functional independence  * Recommendation of environmental modifications for increased safety with functional transfers/mobility and ADLs  * Splinting/positioning for increased function, prevention of contractures, and improve skin integrity  * Therapeutic exercise to improve motor endurance, ROM, and functional strength for ADLs/functional transfers  * Therapeutic activities to facilitate/challenge dynamic balance, stand tolerance for increased safety and independence with ADLs  * Positioning to improve skin integrity, interaction with environment and functional independence    Recommended Adaptive Equipment: TBD at rehab      Home Living: alone; single family home, 1 story, 3 steps to enter with B rails, walk in shower. 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       Cognition: A&O: 4/4; Follows 3 step directions   Memory: good    Sequencing: fair minus   Problem solving: fair minus   Judgement/safety: fair minus     AMPAC   AM-PAC Daily Activity Inpatient   How much help for putting on and taking off regular lower body clothing?: Total  How much help for Bathing?: A Lot  How much help for Toileting?: Total  How much help for putting on and taking off regular upper body clothing?: A Little  How much help for taking care of personal grooming?: A Little  How much help for eating meals?: None  AM-[facility] Inpatient Daily Activity Raw Score: 14  AM-PAC Inpatient ADL T-Scale Score : 33.39  ADL Inpatient CMS 0-100% Score: 59.67  ADL Inpatient CMS G-Code Modifier : CK     Functional Assessment:    Initial Eval Status  Date: 3/18/22 Treatment Status  Date: STGs = LTGs  Time frame: 10-14 days   Feeding Independent   Independent    Grooming Supervision with set up  Independent    UB Dressing Supervision with set up  Independent    LB Dressing Dependent to don socks Minimal Assist    Bathing Maximal Assist  Minimal Assist    Toileting Dependent for hygiene after using bedside commode  Minimal Assist    Bed Mobility  Supine to sit: Moderate Assist   Sit to supine: N/T as pt was up in chair   Supine to sit: Independent   Sit to supine: Independent    Functional Transfers Maximal assist from EOB to wheeled walker. Moderate Assist for transfer to and from bedside commode. Moderate Assist for transfer from standing to bedside chair. Transfer training with verbal cues for hand placement throughout session to improve safety. Independent    Functional Mobility Maximal assist with wheeled walker to improve balance from edge of bed to bedside commode, Maximal verbal cues for walker sequence and safety. Assistance for walker negotiation. Poor adherence to TTWB: L LE despite maximal verbal and tactile cues. Minimal Assist    Balance Sitting:     Static: fair minus    Dynamic: poor  Standing: fair minus with wheeled walker while bedside commode was switched out to bedside chair. Activity Tolerance Fair minus   good    Visual/  Perceptual Glasses: yes                 Hand Dominance: right      AROM (PROM) Strength Additional Info:    RUE  WFL 4/5 good  and wfl FMC/dexterity noted during ADL tasks     LUE WFL 4/5 good  and wfl FMC/dexterity noted during ADL tasks       Hearing: WFL   Sensation:  No c/o numbness or tingling  Tone: WFL   Edema: yes, R LE    Comments: Upon arrival the patient was supine. At end of session, patient was seated in the bedside chair with 2 pillows for added height. Call light and phone within reach, all lines and tubes intact. Overall patient demonstrated decreased independence and safety during completion of ADL/functional transfer/mobility tasks. Pt would benefit from continued skilled OT to increase safety and independence with completion of ADL/IADL tasks for functional independence and quality of life.     Treatment: OT treatment provided this date includes:    Instruction/training on safety and adapted techniques for completion of ADLs    Instruction/training on safe functional mobility/transfer techniques    Instruction/training on energy conservation/work simplification for completion of ADLs    Proper Positioning/Alignment   Instruction/training in weight bearing status and walker sequence    Rehab Potential: Good for established goals. Patient / Family Goal: go to rehab       Patient and/or family were instructed on functional diagnosis, prognosis/goals and OT plan of care. Demonstrated good understanding. Eval Complexity: Low    Time In: 8:30am   Time Out: 9:14am    Total Treatment Time: 24      Min Units   OT Eval Low 97165  X  1    OT Eval Medium 93072      OT Eval High 45126      OT Re-Eval 00099            ADL/Self Care 53807  14  1    Therapeutic Activities 30289  10  1   Therapeutic Ex 95613       Orthotic Management 63110       Manual [id number]     Neuro Re-Ed 19993       Non-Billable Time        Evaluation Time additionally includes thorough review of current medical information, gathering information on past medical history/social history and prior level of function, interpretation of standardized testing/informal observation of tasks, assessment of data and development of plan of care and goals.         Evaluating OT: Rachel Araujo OTR/L; [id number]

## 2022-03-18 NOTE — CARE COORDINATION
3/18/2022: SS Note/Discharge plan:  Met with pt who presented to the hospital with a hip fracture from a fall, non surgical, pt a&o, functioned independently prior to admission. Explained sw role for transition of care planning and therapy evals and recommendations for [facility] placement, pt reports that his son is at [facility] and prefers temporary rehab placement at their facility at discharge, referral made to [facility] admissions liaison, awaiting chart review and acceptance, PRE CERT NEEDED prior to transfer, dameonisng notified, sw to follow.  Electronically signed by PATY Barone on 3/18/2022 at 10:30 AM

## 2022-03-18 NOTE — CARE COORDINATION
3/18/2022: SS Note/Discharge plan:  Notified by Nikki Cason admission for [facility] that they have accepted pt medically and PRE CERT SUBMITTED TODAY 3/18 for [facility] placement, will need a RAPID COVID test prior to transfer, SHREYAS and HENS exemption initiated, pt, pt's dtr and nursing notified, sw to follow. Electronically signed by PATY Sigala on 3/18/2022 at 12:30 PM

## 2022-03-19 PROBLEM — N18.30 STAGE 3 CHRONIC KIDNEY DISEASE (HCC): Status: ACTIVE | Noted: 2022-03-19

## 2022-03-19 LAB
METER GLUCOSE: 158 MG/DL (ref 74–99)
SARS-COV-2, NAAT: NOT DETECTED

## 2022-03-19 PROCEDURE — 97530 THERAPEUTIC ACTIVITIES: CPT

## 2022-03-19 PROCEDURE — 1200000000 HC SEMI PRIVATE

## 2022-03-19 PROCEDURE — 6370000000 HC RX 637 (ALT 250 FOR IP): Performed by: STUDENT IN AN ORGANIZED HEALTH CARE EDUCATION/TRAINING PROGRAM

## 2022-03-19 PROCEDURE — 6370000000 HC RX 637 (ALT 250 FOR IP): Performed by: FAMILY MEDICINE

## 2022-03-19 PROCEDURE — 82962 GLUCOSE BLOOD TEST: CPT

## 2022-03-19 PROCEDURE — 87635 SARS-COV-2 COVID-19 AMP PRB: CPT

## 2022-03-19 PROCEDURE — 99232 SBSQ HOSP IP/OBS MODERATE 35: CPT | Performed by: FAMILY MEDICINE

## 2022-03-19 PROCEDURE — 97110 THERAPEUTIC EXERCISES: CPT

## 2022-03-19 RX ADMIN — BRIMONIDINE TARTRATE 1 DROP: 2 SOLUTION/ DROPS OPHTHALMIC at 10:03

## 2022-03-19 RX ADMIN — APIXABAN 5 MG: 5 TABLET, FILM COATED ORAL at 10:03

## 2022-03-19 RX ADMIN — APIXABAN 5 MG: 5 TABLET, FILM COATED ORAL at 19:59

## 2022-03-19 RX ADMIN — GLIPIZIDE 2.5 MG: 5 TABLET ORAL at 10:03

## 2022-03-19 RX ADMIN — BRIMONIDINE TARTRATE 1 DROP: 2 SOLUTION/ DROPS OPHTHALMIC at 21:28

## 2022-03-19 RX ADMIN — HYDROCODONE BITARTRATE AND ACETAMINOPHEN 2 TABLET: 5; 325 TABLET ORAL at 04:15

## 2022-03-19 RX ADMIN — PRAVASTATIN SODIUM 40 MG: 20 TABLET ORAL at 19:59

## 2022-03-19 RX ADMIN — HYDROCODONE BITARTRATE AND ACETAMINOPHEN 2 TABLET: 5; 325 TABLET ORAL at 10:04

## 2022-03-19 RX ADMIN — RAMIPRIL 2.5 MG: 2.5 CAPSULE ORAL at 10:05

## 2022-03-19 RX ADMIN — HYDROCODONE BITARTRATE AND ACETAMINOPHEN 2 TABLET: 5; 325 TABLET ORAL at 19:59

## 2022-03-19 ASSESSMENT — PAIN DESCRIPTION - PAIN TYPE
TYPE: ACUTE PAIN
TYPE: ACUTE PAIN

## 2022-03-19 ASSESSMENT — PAIN DESCRIPTION - ORIENTATION
ORIENTATION: LEFT
ORIENTATION: LEFT

## 2022-03-19 ASSESSMENT — PAIN SCALES - GENERAL
PAINLEVEL_OUTOF10: 5
PAINLEVEL_OUTOF10: 7
PAINLEVEL_OUTOF10: 0
PAINLEVEL_OUTOF10: 7
PAINLEVEL_OUTOF10: 5
PAINLEVEL_OUTOF10: 7

## 2022-03-19 ASSESSMENT — PAIN DESCRIPTION - LOCATION
LOCATION: HIP
LOCATION: HIP

## 2022-03-19 ASSESSMENT — PAIN DESCRIPTION - DESCRIPTORS
DESCRIPTORS: ACHING;DISCOMFORT
DESCRIPTORS: ACHING;DISCOMFORT

## 2022-03-19 NOTE — DISCHARGE SUMMARY
Discharge Summary    Armani Hoyos  :  1934  MRN:  [id number]    Admit date:  3/17/2022  Discharge date:  3/21/2022    Admitting Physician:  Heather Hamm MD    Admission Diagnoses: Hip fracture requiring operative repair, left, closed, initial encounter [facility]) [place]    Discharge Diagnoses: Active Hospital Problems    Diagnosis Date Noted    Stage 3 chronic kidney disease ([facility] [N18.30] 2022     Priority: High     Class: Chronic    Hip fracture requiring operative repair, left, closed, initial encounter ([facility] [place] 2022    Type 2 diabetes mellitus with stage 3b chronic kidney disease, without long-term current use of insulin ([facility] [E11.22, N18.32] 2022    Essential hypertension [I10] 2021    Paroxysmal A-fib ([facility] [I48.0] 2021    Mixed hyperlipidemia [E78.2] 2021       Consults:  orthopedic surgery    HPI/Hospital Course:   OBSERVE, PAIN CONTROL, PT/OT EVALUATION AND MANAGEMENT, TRANSFER TO REHAB. FACILITY. Discharge Medications:        Medication List      CONTINUE taking these medications    apixaban 5 MG Tabs tablet  Commonly known as: Eliquis  Take 1 tablet by mouth 2 times daily     brimonidine-timolol 0.2-0.5 % ophthalmic solution  Commonly known as: Combigan  Place 1 drop into the right eye every 12 hours.      furosemide 20 MG tablet  Commonly known as: Lasix  Take 1 tablet by mouth daily     glipiZIDE 2.5 MG extended release tablet  Commonly known as: Glucotrol XL  Take 1 tablet by mouth daily     pravastatin 40 MG tablet  Commonly known as: PRAVACHOL  Take 1 tablet by mouth at bedtime Note increase in dose     ramipril 2.5 MG capsule  Commonly known as: ALTACE  Take 1 capsule by mouth daily            Disposition:   SNF    CONDITION AT DISCHARGE: STABLE    Patient Instructions:   Current Discharge Medication List      CONTINUE these medications which have NOT CHANGED    Details   pravastatin (PRAVACHOL) 40 MG tablet Take 1 tablet by mouth at bedtime Note increase in dose  Qty: 90 tablet, Refills: 1    Associated Diagnoses: Mixed hyperlipidemia      ramipril (ALTACE) 2.5 MG capsule Take 1 capsule by mouth daily  Qty: 90 capsule, Refills: 1    Associated Diagnoses: Essential hypertension      apixaban (ELIQUIS) 5 MG TABS tablet Take 1 tablet by mouth 2 times daily  Qty: 180 tablet, Refills: 1    Associated Diagnoses: Paroxysmal A-fib (HCC)      furosemide (LASIX) 20 MG tablet Take 1 tablet by mouth daily  Qty: 30 tablet, Refills: 1    Associated Diagnoses: Essential hypertension      glipiZIDE (GLUCOTROL XL) 2.5 MG extended release tablet Take 1 tablet by mouth daily  Qty: 90 tablet, Refills: 1    Associated Diagnoses: Type 2 diabetes mellitus with stage 3b chronic kidney disease, without long-term current use of insulin ([facility])      brimonidine-timolol (COMBIGAN) 0.2-0.5 % ophthalmic solution Place 1 drop into the right eye every 12 hours.   Qty: 1 Bottle, Refills: 1           Activity: activity as tolerated  Diet: cardiac diet    Follow-up with ARIS GONZALEZ  in 1 WEEK      Note that over 30 minutes was spent in preparing discharge papers, discussing discharge with patient, medication review, etc.      Signed:  Georgia Alba MD 3/21/2022 11.16 AM.

## 2022-03-19 NOTE — PROGRESS NOTES
Physical Therapy    Physical Therapy Treatment Note/Plan of Care    Room #:  1198/6196-23  Patient Name: Juan Manuel Prado  YOB: 1934  MRN: [id number]    Date of Service: 3/19/2022     Tentative placement recommendation: subacute  Equipment recommendation: Equipment at Nursing Home      Evaluating Physical Therapist: Alfonso Vanessa, PT  #[id number]     Specific Provider Orders/Date/Referring Provider :  03/18/22 0800   PT eval and treat Start: 03/18/22 0800, End: 03/18/22 0800, ONE TIME, Standing Count: 1 Occurrences, R    Gillian Lange,       Admitting Diagnosis:   Hip fracture requiring operative repair, left, closed, initial encounter ([facility] [place]nez   Visit diagnosis:   Visit Diagnoses       Codes    Closed nondisplaced fracture of greater trochanter of left femur, initial encounter ([facility]    -  Primary S72.115A         pt fell and sustained  L non-displaced periprosthetic greater trochanteric femur fracture  Per dr Vesta Dawn: fracture of the greater trochanter which does not extend beyond the base of the trochanter which is even with the top of the femoral stem which appears to be well fixed throughout its length. There is no compromise of the acetabulum.   Conservative trt with Toe touch weight bearing       Patient Active Problem List   Diagnosis    Essential hypertension    Inflammatory arthritis    Paroxysmal A-fib ([facility]    Mixed hyperlipidemia    Pacemaker    Type 2 diabetes mellitus with stage 3b chronic kidney disease, without long-term current use of insulin (HCC)    Ichthyosis    Hip fracture requiring operative repair, left, closed, initial encounter ([facility]    Stage 3 chronic kidney disease ([facility] [facility]       ASSESSMENT of current deficits: Patient exhibits decreased strength, balance, endurance, range of motion, coordination and pain left lower extremity with movement impairing functional mobility, transfers, gait  and tolerance to activity are barriers to d/c and require skilled intervention during hospital stay to attain pre hospital level of function. Decreased strength, balance and endurance  increases patient's risk for fall. Pt with difficulty maintaining Toe touch weight bearing in standing and hopping. Pt able to perform a heel/toe shuffle on RLE while adhering to TTWB on LLE. Pt with AROM  for exercises performed today. PHYSICAL THERAPY  PLAN OF CARE       Physical therapy plan of care is established based on physician order,  patient diagnosis and clinical assessment    Current Treatment Recommendations:    -Bed Mobility: Lower extremity exercises , Upper extremity exercises  and Trunk control activities   -Standing Balance: Perform sit to stand activities maintaining TTWB (toe-touch weight bearing) weightbearing left lower extremity   -Transfers: Cues for hand placement, technique and safety. Provide stabilization to prevent fall  and Provide instruction on proper hand and left lower extremity placement to maintain NWB (non-weight bearing) weightbearing left lower extremity   -Gait: Gait training, Exercises to improve hip and knee control, Performance of protected weight bearing activities and Pregait training to emphasize: Sequencing , Device approximation and NWB (non-weight bearing)   -Endurance: Utilize Supervised activities to increase level of endurance to allow for safe functional mobility including transfers and gait     PT long term treatment goals are located in below grid    Patient and or family understand(s) diagnosis, prognosis, and plan of care. Frequency of treatments: Patient will be seen  twice daily  for therapeutic exercise, functional retraining, endurance activities, balance exercises, family and patient education.        Prior Level of Function: Patient ambulated independently    Rehab Potential: good    for baseline post healing phase    Past medical history:   Past Medical History:   Diagnosis Date    A-fib ([facility]     Anticoagulant long-term use  Atrial fibrillation (HCC)     BPH (benign prostatic hyperplasia)     CAD (coronary artery disease)     Cataract of left eye 3/11/2014    Cerebrovascular disease     CHF (congestive heart failure) (HCC)     Diabetes mellitus ([address]     Dislocated IOL (intraocular lens), posterior 11/15/2013    Hearing loss     Hyperlipidemia     Hypertension     Osteoarthritis     Peripheral vascular disease ([address]     Renal cyst     Right cataract 11/12/2013    Type 2 diabetes mellitus with stage 3b chronic kidney disease, without long-term current use of insulin ([address] 2/22/2022    Type 2 diabetes mellitus without complication ([facility])     Unspecified cerebral artery occlusion with cerebral infarction 10/2010    mild stroke     Past Surgical History:   Procedure Laterality Date    CATARACT REMOVAL WITH IMPLANT Right 11 12 2013    CATARACT REMOVAL WITH IMPLANT Left 03/11/13    EYE SURGERY Right 11/15/13    dislocated intraocular lens    JOINT REPLACEMENT      left hip         SUBJECTIVE:    Precautions:  , falls and alarm ,left lower extremity TTWB (toe-touch weight bearing)     Social history: Patient lives with sister   with 3 steps  to enter with [place] [employer] owned: ,  [facility] [employer] Mobility Inpatient   How much difficulty turning over in bed?: A Little  How much difficulty sitting down on / standing up from a chair with arms?: A Lot  How much difficulty moving from lying on back to sitting on side of bed?: A Lot  How much help from another person moving to and from a bed to a chair?: A Lot  How much help from another person needed to walk in hospital room?: A Lot  How much help from another person for climbing 3-5 steps with a railing?: Total  AM-PAC Inpatient Mobility Raw Score : 12  AM-PAC Inpatient T-Scale Score : 35.33  Mobility Inpatient CMS 0-100% Score: 68.66  Mobility Inpatient CMS G-Code Modifier : CL    Nursing cleared patient for PT treatment. Lino Bustos OBJECTIVE:   Initial Evaluation  Date: 3/18/2022 Treatment Date:  3/19/2022   Short Term/ Long Term   Goals   Was pt agreeable to Eval/treatment? Yes yes    Pain Level  3/10   Left hip   6/10  Left hip     Bed Mobility  Rolling: Minimal assist of 1    Supine to sit: Moderate assist of 1    Sit to supine: Moderate assist of 1    Scooting: Moderate assist of 1   Rolling: Minimal assist of 1   Supine to sit: Moderate assist of 1   Sit to supine: Not assessed    Scooting: Moderate assist of 1    Rolling: Independent    Supine to sit: Independent    Sit to supine: Independent    Scooting: Independent     Transfers Sit to stand: Moderate assist of  2 to maintain Toe touch weight bearing  Sit to stand: Moderate assist of 1 Cues for hand placement,TTWB on LLE, and safety       Sit to stand: Modified Independent     Ambulation    2 steps using  wheeled walker with Moderate assist of  2   for walker approximation, balance and toe touch weight bearing left lower extremity and cues for sequencing, TTWB (toe-touch weight bearing) weight bearing left lower extremity, upright posture, walker approximation, safety and proper hand placement   Heel toe x 2 steps to head of bed mod a of 2 4 steps using  wheeled walker with Moderate assist of  2   TTWB LLE to bedside commode, then 3 steps forward to swap out commode for bedside chair.    50 feet using walker with Modified Independent    Stair negotiation: ascended and descended   Not assessed       3 steps with rail and crutch Toe touch weight bearing min a   ROM Within functional limits except left lower extremity impaired 40 deg flexion    Increase range of motion 10% of affected joints    Strength Within functional limits  except  left lower extremity  2/5  Within functional limits   Balance Sitting EOB:  fair     Dynamic Standing:  poor  wheeled walker  Sitting EOB: fair   Dynamic Standing: poor wheeled walker   Sitting EOB:  good    Dynamic Standing:  good  walker      Patient

## 2022-03-19 NOTE — CARE COORDINATION
3/19/2022: SS Note/Discharge plan:  Notified by Nathaniel Padgett admission for [facility] that PRE CERT is still PENDING and are unable to accept pt until they receive authorization for [facility], is NOT a  \"click 6\" , nursing notified, SHREYAS and HENS initiated, nursing notified, sw to follow. Electronically signed by PATY Herr on 3/19/2022 at 1:28 PM

## 2022-03-19 NOTE — PLAN OF CARE
Problem: Falls - Risk of:  Goal: Will remain free from falls  Description: Will remain free from falls  Outcome: Met This Shift     Problem: Falls - Risk of:  Goal: Absence of physical injury  Description: Absence of physical injury  Outcome: Met This Shift     Problem: Pain:  Goal: Pain level will decrease  Description: Pain level will decrease  Outcome: Met This Shift     Problem: Pain:  Goal: Control of acute pain  Description: Control of acute pain  Outcome: Met This Shift     Problem: Skin Integrity:  Goal: Will show no infection signs and symptoms  Description: Will show no infection signs and symptoms  Outcome: Met This Shift     Problem: Skin Integrity:  Goal: Absence of new skin breakdown  Description: Absence of new skin breakdown  Outcome: Met This Shift     Problem: SAFETY  Goal: Free from accidental physical injury  Outcome: Met This Shift     Problem: DAILY CARE  Goal: Daily care needs are met  Outcome: Met This Shift     Problem: PAIN  Goal: Patient's pain/discomfort is manageable  Outcome: Met This Shift     Problem: SKIN INTEGRITY  Goal: Skin integrity is maintained or improved  Outcome: Met This Shift     Problem: KNOWLEDGE DEFICIT  Goal: Patient/S.O. demonstrates understanding of disease process, treatment plan, medications, and discharge instructions.   Outcome: Met This Shift

## 2022-03-19 NOTE — PROGRESS NOTES
Department of Orthopedic Surgery  Resident Progress Note    Patient seen and examined, resting in bed. Still with continued pain in the left hip. He does admit it is feeling slightly better this morning though. Patient did work with therapy yesterday. He was able to take several steps with wheeled walker  VITALS:  BP (!) 170/78   Pulse 88   Temp 97.6 °F (36.4 °C) (Oral)   Resp 20   Ht 5' 9\" (1.753 m)   Wt 177 lb (80.3 kg)   SpO2 98%   BMI 26.14 kg/m²     General: alert and oriented to person, place and time, well-developed and well-nourished, in no acute distress    MUSCULOSKELETAL:   left lower extremity:  · Skin is intact  · Tenderness to palpation about the left greater trochanteric region  · Compartments soft and compressible  · +PF/DF/EHL  · +2/4 DP & PT pulses, Brisk Cap refill, Toes warm and perfused  · Distal sensation grossly intact to Peroneals, Sural, Saphenous, and tibial nrs    CBC:   Lab Results   Component Value Date    WBC 10.5 03/17/2022    HGB 13.7 03/17/2022    HCT 42.3 03/17/2022     03/17/2022     PT/INR:    Lab Results   Component Value Date    PROTIME 12.7 03/17/2022    PROTIME 12.3 09/15/2020    INR 1.1 03/17/2022       ASSESSMENT  · S/P left periprosthetic femur fracture about the greater trochanter    PLAN      · Toe-touch weightbearing left lower extremity  · Pain control per primary  · Antibiotics as indicated by primary  · DVT Prophylaxis per primary, up as tolerated while using a walker  · Continue PT/OT as able  · No acute orthopedic intervention planned at this time.    Reiterated nonoperative plan with patient this morning  · Follow up with Dr. Isaias Knutson in 1 to 2 weeks  · We will continue to follow patient peripherally at this time  · Please call with questions or concerns

## 2022-03-20 LAB
METER GLUCOSE: 116 MG/DL (ref 74–99)
METER GLUCOSE: 81 MG/DL (ref 74–99)
METER GLUCOSE: 96 MG/DL (ref 74–99)
METER GLUCOSE: 98 MG/DL (ref 74–99)

## 2022-03-20 PROCEDURE — 97530 THERAPEUTIC ACTIVITIES: CPT

## 2022-03-20 PROCEDURE — 82962 GLUCOSE BLOOD TEST: CPT

## 2022-03-20 PROCEDURE — 99231 SBSQ HOSP IP/OBS SF/LOW 25: CPT | Performed by: FAMILY MEDICINE

## 2022-03-20 PROCEDURE — 6370000000 HC RX 637 (ALT 250 FOR IP): Performed by: FAMILY MEDICINE

## 2022-03-20 PROCEDURE — 1200000000 HC SEMI PRIVATE

## 2022-03-20 PROCEDURE — 6370000000 HC RX 637 (ALT 250 FOR IP): Performed by: STUDENT IN AN ORGANIZED HEALTH CARE EDUCATION/TRAINING PROGRAM

## 2022-03-20 RX ADMIN — HYDROCODONE BITARTRATE AND ACETAMINOPHEN 2 TABLET: 5; 325 TABLET ORAL at 02:47

## 2022-03-20 RX ADMIN — PRAVASTATIN SODIUM 40 MG: 20 TABLET ORAL at 21:37

## 2022-03-20 RX ADMIN — GLIPIZIDE 2.5 MG: 5 TABLET ORAL at 06:57

## 2022-03-20 RX ADMIN — APIXABAN 5 MG: 5 TABLET, FILM COATED ORAL at 08:24

## 2022-03-20 RX ADMIN — BRIMONIDINE TARTRATE 1 DROP: 2 SOLUTION/ DROPS OPHTHALMIC at 10:14

## 2022-03-20 RX ADMIN — APIXABAN 5 MG: 5 TABLET, FILM COATED ORAL at 21:00

## 2022-03-20 RX ADMIN — RAMIPRIL 2.5 MG: 2.5 CAPSULE ORAL at 10:14

## 2022-03-20 ASSESSMENT — PAIN SCALES - GENERAL
PAINLEVEL_OUTOF10: 8
PAINLEVEL_OUTOF10: 2

## 2022-03-20 ASSESSMENT — PAIN DESCRIPTION - LOCATION: LOCATION: HIP

## 2022-03-20 ASSESSMENT — PAIN DESCRIPTION - FREQUENCY: FREQUENCY: CONTINUOUS

## 2022-03-20 ASSESSMENT — PAIN DESCRIPTION - DESCRIPTORS: DESCRIPTORS: ACHING;DISCOMFORT

## 2022-03-20 ASSESSMENT — PAIN DESCRIPTION - PAIN TYPE: TYPE: ACUTE PAIN

## 2022-03-20 ASSESSMENT — PAIN DESCRIPTION - ORIENTATION: ORIENTATION: LEFT

## 2022-03-20 ASSESSMENT — PAIN - FUNCTIONAL ASSESSMENT: PAIN_FUNCTIONAL_ASSESSMENT: ACTIVITIES ARE NOT PREVENTED

## 2022-03-20 ASSESSMENT — PAIN DESCRIPTION - PROGRESSION: CLINICAL_PROGRESSION: NOT CHANGED

## 2022-03-20 ASSESSMENT — PAIN DESCRIPTION - ONSET: ONSET: ON-GOING

## 2022-03-20 NOTE — PROGRESS NOTES
HOSPITAL   PROGRESS NOTE. SUBJECTIVE:  Yevgeniy Sims, 80 y.o., male C/O  LEFT HIP FRACTURE. DISCHARGE HELD OFF BECAUSE PRE CERT STILL NOT COMPLETED. WILL PROBABLY BE DONE ON Monday. CONDITION DISCUSSED WITH  PATIENT  AND NURSING   STAFF. SOCIAL SERVICE  NOTES REVIEWED. ROS:GENERAL- APPETITE- GOOD. BOWEL MOVEMENTS- NORMAL. NO URINE    PROBLEM. EENT: NORMAL            CVS: NORMAL. NO CHEST PAIN OR PALPITATION. RESPIRATORY:NO SOB. GI/: NO ABDOMINAL PAINS            MUSCULOSKELETAL: NO COMPLAIN            CNS: NO  COMPLAIN            OBJECTIVE:    GENERAL:Temperature:  Current - Temp: 97.6 °F (36.4 °C); Max - Temp  Av.6 °F (36.4 °C)  Min: 97.6 °F (36.4 °C)  Max: 97.6 °F (36.4 °C)  Respiratory Rate : Resp  Av  Min: 16  Max: 18  Pulse Range: Pulse  Av  Min: 60  Max: 62  Blood Presuure Range:  Systolic (73TCQ), MNK:728 , Min:150 , MONSERRAT:932   ; Diastolic (39IIK), XYS:31, Min:68, Max:76    Pulse ox Range: SpO2  Av %  Min: 92 %  Max: 92 %  24hr I & O:    Intake/Output Summary (Last 24 hours) at 3/20/2022 1044  Last data filed at 3/20/2022 0802  Gross per 24 hour   Intake 480 ml   Output 350 ml   Net 130 ml       [address] [facility]. NECK:  supple, symmetrical, trachea midline,Carotids- normal,JVP- flat  HEMATOLOGIC/LYMPHATICS:  no cervical lymphadenopathy  LUNGS:clear  CARDIOVASCULAR:  Normal apical impulse, regular rate and rhythm, normal S1 and S2, no S3 or S4, and no murmur noted  ABDOMEN:  normal bowel sounds, non-distended, non-tender, no masses palpated  EXTREMITIES: ARTHRITIC CHANGES. MOVEMENTS-LIMITED. PEDAL PULSES-FAIR.   SKIN:  normal skin color, texture, turgor    Data    CBC:   Lab Results   Component Value Date    WBC 10.5 2022    RBC 4.21 2022    HGB 13.7 2022    HCT 42.3 2022    .5 2022    MCH 32.5 03/17/2022    MCHC 32.4 03/17/2022    RDW 13.6 03/17/2022     03/17/2022    MPV 10.6 03/17/2022     CMP:    Lab Results   Component Value Date     03/17/2022    K 3.9 03/17/2022     03/17/2022    CO2 26 03/17/2022    BUN 22 03/17/2022    CREATININE 1.2 03/17/2022    CREATININE 1.2 03/23/2020    CREATININE 1.2 03/23/2020    GFRAA >60 03/17/2022    LABGLOM 57 03/17/2022    GLUCOSE 77 03/17/2022    GLUCOSE 137 03/05/2012    PROT 7.1 03/17/2022    LABALBU 3.8 03/17/2022    LABALBU 4.1 03/05/2012    CALCIUM 10.1 03/17/2022    BILITOT 0.4 03/17/2022    ALKPHOS 87 03/17/2022    AST 29 03/17/2022    ALT 8 03/17/2022         ASSESSMENT:    Active Hospital Problems    Diagnosis Date Noted    Stage 3 chronic kidney disease ([facility] [N18.30] 03/19/2022     Priority: High     Class: Chronic    Hip fracture requiring operative repair, left, closed, [facility] Conchita De La Cruz 03/17/2022    Type 2 diabetes mellitus with stage 3b chronic kidney disease, without long-term current use of insulin ([facility] [E11.22, N18.32] 02/22/2022    Essential hypertension [I10] 07/08/2021    Paroxysmal A-fib ([facility] [I48.0] 07/08/2021    Mixed hyperlipidemia [E78.2] 07/08/2021       PLAN: CONTINUE CURRENT MEDICATIONS AND Rx.       Electronically signed by Tacho Elena MD on 3/19/22 at 10:44 AM EDT

## 2022-03-20 NOTE — PLAN OF CARE
Problem: Falls - Risk of:  Goal: Will remain free from falls  Description: Will remain free from falls  3/20/2022 0837 by Chadd Bates RN  Outcome: Met This Shift  3/19/2022 2248 by Zaynab Andino RN  Outcome: Met This Shift  Goal: Absence of physical injury  Description: Absence of physical injury  3/20/2022 0837 by Chadd Bates RN  Outcome: Met This Shift  3/19/2022 2248 by Zaynab Andino RN  Outcome: Met This Shift     Problem: Pain:  Description: Pain management should include both nonpharmacologic and pharmacologic interventions.   Goal: Pain level will decrease  Description: Pain level will decrease  3/19/2022 2248 by Zaynab Andino RN  Outcome: Met This Shift  Goal: Control of acute pain  Description: Control of acute pain  3/20/2022 0837 by Chadd Bates RN  Outcome: Met This Shift  3/19/2022 2248 by Zaynab Andino RN  Outcome: Met This Shift  Goal: Control of chronic pain  Description: Control of chronic pain  3/19/2022 2248 by Zaynab Andino RN  Outcome: Met This Shift     Problem: Skin Integrity:  Goal: Will show no infection signs and symptoms  Description: Will show no infection signs and symptoms  3/20/2022 0837 by Chadd Bates RN  Outcome: Met This Shift  3/19/2022 2248 by Zaynab Andino RN  Outcome: Met This Shift  Goal: Absence of new skin breakdown  Description: Absence of new skin breakdown  3/20/2022 0837 by Chadd Bates RN  Outcome: Met This Shift  3/19/2022 2248 by Zaynab Andino RN  Outcome: Met This Shift     Problem: SAFETY  Goal: Free from accidental physical injury  3/20/2022 0837 by Chadd Bates RN  Outcome: Met This Shift  3/19/2022 2248 by Zaynab Andino RN  Outcome: Met This Shift     Problem: DAILY CARE  Goal: Daily care needs are met  3/19/2022 2248 by Zaynab Andino RN  Outcome: Met This Shift     Problem: PAIN  Goal: Patient's pain/discomfort is manageable  3/20/2022 0837 by Chadd Bates RN  Outcome: Met This Shift  3/19/2022 2248 by Romel Treviño RN  Outcome: Met This Shift     Problem: SKIN INTEGRITY  Goal: Skin integrity is maintained or improved  3/19/2022 2248 by Romel Treviño RN  Outcome: Met This Shift     Problem: KNOWLEDGE DEFICIT  Goal: Patient/S.O. demonstrates understanding of disease process, treatment plan, medications, and discharge instructions.   3/19/2022 2248 by Romel Treviño RN  Outcome: Met This Shift

## 2022-03-20 NOTE — PROGRESS NOTES
Physical Therapy    Physical Therapy Treatment Note/Plan of Care    Room #:  2688/5842-50  Patient Name: Alicia Hull  YOB: 1934  MRN: [id number]    Date of Service: 3/20/2022     Tentative placement recommendation: subacute  Equipment recommendation: Equipment at Nursing Home      Evaluating Physical Therapist: Tami Macias, PT  #[id number]     Specific Provider Orders/Date/Referring Provider :  03/18/22 0800   PT eval and treat Start: 03/18/22 0800, End: 03/18/22 0800, ONE TIME, Standing Count: 1 Occurrences, R    Duane Church,       Admitting Diagnosis:   Hip fracture requiring operative repair, left, closed, initial encounter ([facility] [place]   Visit diagnosis:   Visit Diagnoses       Codes    Closed nondisplaced fracture of greater trochanter of left femur, initial encounter ([facility]    -  Primary S72.115A         pt fell and sustained  L non-displaced periprosthetic greater trochanteric femur fracture  Per dr Rachel Ness: fracture of the greater trochanter which does not extend beyond the base of the trochanter which is even with the top of the femoral stem which appears to be well fixed throughout its length. There is no compromise of the acetabulum.   Conservative trt with Toe touch weight bearing       Patient Active Problem List   Diagnosis    Essential hypertension    Inflammatory arthritis    [facility]ox[facility]    Mixed hyperlipidemia    Pacemaker    Type 2 diabetes mellitus with stage 3b chronic kidney disease, without long-term current use of insulin (HCC)    Ichthyosis    [facility] requiring operative repair, [facility]    Stage 3 chronic kidney disease ([facility]       ASSESSMENT of current deficits: Patient exhibits decreased strength, balance, endurance, range of motion, coordination and pain left lower extremity with movement impairing functional mobility, transfers, gait  and tolerance to activity are barriers to d/c and require skilled ambulated independently    Rehab Potential: good    for baseline post healing phase    Past medical history:   Past Medical History:   Diagnosis Date    A-fib ([address]     Anticoagulant long-term use     Atrial fibrillation (HCC)     BPH (benign prostatic hyperplasia)     CAD (coronary artery disease)     Cataract of left eye 3/11/2014    Cerebrovascular disease     CHF (congestive heart failure) (HCC)     Diabetes mellitus ([address]     Dislocated IOL (intraocular lens), posterior 11/15/2013    Hearing loss     Hyperlipidemia     Hypertension     Osteoarthritis     Peripheral vascular disease ([address]     Renal cyst     Right cataract 11/12/2013    Type 2 diabetes mellitus with stage 3b chronic kidney disease, without long-term current use of insulin ([address] 2/22/2022    Type 2 diabetes mellitus without complication (HCC)     Unspecified cerebral artery occlusion with cerebral infarction 10/2010    mild stroke     Past Surgical History:   Procedure Laterality Date    CATARACT REMOVAL WITH IMPLANT Right 11 12 2013    CATARACT REMOVAL WITH IMPLANT Left 03/11/13    EYE SURGERY Right 11/15/13    dislocated intraocular lens    JOINT REPLACEMENT      left hip         SUBJECTIVE:    Precautions:  , falls and alarm ,left lower extremity TTWB (toe-touch weight bearing)     Social history: Patient lives with sister   with 3 steps  to enter with [address]: ,  [facility]   How much difficulty turning over in bed?: None  How much difficulty sitting down on / standing up from a chair with arms?: A Lot  How much difficulty moving from lying on back to sitting on side of bed?: A Little  How much help from another person moving to and from a bed to a chair?: A Lot  How much help from another person needed to walk in hospital room?: A Lot  How much help from another person for climbing 3-5 steps with a railing?: Total  AM-PAC Inpatient Mobility Raw Score : 14  AM-PAC Inpatient T-Scale Score : 38.1  Mobility Inpatient CMS 0-100% Score: 61.29  Mobility Inpatient CMS G-Code Modifier : CL    Nursing cleared patient for PT treatment. .     OBJECTIVE:   Initial Evaluation  Date: 3/18/2022 Treatment Date:  3/20/2022   Short Term/ Long Term   Goals   Was pt agreeable to Eval/treatment? Yes yes    Pain Level  3/10   Left hip   5/10 L hip pain     Bed Mobility  Rolling: Minimal assist of 1    Supine to sit: Moderate assist of 1    Sit to supine: Moderate assist of 1    Scooting: Moderate assist of 1   Rolling: Supervision    Supine to sit: Moderate assist of 1   Sit to supine: Moderate assist of 1   Scooting: Minimal assist of 1    Rolling: Independent    Supine to sit: Independent    Sit to supine: Independent    Scooting: Independent     Transfers Sit to stand: Moderate assist of  2 to maintain Toe touch weight bearing  Sit to stand: Moderate assist of 1 Cues for hand placement,TTWB on LLE, and safety       Sit to stand: Modified Independent     Ambulation    2 steps using  wheeled walker with Moderate assist of  2   for walker approximation, balance and toe touch weight bearing left lower extremity and cues for sequencing, TTWB (toe-touch weight bearing) weight bearing left lower extremity, upright posture, walker approximation, safety and proper hand placement   Heel toe x 2 steps to head of bed mod a of 2 4 heel toe steps using  wheeled walker with Moderate assist of 1   TTWB LLE.  Cues for WB status, sequencing and safety   50 feet using walker with Modified Independent    Stair negotiation: ascended and descended   Not assessed       3 steps with rail and crutch Toe touch weight bearing min a   ROM Within functional limits except left lower extremity impaired 40 deg flexion    Increase range of motion 10% of affected joints    Strength Within functional limits  except  left lower extremity  2/5  Within functional limits   Balance Sitting EOB:  fair     Dynamic Standing:  poor  wheeled walker  Sitting EOB: fair   Dynamic Standing: fair - wheeled walker   Sitting EOB:  good    Dynamic Standing:  good  walker      Patient is Alert & Oriented x person, place, time and situation and follows one step directions    Sensation:  Patient denies numbness/tingling  Edema:  yes right lower extremity chronic       Patient education  Patient educated on role of Physical Therapy, risks of immobility, safety and plan of care, energy conservation,  importance of mobility while in hospital , ankle pumps, quad set and glut set for edema control, blood clot prevention, safety , weight bearing status  and proper use/technique of incentive spirometer      Patient response to education:   Pt verbalized understanding Pt demonstrated skill Pt requires further education in this area   Yes Partial Yes       Treatment:  Patient practiced and was instructed in the following treatment: Patient assisted to edge of bed and performed seated exercises. Patient assisted to standing to wheeled walker and took heel/toe steps to head of bed and back to supine position in bed. At end of session, patient in bed with alarm call light and phone within reach,   all lines and tubes intact, nursing notified. Patient would benefit from continued skilled Physical Therapy to improve functional independence and quality of life. Patient's/ family goals   home      Patient and or family understand(s) diagnosis, prognosis, and plan of care. Frequency of treatments: Patient will be seen  twice daily  for therapeutic exercise, functional retraining, endurance activities, balance exercises, family and patient education.      Time in 952  Time out 1007    Total Treatment Time  15 minutes    CPT codes:    Therapeutic activities (07777)   15 minutes  1 unit(s)    Willy Ferguson, [facility]  #[id number]

## 2022-03-20 NOTE — PROGRESS NOTES
HOSPITAL   PROGRESS NOTE. SUBJECTIVE:  Doyle Warner, 80 y.o., male C/O FALL WITH LEFT HIP FRACTURE. DISCHARGE HELD BECAUSE OF PRO CERT APPROVAL. CONDITION DISCUSSED WITH  PATIENT  AND NURSING   STAFF. CONSULT NOTES REVIEWED. ROS:GENERAL- APPETITE- GOOD. BOWEL MOVEMENTS- NORMAL. NO URINE    PROBLEM. EENT: NORMAL            CVS: NORMAL. NO CHEST PAIN OR PALPITATION. RESPIRATORY:NO SOB. GI/: NO ABDOMINAL PAINS            MUSCULOSKELETAL: NO COMPLAIN            CNS: NO  COMPLAIN            OBJECTIVE:    GENERAL:Temperature:  Current - Temp: 97.6 °F (36.4 °C); Max - Temp  Av.6 °F (36.4 °C)  Min: 97.6 °F (36.4 °C)  Max: 97.6 °F (36.4 °C)  Respiratory Rate : Resp  Av  Min: 16  Max: 18  Pulse Range: Pulse  Av  Min: 60  Max: 62  Blood Presuure Range:  Systolic (39LRV), JND:809 , Min:150 , LXN:545   ; Diastolic (38PQM), MGE:49, Min:68, Max:76    Pulse ox Range: SpO2  Av %  Min: 92 %  Max: 92 %  24hr I & O:    Intake/Output Summary (Last 24 hours) at 3/20/2022 1040  Last data filed at 3/20/2022 0802  Gross per 24 hour   Intake 480 ml   Output 350 ml   Net 130 ml       [facility]. NECK:  supple, symmetrical, trachea midline,Carotids- normal,JVP- flat  HEMATOLOGIC/LYMPHATICS:  no cervical lymphadenopathy  LUNGS:clear  CARDIOVASCULAR:  Normal apical impulse, regular rate and rhythm, normal S1 and S2, no S3 or S4, and no murmur noted  ABDOMEN:  normal bowel sounds, non-distended, non-tender, no masses palpated  EXTREMITIES: ARTHRITIC CHANGES. MOVEMENTS-LIMITED. PEDAL PULSES-FAIR.   SKIN:  normal skin color, texture, turgor    Data    CBC:   Lab Results   Component Value Date    WBC 10.5 2022    RBC 4.21 2022    HGB 13.7 2022    HCT 42.3 2022    .5 2022    MCH 32.5 2022    MCHC 32.4 2022    RDW 13.6 03/17/2022     03/17/2022    MPV 10.6 03/17/2022     CMP:    Lab Results   Component Value Date     03/17/2022    K 3.9 03/17/2022     03/17/2022    CO2 26 03/17/2022    BUN 22 03/17/2022    CREATININE 1.2 03/17/2022    CREATININE 1.2 03/23/2020    CREATININE 1.2 03/23/2020    GFRAA >60 03/17/2022    LABGLOM 57 03/17/2022    GLUCOSE 77 03/17/2022    GLUCOSE 137 03/05/2012    PROT 7.1 03/17/2022    LABALBU 3.8 03/17/2022    LABALBU 4.1 03/05/2012    CALCIUM 10.1 03/17/2022    BILITOT 0.4 03/17/2022    ALKPHOS 87 03/17/2022    AST 29 03/17/2022    ALT 8 03/17/2022         ASSESSMENT:    Active Hospital Problems    Diagnosis Date Noted    Stage 3 chronic kidney disease ([facility] [N18.30] 03/19/2022     Priority: High     Class: Chronic    Hip fracture requiring operative repair, left, closed, initial encounter ([facility] Rachel Hairston 03/17/2022    Type 2 diabetes mellitus with stage 3b chronic kidney disease, without long-term current use of insulin ([address] [E11.22, N18.32] 02/22/2022    Essential hypertension [I10] 07/08/2021    Paroxysmal A-fib ([address] [I48.0] 07/08/2021    Mixed hyperlipidemia [E78.2] 07/08/2021       PLAN: CONTINUE CURRENT MEDICATIONS AND Rx.AWAIT PRE CERT COMPLICATION FOR TRANSFER TO NURSING FACILITY.       Electronically signed by Edsno Sanabria MD on 3/20/22 at 10:40 AM EDT

## 2022-03-20 NOTE — PLAN OF CARE
Problem: Falls - Risk of:  Goal: Will remain free from falls  Description: Will remain free from falls  3/19/2022 2248 by Terrance Garcia RN  Outcome: Met This Shift  3/19/2022 1448 by Jeannette Yip RN  Outcome: Met This Shift  Goal: Absence of physical injury  Description: Absence of physical injury  3/19/2022 2248 by Terrance Garcia RN  Outcome: Met This Shift  3/19/2022 1448 by Jeannette Yip RN  Outcome: Met This Shift     Problem: Pain:  Goal: Pain level will decrease  Description: Pain level will decrease  3/19/2022 2248 by Terrance Garcia RN  Outcome: Met This Shift  3/19/2022 1448 by Jeannette Yip RN  Outcome: Met This Shift  Goal: Control of acute pain  Description: Control of acute pain  3/19/2022 2248 by Terrance Garcia RN  Outcome: Met This Shift  3/19/2022 1448 by Jeannette Yip RN  Outcome: Met This Shift  Goal: Control of chronic pain  Description: Control of chronic pain  Outcome: Met This Shift     Problem: Skin Integrity:  Goal: Will show no infection signs and symptoms  Description: Will show no infection signs and symptoms  3/19/2022 2248 by Terrance Garcia RN  Outcome: Met This Shift  3/19/2022 1448 by Jeannette Yip RN  Outcome: Met This Shift  Goal: Absence of new skin breakdown  Description: Absence of new skin breakdown  3/19/2022 2248 by Terrance Garcia RN  Outcome: Met This Shift  3/19/2022 1448 by Jeannette Yip RN  Outcome: Met This Shift     Problem: SAFETY  Goal: Free from accidental physical injury  3/19/2022 2248 by Terrance Garcia RN  Outcome: Met This Shift  3/19/2022 1448 by Jeannette Yip RN  Outcome: Met This Shift     Problem: DAILY CARE  Goal: Daily care needs are met  3/19/2022 2248 by Terrance Garcia RN  Outcome: Met This Shift  3/19/2022 1448 by Jeannette Yip RN  Outcome: Met This Shift     Problem: PAIN  Goal: Patient's pain/discomfort is manageable  3/19/2022 2248 by Terrance Garcia RN  Outcome: Met This Shift  3/19/2022 1448 by Lacey Del Valle Fernie Martin RN  Outcome: Met This Shift     Problem: SKIN INTEGRITY  Goal: Skin integrity is maintained or improved  3/19/2022 2248 by Ursula Crouch RN  Outcome: Met This Shift  3/19/2022 1448 by Nanda Good RN  Outcome: Met This Shift     Problem: KNOWLEDGE DEFICIT  Goal: Patient/S.O. demonstrates understanding of disease process, treatment plan, medications, and discharge instructions.   3/19/2022 2248 by Ursula Crouch RN  Outcome: Met This Shift  3/19/2022 1448 by Nanda Good RN  Outcome: Met This Shift

## 2022-03-21 VITALS
BODY MASS INDEX: 26.22 KG/M2 | HEART RATE: 62 BPM | TEMPERATURE: 97.9 F | HEIGHT: 69 IN | RESPIRATION RATE: 16 BRPM | DIASTOLIC BLOOD PRESSURE: 80 MMHG | SYSTOLIC BLOOD PRESSURE: 155 MMHG | OXYGEN SATURATION: 95 % | WEIGHT: 177 LBS

## 2022-03-21 LAB — METER GLUCOSE: 102 MG/DL (ref 74–99)

## 2022-03-21 PROCEDURE — 97110 THERAPEUTIC EXERCISES: CPT

## 2022-03-21 PROCEDURE — 97530 THERAPEUTIC ACTIVITIES: CPT

## 2022-03-21 PROCEDURE — 6370000000 HC RX 637 (ALT 250 FOR IP): Performed by: STUDENT IN AN ORGANIZED HEALTH CARE EDUCATION/TRAINING PROGRAM

## 2022-03-21 PROCEDURE — 6370000000 HC RX 637 (ALT 250 FOR IP): Performed by: FAMILY MEDICINE

## 2022-03-21 PROCEDURE — 99239 HOSP IP/OBS DSCHRG MGMT >30: CPT | Performed by: FAMILY MEDICINE

## 2022-03-21 PROCEDURE — 82962 GLUCOSE BLOOD TEST: CPT

## 2022-03-21 RX ADMIN — RAMIPRIL 2.5 MG: 2.5 CAPSULE ORAL at 09:43

## 2022-03-21 RX ADMIN — BRIMONIDINE TARTRATE 1 DROP: 2 SOLUTION/ DROPS OPHTHALMIC at 09:41

## 2022-03-21 RX ADMIN — GLIPIZIDE 2.5 MG: 5 TABLET ORAL at 06:40

## 2022-03-21 RX ADMIN — HYDROCODONE BITARTRATE AND ACETAMINOPHEN 2 TABLET: 5; 325 TABLET ORAL at 09:41

## 2022-03-21 RX ADMIN — APIXABAN 5 MG: 5 TABLET, FILM COATED ORAL at 09:41

## 2022-03-21 ASSESSMENT — PAIN DESCRIPTION - FREQUENCY: FREQUENCY: CONTINUOUS

## 2022-03-21 ASSESSMENT — PAIN SCALES - GENERAL
PAINLEVEL_OUTOF10: 5
PAINLEVEL_OUTOF10: 7

## 2022-03-21 ASSESSMENT — PAIN DESCRIPTION - DESCRIPTORS: DESCRIPTORS: ACHING;DISCOMFORT;SORE;TENDER

## 2022-03-21 ASSESSMENT — PAIN DESCRIPTION - PROGRESSION: CLINICAL_PROGRESSION: NOT CHANGED

## 2022-03-21 ASSESSMENT — PAIN DESCRIPTION - LOCATION: LOCATION: HIP

## 2022-03-21 ASSESSMENT — PAIN DESCRIPTION - ORIENTATION: ORIENTATION: LEFT

## 2022-03-21 ASSESSMENT — PAIN DESCRIPTION - ONSET: ONSET: ON-GOING

## 2022-03-21 ASSESSMENT — PAIN DESCRIPTION - PAIN TYPE: TYPE: ACUTE PAIN

## 2022-03-21 NOTE — PROGRESS NOTES
Physical Therapy    Physical Therapy Treatment Note/Plan of Care    Room #:  2666/3491-35  Patient Name: Carri Garber  YOB: 1934  MRN: [id number]    Date of Service: 3/21/2022     Tentative placement recommendation: subacute  Equipment recommendation: Equipment at Nursing Home      Evaluating Physical Therapist: Johnny Miller, PT  #[id number]     Specific Provider Orders/Date/Referring Provider :  03/18/22 0800   PT eval and treat Start: 03/18/22 0800, End: 03/18/22 0800, ONE TIME, Standing Count: 1 Occurrences, R    Lianetbrayan Frey, DO      Admitting Diagnosis:   Hip fracture requiring operative repair, left, closed, initial encounter ([facility] [address]   Visit diagnosis:   Visit Diagnoses       Codes    Closed nondisplaced fracture of greater trochanter of left femur, initial encounter ([facility]    -  Primary S72.115A         pt fell and sustained  L non-displaced periprosthetic greater trochanteric femur fracture  Per dr Akhil Melendrez: fracture of the greater trochanter which does not extend beyond the base of the trochanter which is even with the top of the femoral stem which appears to be well fixed throughout its length. There is no compromise of the acetabulum.   Conservative trt with Toe touch weight bearing       Patient Active Problem List   Diagnosis    Essential hypertension    Inflammatory arthritis    Paroxysmal A-fib ([facility]    Mixed hyperlipidemia    Pacemaker    Type 2 diabetes mellitus with stage 3b chronic kidney disease, without long-term current use of insulin (HCC)    Ichthyosis    Hip fracture requiring operative repair, left, closed, initial encounter ([facility]    Stage 3 chronic kidney disease ([facility]       ASSESSMENT of current deficits: Patient exhibits decreased strength, balance, endurance, range of motion, coordination and pain left lower extremity with movement impairing functional mobility, transfers, gait  and tolerance to activity are barriers to d/c and require skilled intervention during hospital stay to attain pre hospital level of function. Decreased strength, balance and endurance  increases patient's risk for fall. Pt is weight bearing through LLE with standing and while he took steps to the bed even with consistent cueing for TTWB only. Pt not following doctor's TTWB orders any functional mobility. Patient would benefit from rehab stay d/t WB restriction, to inc safety, strength, and endurance levels as patient has an inc risk of falls because of said deficits. PHYSICAL THERAPY  PLAN OF CARE       Physical therapy plan of care is established based on physician order,  patient diagnosis and clinical assessment    Current Treatment Recommendations:    -Bed Mobility: Lower extremity exercises , Upper extremity exercises  and Trunk control activities   -Standing Balance: Perform sit to stand activities maintaining TTWB (toe-touch weight bearing) weightbearing left lower extremity   -Transfers: Cues for hand placement, technique and safety. Provide stabilization to prevent fall  and Provide instruction on proper hand and left lower extremity placement to maintain NWB (non-weight bearing) weightbearing left lower extremity   -Gait: Gait training, Exercises to improve hip and knee control, Performance of protected weight bearing activities and Pregait training to emphasize: Sequencing , Device approximation and NWB (non-weight bearing)   -Endurance: Utilize Supervised activities to increase level of endurance to allow for safe functional mobility including transfers and gait     PT long term treatment goals are located in below grid    Patient and or family understand(s) diagnosis, prognosis, and plan of care. Frequency of treatments: Patient will be seen  twice daily  for therapeutic exercise, functional retraining, endurance activities, balance exercises, family and patient education.        Prior Level of Function: Patient ambulated independently    Rehab Potential: good for baseline post healing phase    Past medical history:   Past Medical History:   Diagnosis Date    A-fib ([address]     Anticoagulant long-term use     Atrial fibrillation (HCC)     BPH (benign prostatic hyperplasia)     CAD (coronary artery disease)     Cataract of left eye 3/11/2014    Cerebrovascular disease     CHF (congestive heart failure) (HCC)     Diabetes mellitus ([address]     Dislocated IOL (intraocular lens), posterior 11/15/2013    Hearing loss     Hyperlipidemia     Hypertension     Osteoarthritis     Peripheral vascular disease ([address]     Renal cyst     Right cataract 11/12/2013    Type 2 diabetes mellitus with stage 3b chronic kidney disease, without long-term current use of insulin ([address] 2/22/2022    Type 2 diabetes mellitus without complication (HCC)     Unspecified cerebral artery occlusion with cerebral infarction 10/2010    mild stroke     Past Surgical History:   Procedure Laterality Date    CATARACT REMOVAL WITH IMPLANT Right 11 12 2013    CATARACT REMOVAL WITH IMPLANT Left 03/11/13    EYE SURGERY Right 11/15/13    dislocated intraocular lens    JOINT REPLACEMENT      left hip         SUBJECTIVE:    Precautions:  , falls and alarm ,left lower extremity TTWB (toe-touch weight bearing)     Social history: Patient lives with sister   with 3 steps  to enter with [facility] owned: ,  [address] [facility] Mobility Inpatient   How much difficulty turning over in bed?: A Little  How much difficulty sitting down on / standing up from a chair with arms?: A Lot  How much difficulty moving from lying on back to sitting on side of bed?: A Lot  How much help from another person moving to and from a bed to a chair?: A Lot  How much help from another person needed to walk in hospital room?: A Lot  How much help from another person for climbing 3-5 steps with a railing?: Total  AM-PAC Inpatient Mobility Raw Score : 12  AM-PAC Inpatient T-Scale Score : 35.33  Mobility Inpatient CMS 0-100% Score: 68.66  Mobility Inpatient CMS G-Code Modifier : CL    Nursing cleared patient for PT treatment. .     OBJECTIVE:   Initial Evaluation  Date: 3/18/2022 Treatment Date:  3/21/2022   Short Term/ Long Term   Goals   Was pt agreeable to Eval/treatment? Yes yes    Pain Level  3/10   Left hip   5/10 L hip pain     Bed Mobility  Rolling: Minimal assist of 1    Supine to sit: Moderate assist of 1    Sit to supine: Moderate assist of 1    Scooting: Moderate assist of 1   Rolling: Not assessed    Supine to sit: Not assessed patient sitting on the BSC   Sit to supine: Moderate assist of  2   Scooting: Not assessed     Rolling: Independent    Supine to sit: Independent    Sit to supine: Independent    Scooting: Independent     Transfers Sit to stand: Moderate assist of  2 to maintain Toe touch weight bearing  Sit to stand: Moderate assist of 1 Cues for hand placement,TTWB on LLE, and safety       Sit to stand: Modified Independent     Ambulation    2 steps using  wheeled walker with Moderate assist of  2   for walker approximation, balance and toe touch weight bearing left lower extremity and cues for sequencing, TTWB (toe-touch weight bearing) weight bearing left lower extremity, upright posture, walker approximation, safety and proper hand placement   Heel toe x 2 steps to head of bed mod a of 2 3 steps using  wheeled walker with Moderate assist of 1   TTWB LLE. Cues for TTWB status and safety.    50 feet using walker with Modified Independent    Stair negotiation: ascended and descended   Not assessed       3 steps with rail and crutch Toe touch weight bearing min a   ROM Within functional limits except left lower extremity impaired 40 deg flexion    Increase range of motion 10% of affected joints    Strength Within functional limits  except  left lower extremity  2/5  Within functional limits   Balance Sitting EOB:  fair     Dynamic Standing:  poor  wheeled walker  Sitting EOB: good Dynamic Standing: poor  wheeled walker   Sitting EOB:  good    Dynamic Standing:  good  walker      Patient is Alert & Oriented x person, place, time and situation and follows directions    Sensation:  Patient denies numbness/tingling  Edema:  yes right lower extremity chronic       Patient education  Patient educated on role of Physical Therapy, risks of immobility, safety and plan of care, energy conservation,  importance of mobility while in hospital , ankle pumps, quad set and glut set for edema control, blood clot prevention, safety , weight bearing status  and proper use/technique of incentive spirometer      Patient response to education:   Pt verbalized understanding Pt demonstrated skill Pt requires further education in this area   Yes Partial Yes       Treatment:  Patient practiced and was instructed in the following treatment: Patient sitting on the [facility] at start of tx session. Assisted nursing with standing pt for hygiene, cues throughout for TTWB on LLE, pt weight bearing through left lower extremity, took steps to the bed. Assisted pt to supine. Exercises: Not assessed    At end of session, patient in bed with alarm on call light and phone within reach,   all lines and tubes intact, nursing notified. Patient would benefit from continued skilled Physical Therapy to improve functional independence and quality of life. Patient's/ family goals   home      Patient and or family understand(s) diagnosis, prognosis, and plan of care. Frequency of treatments: Patient will be seen  twice daily  for therapeutic exercise, functional retraining, endurance activities, balance exercises, family and patient education. Time in 14:23  Time out 14:31    Total Treatment Time  8 minutes    CPT codes:    Therapeutic activities (59558)   8 minutes  1 unit(s)    Stephanie Chavez  [facility]  LIC # [id number]

## 2022-03-21 NOTE — CARE COORDINATION
3/21/2022: SS Note/Discharge plan: COVID NEGATIVE 3/19/2022:  Notified by Leonid Moore admission for [facility] that they received PRE CERT and transfer confirmed for today at 4:30pm via Irma maravilla pt and pt's daughter, Marc Tao notified of transfer arrangements, [address] and HENS exemption completed, nursing informed. Electronically signed by PATY Fitzgerald on 3/21/2022 at 11:51 AM

## 2022-03-21 NOTE — PLAN OF CARE
Problem: Falls - Risk of:  Goal: Will remain free from falls  Description: Will remain free from falls  Outcome: Met This Shift  Goal: Absence of physical injury  Description: Absence of physical injury  Outcome: Met This Shift     Problem: Pain:  Goal: Pain level will decrease  Description: Pain level will decrease  Outcome: Met This Shift  Goal: Control of acute pain  Description: Control of acute pain  Outcome: Met This Shift  Goal: Control of chronic pain  Description: Control of chronic pain  Outcome: Met This Shift     Problem: Skin Integrity:  Goal: Will show no infection signs and symptoms  Description: Will show no infection signs and symptoms  Outcome: Met This Shift  Goal: Absence of new skin breakdown  Description: Absence of new skin breakdown  Outcome: Met This Shift     Problem: SAFETY  Goal: Free from accidental physical injury  Outcome: Met This Shift     Problem: DAILY CARE  Goal: Daily care needs are met  Outcome: Met This Shift     Problem: PAIN  Goal: Patient's pain/discomfort is manageable  Outcome: Met This Shift     Problem: SKIN INTEGRITY  Goal: Skin integrity is maintained or improved  Outcome: Met This Shift     Problem: KNOWLEDGE DEFICIT  Goal: Patient/S.O. demonstrates understanding of disease process, treatment plan, medications, and discharge instructions.   Outcome: Met This Shift

## 2022-03-21 NOTE — PROGRESS NOTES
Physical Therapy    Physical Therapy Treatment Note/Plan of Care    Room #:  4047/0250-53  Patient Name: Fernando Morales  YOB: 1934  MRN: [id number]    Date of Service: 3/21/2022     Tentative placement recommendation: subacute  Equipment recommendation: Equipment at Nursing Home      Evaluating Physical Therapist: Claudine Byers, PT  #[id number]     Specific Provider Orders/Date/Referring Provider :  03/18/22 0800   PT eval and treat Start: 03/18/22 0800, End: 03/18/22 0800, ONE TIME, Standing Count: 1 Occurrences, R    Kayley Lima,       Admitting Diagnosis:   Hip fracture requiring operative repair, left, closed, initial encounter ([facility] Conrad Mckeon   Visit diagnosis:   Visit Diagnoses       Codes    Closed nondisplaced fracture of greater trochanter of left femur, initial encounter ([facility]    -  Primary S72.115A         pt fell and sustained  L non-displaced periprosthetic greater trochanteric femur fracture  Per dr Sandip Moreno: fracture of the greater trochanter which does not extend beyond the base of the trochanter which is even with the top of the femoral stem which appears to be well fixed throughout its length. There is no compromise of the acetabulum.   Conservative trt with Toe touch weight bearing       Patient Active Problem List   Diagnosis    Essential hypertension    Inflammatory arthritis    [facility]    Mixed hyperlipidemia    Pacemaker    Type 2 diabetes mellitus with stage 3b chronic kidney disease, without long-term current use of insulin (HCC)    Ichthyosis    [facility] fracture requiring operative repair, left, closed[facility]    Stage 3 chronic kidney disease ([facility]       ASSESSMENT of current deficits: Patient exhibits decreased strength, balance, endurance, range of motion, coordination and pain left lower extremity with movement impairing functional mobility, transfers, gait  and tolerance to activity are barriers to d/c and require skilled intervention during hospital stay to attain pre hospital level of function. Decreased strength, balance and endurance  increases patient's risk for fall. Pt having difficulty adhering to TTWB on LLE with standing and hopping to the chair. Pt is weight bearing through LLE with consistent cueing for TTWB only. Pt able to perform heel/toe shuffle on RLE last week but could not today. Pt with AROM on RLE/ AAROM/AROM on LLE for exercises. Patient would benefit from rehab stay d/t WB restriction, to inc safety, strength, and endurance levels as patient has an inc risk of falls because of said deficits. PHYSICAL THERAPY  PLAN OF CARE       Physical therapy plan of care is established based on physician order,  patient diagnosis and clinical assessment    Current Treatment Recommendations:    -Bed Mobility: Lower extremity exercises , Upper extremity exercises  and Trunk control activities   -Standing Balance: Perform sit to stand activities maintaining TTWB (toe-touch weight bearing) weightbearing left lower extremity   -Transfers: Cues for hand placement, technique and safety. Provide stabilization to prevent fall  and Provide instruction on proper hand and left lower extremity placement to maintain NWB (non-weight bearing) weightbearing left lower extremity   -Gait: Gait training, Exercises to improve hip and knee control, Performance of protected weight bearing activities and Pregait training to emphasize: Sequencing , Device approximation and NWB (non-weight bearing)   -Endurance: Utilize Supervised activities to increase level of endurance to allow for safe functional mobility including transfers and gait     PT long term treatment goals are located in below grid    Patient and or family understand(s) diagnosis, prognosis, and plan of care.     Frequency of treatments: Patient will be seen  twice daily  for therapeutic exercise, functional retraining, endurance activities, balance exercises, family and patient education.        Prior Level of Function: Patient ambulated independently    Rehab Potential: good    for baseline post healing phase    Past medical history:   Past Medical History:   Diagnosis Date    A-fib ([address]     Anticoagulant long-term use     Atrial fibrillation (HCC)     BPH (benign prostatic hyperplasia)     CAD (coronary artery disease)     Cataract of left eye 3/11/2014    Cerebrovascular disease     CHF (congestive heart failure) (HCC)     Diabetes mellitus ([address]     Dislocated IOL (intraocular lens), posterior 11/15/2013    Hearing loss     Hyperlipidemia     Hypertension     Osteoarthritis     Peripheral vascular disease ([address]     Renal cyst     Right cataract 11/12/2013    Type 2 diabetes mellitus with stage 3b chronic kidney disease, without long-term current use of insulin ([address] 2/22/2022    Type 2 diabetes mellitus without complication ([facility])     Unspecified cerebral artery occlusion with cerebral infarction 10/2010    mild stroke     Past Surgical History:   Procedure Laterality Date    CATARACT REMOVAL WITH IMPLANT Right 11 12 2013    CATARACT REMOVAL WITH IMPLANT Left 03/11/13    EYE SURGERY Right 11/15/13    dislocated intraocular lens    JOINT REPLACEMENT      left hip         SUBJECTIVE:    Precautions:  , falls and alarm ,left lower extremity TTWB (toe-touch weight bearing)     Social history: Patient lives with sister   with 3 steps  to enter with [facility] owned: ,  [address] [facility] Mobility Inpatient   How much difficulty turning over in bed?: A Little  How much difficulty sitting down on / standing up from a chair with arms?: A Lot  How much difficulty moving from lying on back to sitting on side of bed?: A Lot  How much help from another person moving to and from a bed to a chair?: A Lot  How much help from another person needed to walk in hospital room?: A Lot  How much help from another person for climbing 3-5 steps with a railing?: Total  AM-PAC Inpatient Mobility Raw Score : 12  AM-PAC Inpatient T-Scale Score : 35.33  Mobility Inpatient CMS 0-100% Score: 68.66  Mobility Inpatient CMS G-Code Modifier : CL    Nursing cleared patient for PT treatment. .     OBJECTIVE:   Initial Evaluation  Date: 3/18/2022 Treatment Date:  3/21/2022   Short Term/ Long Term   Goals   Was pt agreeable to Eval/treatment? Yes yes    Pain Level  3/10   Left hip   5/10 L hip pain     Bed Mobility  Rolling: Minimal assist of 1    Supine to sit: Moderate assist of 1    Sit to supine: Moderate assist of 1    Scooting: Moderate assist of 1   Rolling: Not assessed patient seated edge of bed   Supine to sit: Not assessed patient seated edge of bed   Sit to supine: Not assessed    Scooting: Not assessed     Rolling: Independent    Supine to sit: Independent    Sit to supine: Independent    Scooting: Independent     Transfers Sit to stand: Moderate assist of  2 to maintain Toe touch weight bearing  Sit to stand: Moderate assist of 1 Cues for hand placement,TTWB on LLE, and safety       Sit to stand: Modified Independent     Ambulation    2 steps using  wheeled walker with Moderate assist of  2   for walker approximation, balance and toe touch weight bearing left lower extremity and cues for sequencing, TTWB (toe-touch weight bearing) weight bearing left lower extremity, upright posture, walker approximation, safety and proper hand placement   Heel toe x 2 steps to head of bed mod a of 2 3 hops using  wheeled walker with Moderate assist of 1   TTWB LLE.  Cues for WB status, sequencing and safety   50 feet using walker with Modified Independent    Stair negotiation: ascended and descended   Not assessed       3 steps with rail and crutch Toe touch weight bearing min a   ROM Within functional limits except left lower extremity impaired 40 deg flexion    Increase range of motion 10% of affected joints    Strength Within functional limits  except  left lower extremity  2/5 Within functional limits   Balance Sitting EOB:  fair     Dynamic Standing:  poor  wheeled walker  Sitting EOB: good   Dynamic Standing: poor  wheeled walker   Sitting EOB:  good    Dynamic Standing:  good  walker      Patient is Alert & Oriented x person, place, time and situation and follows directions    Sensation:  Patient denies numbness/tingling  Edema:  yes right lower extremity chronic       Patient education  Patient educated on role of Physical Therapy, risks of immobility, safety and plan of care, energy conservation,  importance of mobility while in hospital , ankle pumps, quad set and glut set for edema control, blood clot prevention, safety , weight bearing status  and proper use/technique of incentive spirometer      Patient response to education:   Pt verbalized understanding Pt demonstrated skill Pt requires further education in this area   Yes Partial Yes       Treatment:  Patient practiced and was instructed in the following treatment: Patient sitting on the edge of the bed at start of tx session. Pt sat for 10 minutes to increase dynamic sitting balance and activity tolerance. Pt working on standing and trying to adhere to TTWB on LLE. I instructed and demonstrated heel/toe shuffle on RLE. Pt stood for 2 reps and hopped to the chair. Pt performed seated exercises. Exercises: ankle pumps, LAQ, hip abd/add, marching, x 20 - 25 reps. At end of session, patient in chair with call light and phone within reach,   all lines and tubes intact, nursing notified. Patient would benefit from continued skilled Physical Therapy to improve functional independence and quality of life. Patient's/ family goals   home      Patient and or family understand(s) diagnosis, prognosis, and plan of care. Frequency of treatments: Patient will be seen  twice daily  for therapeutic exercise, functional retraining, endurance activities, balance exercises, family and patient education.      Time in 08:30  Time out 08:53    Total Treatment Time  23 minutes    CPT codes:    Therapeutic activities (83263)   14 minutes  1 unit(s)  Therapeutic exercises (63373)   9 minutes  1 unit(s)    Román Chavez  [facility]  LIC # [id number]

## 2022-03-28 DIAGNOSIS — S72.002A HIP FRACTURE REQUIRING OPERATIVE REPAIR, LEFT, CLOSED, INITIAL ENCOUNTER (HCC): Primary | ICD-10-CM

## 2022-03-30 ENCOUNTER — OFFICE VISIT (OUTPATIENT)
Dept: ORTHOPEDIC SURGERY | Age: 87
End: 2022-03-30
Payer: MEDICARE

## 2022-03-30 VITALS — BODY MASS INDEX: 26.22 KG/M2 | WEIGHT: 177 LBS | HEIGHT: 69 IN

## 2022-03-30 DIAGNOSIS — S72.111A CLOSED DISPLACED FRACTURE OF GREATER TROCHANTER OF RIGHT FEMUR, INITIAL ENCOUNTER (HCC): Primary | ICD-10-CM

## 2022-03-30 PROCEDURE — 99213 OFFICE O/P EST LOW 20 MIN: CPT | Performed by: ORTHOPAEDIC SURGERY

## 2022-03-30 NOTE — PROGRESS NOTES
Chief Complaint:   Chief Complaint   Patient presents with    Hip Pain     Left Hip greater trochanter fracture on 3/17/2022. Tripped getting out of [employer], fell in the driveway. He did have left AMAIRANI 10+ years ago. James Sneed is 13 days post injury to his left hip area. This is nondisplaced greater trochanter fracture around a total hip replacement arthroplasty. He is not complaining of much pain. They will let him bear weight and he would like to get home to his dog. Allergies; medications; past medical, surgical, family, and social history; and problem list have been reviewed today and updated as indicated in this encounter seen below. Exam: There is minimal tenderness palpation around the left greater trochanter. Mr. Harpreet Last is able to get up out of the wheelchair by himself. Is able to stand. He was encouraged to put a little bit of weight on his left lower extremity. He expressed no pain    Radiographs: Previous imaging was reviewed and showed a nondisplaced greater trochanter fracture. The femoral component of the total hip replacement looks stable as does the acetabulum. ASSESSMENT:    Elias Mcneal was seen today for hip pain. Diagnoses and all orders for this visit:    Closed displaced fracture of greater trochanter of right femur, [facility]        PLAN: Progressive weightbearing starting at partial weight left lower extremity. Range of motion exercises and ambulation with walker for safety and standby or assist as needed. Will follow in 3 weeks    Return in about 3 weeks (around 4/20/2022).        Current Outpatient Medications   Medication Sig Dispense Refill    pravastatin (PRAVACHOL) 40 MG tablet Take 1 tablet by mouth at bedtime Note increase in dose 90 tablet 1    ramipril (ALTACE) 2.5 MG capsule Take 1 capsule by mouth daily 90 capsule 1    apixaban (ELIQUIS) 5 MG TABS tablet Take 1 tablet by mouth 2 times daily 180 tablet 1    furosemide (LASIX) 20 MG tablet Take 1 tablet by mouth daily 30 tablet 1    glipiZIDE (GLUCOTROL XL) 2.5 MG extended release tablet Take 1 tablet by mouth daily 90 tablet 1    brimonidine-timolol (COMBIGAN) 0.2-0.5 % ophthalmic solution Place 1 drop into the right eye every 12 hours. 1 Bottle 1     No current facility-administered medications for this visit.        Patient Active Problem List   Diagnosis    Essential hypertension    Inflammatory arthritis    Paroxysmal A-fib ([address]    Mixed hyperlipidemia    Pacemaker    Type 2 diabetes mellitus with stage 3b chronic kidney disease, without long-term current use of insulin ([facility]    Ichthyosis    Hip fracture requiring operative repair, left, closed, initial encounter ([address]    Stage 3 chronic kidney disease ([address]       Past Medical History:   Diagnosis Date    A-fib ([address]     Anticoagulant long-term use     Atrial fibrillation ([address]     BPH (benign prostatic hyperplasia)     CAD (coronary artery disease)     Cataract of left eye 3/11/2014    Cerebrovascular disease     CHF (congestive heart failure) ([address]     Diabetes mellitus ([address]     Dislocated IOL (intraocular lens), posterior 11/15/2013    Hearing loss     Hyperlipidemia     Hypertension     Osteoarthritis     Peripheral vascular disease ([address]     Renal cyst     Right cataract 11/12/2013    Type 2 diabetes mellitus with stage 3b chronic kidney disease, without long-term current use of insulin ([address] 2/22/2022    Type 2 diabetes mellitus without complication ([address]     Unspecified cerebral artery occlusion with cerebral infarction 10/2010    mild stroke       Past Surgical History:   Procedure Laterality Date    CATARACT REMOVAL WITH IMPLANT Right 11 12 2013    CATARACT REMOVAL WITH IMPLANT Left 03/11/13    EYE SURGERY Right 11/15/13    dislocated intraocular lens    JOINT REPLACEMENT      left hip       Allergies   Allergen Reactions    Lipitor [Atorvastatin Calcium] Other (See Comments)     Muscle cramps    Vytorin [Ezetimibe-Simvastatin]      Leg Cramps         Social History     Socioeconomic History    Marital status:      Spouse name: None    Number of children: None    Years of education: None    Highest education level: None   Occupational History    None   Tobacco Use    Smoking status: Former Smoker     Packs/day: 1.00     Years: 30.00     Pack years: 30.00     Quit date: 3/17/1998     Years since quittin.0    Smokeless tobacco: Former User     Quit date: 2015   Vaping Use    Vaping Use: Never used   Substance and Sexual Activity    Alcohol use: Yes     Comment: 2 beers daily    Drug use: No    Sexual activity: None   Other Topics Concern    None   Social History Narrative    None     Social Determinants of Health     Financial Resource Strain: Low Risk     Difficulty of Paying Living Expenses: Not hard at all   Food Insecurity: No Food Insecurity    Worried About Running Out of Food in the Last Year: Never true    Shayy of Food in the Last Year: Never true   Transportation Needs: No Transportation Needs    Lack of Transportation (Medical): No    Lack of Transportation (Non-Medical):  No   Physical Activity: Unknown    Days of Exercise per Week: 0 days    Minutes of Exercise per Session: Not on file   Stress:     Feeling of Stress : Not on file   Social Connections:     Frequency of Communication with Friends and Family: Not on file    Frequency of Social Gatherings with Friends and Family: Not on file    Attends Amish Services: Not on file    Active Member of Clubs or Organizations: Not on file    Attends Club or Organization Meetings: Not on file    Marital Status: Not on file   Intimate Partner Violence: Not At Risk    Fear of Current or Ex-Partner: No    Emotionally Abused: No    Physically Abused: No    Sexually Abused: No   Housing Stability: Unknown    Unable to Pay for Housing in the Last Year: No    Number of Places Lived in the Last Year: Not on file    Unstable Housing in the Last Year: No       Review of Systems  As follows except as previously noted in HPI:  Constitutional: Negative for chills, diaphoresis, fatigue, fever and unexpected weight change. Respiratory: Negative for cough, shortness of breath and wheezing. Cardiovascular: Negative for chest pain and palpitations. Neurological: Negative for dizziness, syncope, cephalgia. GI / : negative  Musculoskeletal: see HPI       Objective:   Physical Exam   Constitutional: Oriented to person, place, and time. and appears well-developed and well-nourished. :   Head: Normocephalic and atraumatic. Eyes: EOM are normal.   Neck: Neck supple. Cardiovascular: Normal rate and regular rhythm. Pulmonary/Chest: Effort normal. No stridor. No respiratory distress, no wheezes. Abdominal:  No abnormal distension. Neurological: Alert and oriented to person, place, and time. Skin: Skin is warm and dry. Psychiatric: Normal mood and affect.  Behavior is normal. Thought content normal.    BE Adhikari,     3/30/22  11:19 AM

## 2022-04-01 ENCOUNTER — TELEPHONE (OUTPATIENT)
Dept: FAMILY MEDICINE CLINIC | Age: 87
End: 2022-04-01

## 2022-04-01 DIAGNOSIS — S72.111A CLOSED DISPLACED FRACTURE OF GREATER TROCHANTER OF RIGHT FEMUR, INITIAL ENCOUNTER (HCC): Primary | ICD-10-CM

## 2022-04-01 NOTE — TELEPHONE ENCOUNTER
Patient requesting order for PT from [facility], has no preference so ok with [facility] ([facility]) and then anyone else if [facility] doesn't accept his insurance. Patient reported otherwise has in home support. If further social work is needed while the skilled home health care is active in the home, the referral would go to the Medical Social Worker of the home health care provider.

## 2022-04-01 NOTE — TELEPHONE ENCOUNTER
LSW received consult from PCP regarding patient needing home health, agreeable to PT. Called patient who requested order for PT from [facility], patient has no preference agreed to [facility] ([facility]); if [facility] doesn't accept his insurance will be okay with any other provider. Otherwise patient has in home support. Patient stated he left nursing home yesterday because wasn't getting the length and quality of physical therapy needed, and biggest problem is walking with walker due to only bearing weight on one leg. Patient stated he wasn't going to pay for it though if it cost money because he doesn't have any money to pay for it. LSW advised patient to discuss cost with [facility] when they call to schedule PT and for patient to consider the benefit of PT helping to restore patient's functioning. Patient lives alone in own home but has in home support from 2 neighbors, niece and daughter who check in on him daily and bring or make food, do laundry and provide transportation. Soheila Bethea comes in AM, neighbor Hermes Escalera comes mid day or evening, niece Sanjeev Franklin comes 4-8 pm, daughter does laundry and ninita or Dalton Desir provides rides, patient stated doesn't like riding in those vans. Patient using wheeled walker,lives on one floor, no throw rugs, has a closet full of home medical equipment from his son Soledad Lane buying it when Soledad Lane broke his arm. Patient's children getting patient an emergency response button, patient stated intent to wear at all times to keep his children from yelling at him about needing one due to fall. Has own car but can't drive with leg like it is now. Patient denied any social work assistance at this time.

## 2022-04-01 NOTE — TELEPHONE ENCOUNTER
I called patient who stated he does live alone, but he has family that comes in to help as they are able. Patient stated he is having difficulty with walking and using a walker. Patient stated he rec'd some PT while in the nursing home, but did not feel it was enough. Patient stated if [facility]lin Prado is able to provide him with some home health, he would be agreeable. Informed that I will let Brea know and that once done, someone should come in to assess him and his environment to see what type of needs he has. Patient stated understanding.      Last seen 2/22/2022  Next appt

## 2022-04-01 NOTE — TELEPHONE ENCOUNTER
I called patient to  appt, as requested by Dr. Carla Yost. Patient declined to RS, stating he is not doing well and will call back to make appt.

## 2022-04-01 NOTE — TELEPHONE ENCOUNTER
----- Message from David Cuevas sent at 4/1/2022  8:57 AM EDT -----  Subject: Message to Provider    QUESTIONS  Information for Provider? Pt wants to apologize for missing his appt. Will   call when he feels better  ---------------------------------------------------------------------------  --------------  CALL BACK INFO  What is the best way for the office to contact you? Do not leave any   message, patient will call back for answer  Preferred Call Back Phone Number? [phone]  ---------------------------------------------------------------------------  --------------  SCRIPT ANSWERS  Relationship to Patient?  Self

## 2022-04-01 NOTE — TELEPHONE ENCOUNTER
He had a fall with hip fracture, I have only seen him once but I think he lives alone. Does he need any help from home health?

## 2022-04-01 NOTE — TELEPHONE ENCOUNTER
Msg routed to [address] [place] [address], \[facility] [place] [facility]\"", to assist.

## 2022-04-06 ENCOUNTER — TELEPHONE (OUTPATIENT)
Dept: FAMILY MEDICINE CLINIC | Age: 87
End: 2022-04-06

## 2022-04-06 DIAGNOSIS — S72.002A HIP FRACTURE REQUIRING OPERATIVE REPAIR, LEFT, CLOSED, INITIAL ENCOUNTER (HCC): Primary | ICD-10-CM

## 2022-04-06 NOTE — TELEPHONE ENCOUNTER
LSW contacted PCP office, spoke with NAYELI De La Fuente to discuss clarification on home care, MA to speak with PCP and notify [facility] of preferred treatment for start of care. LSW advised will contact family to discuss skilled home health care, as patient had only requested home care PT, reported having in home support otherwise and denied having any other support needs at this time.

## 2022-04-06 NOTE — TELEPHONE ENCOUNTER
Clarification on home health orders, LSW called [facility], spoke with Wesly Garcia. Clinical staff needs to contact [facility] as to which services are preference for start of care. Already accepted/processing 4/4/22 order for PT alone with start of care 4/11/22 approved by provider. Upon start of care, provider can order addition of skilled nursing and home health aide, but can't staff nursing now. If provider wants to replace with new order from today 4/6/22 for expanded services of skilled nursing, PT and home health aide, Dominick Villela would have to cancel the previously accepted 4/4/22 order for PT alone, but start of care for all of the home health services in this order would be delayed until nursing start of care date. If need skilled nursing now, would need to cancel all orders for [facility] ([facility]) and contact different home health care agency. SW noted per chart review 4/4/22 order for PT was approved for start of care, but family called to request new order for added services of skilled nursing and home health aide.

## 2022-04-06 NOTE — TELEPHONE ENCOUNTER
LSW attempted phone call to daughter Lety Stout, left vm message requesting return call. LSW intended to discuss patient's needs regarding extent of skilled home health care services, as patient had only requested PT and denied needing any additional supports.

## 2022-04-06 NOTE — TELEPHONE ENCOUNTER
Daiana Carrizales called from [facility] stating they had an order for PT and were going forward with that. The received a 2nd order for nursing but can only handle one at a time. Would like verification as to which treatment should take preference.

## 2022-04-06 NOTE — TELEPHONE ENCOUNTER
----- Message from Jose Nadia sent at 4/5/2022  4:28 PM EDT -----  Subject: Message to Provider    QUESTIONS  Information for Provider? patient has hip fracture and left rehab facility   early. he has orders to start home PT on Monday but the family feels he   needs more. asking if orders for skilled nursing and home health aid could   also be including. call back to either mimi Toure or Niharika Heredia  ---------------------------------------------------------------------------  --------------  Christina LÓPEZ  What is the best way for the office to contact you? OK to leave message on   voicemail  Preferred Call Back Phone Number? [phone]  ---------------------------------------------------------------------------  --------------  SCRIPT ANSWERS  Relationship to Patient?  Self

## 2022-04-06 NOTE — TELEPHONE ENCOUNTER
Spoke with [facility], We are going with Pt for now and then adding other service on when available. Patient to start PT on the 11th. Also spoke with Dr. Bryon Zuleta staff and they agreed they should be taking care of these orders.

## 2022-04-12 ENCOUNTER — TELEPHONE (OUTPATIENT)
Dept: FAMILY MEDICINE CLINIC | Age: 87
End: 2022-04-12

## 2022-04-12 NOTE — TELEPHONE ENCOUNTER
Kulwant Almonte from home care called to inform skilled nursing has been started for patient beginning today 04/12/22, CHF care and Diabetes care.    Message routed for informational purposes

## 2022-04-20 ENCOUNTER — OFFICE VISIT (OUTPATIENT)
Dept: ORTHOPEDIC SURGERY | Age: 87
End: 2022-04-20
Payer: MEDICARE

## 2022-04-20 VITALS — HEIGHT: 69 IN | TEMPERATURE: 98 F | WEIGHT: 177 LBS | BODY MASS INDEX: 26.22 KG/M2

## 2022-04-20 DIAGNOSIS — S72.115A CLOSED NONDISPLACED FRACTURE OF GREATER TROCHANTER OF LEFT FEMUR, INITIAL ENCOUNTER (HCC): Primary | ICD-10-CM

## 2022-04-20 PROCEDURE — 99213 OFFICE O/P EST LOW 20 MIN: CPT | Performed by: ORTHOPAEDIC SURGERY

## 2022-04-20 NOTE — PROGRESS NOTES
Chief Complaint:   Chief Complaint   Patient presents with    Hip Pain     Left Hip greater trochanter FX DOI 03/17/2022, walking with a walker at home. Fermin Wild is about 5 weeks post injury to his left hip area. This is nondisplaced greater trochanteric fracture around the hip replacement arthroplasty. He is doing pretty well. He walks with a walker and feels stable doing that. He does not feel stable without it. He does not have much discomfort in the left hip area. Allergies; medications; past medical, surgical, family, and social history; and problem list have been reviewed today and updated as indicated in this encounter seen below. Exam: There is mild tenderness palpation over the greater trochanter of the left hip. Range of motion of the hip is fairly good with no pain or instability. Radiographs: Previous imaging was reviewed and showed the nondisplaced nature and the stability of the hip replacement arthroplasty. ASSESSMENT:    Aminah Barber was seen today for hip pain. Diagnoses and all orders for this visit:    Closed nondisplaced fracture of greater trochanter of left femur, initial encounter ([facility]        PLAN: Continue with walker ambulation to to stay safe. Is daughter gives additional history that he has had several falls and he needs to be particular careful in the yard as well. We will follow-up on an as-needed basis. Return if symptoms worsen or fail to improve.        Current Outpatient Medications   Medication Sig Dispense Refill    pravastatin (PRAVACHOL) 40 MG tablet Take 1 tablet by mouth at bedtime Note increase in dose 90 tablet 1    ramipril (ALTACE) 2.5 MG capsule Take 1 capsule by mouth daily 90 capsule 1    apixaban (ELIQUIS) 5 MG TABS tablet Take 1 tablet by mouth 2 times daily 180 tablet 1    furosemide (LASIX) 20 MG tablet Take 1 tablet by mouth daily 30 tablet 1    glipiZIDE (GLUCOTROL XL) 2.5 MG extended release tablet Take 1 tablet by mouth daily 90 tablet 1    brimonidine-timolol (COMBIGAN) 0.2-0.5 % ophthalmic solution Place 1 drop into the right eye every 12 hours. 1 Bottle 1     No current facility-administered medications for this visit. Patient Active Problem List   Diagnosis    Essential hypertension    Inflammatory arthritis    Paroxysmal A-fib ([address]    Mixed hyperlipidemia    Pacemaker    Type 2 diabetes mellitus with stage 3b chronic kidney disease, without long-term current use of insulin ([facility])    Ichthyosis    Hip fracture requiring operative repair, left, closed, initial encounter ([address]    Stage 3 chronic kidney disease ([address]       Past Medical History:   Diagnosis Date    A-fib ([address]     Anticoagulant long-term use     Atrial fibrillation ([address]     BPH (benign prostatic hyperplasia)     CAD (coronary artery disease)     Cataract of left eye 3/11/2014    Cerebrovascular disease     CHF (congestive heart failure) ([address]     Diabetes mellitus ([address]     Dislocated IOL (intraocular lens), posterior 11/15/2013    Hearing loss     Hyperlipidemia     Hypertension     Osteoarthritis     Peripheral vascular disease ([address]     Renal cyst     Right cataract 11/12/2013    Type 2 diabetes mellitus with stage 3b chronic kidney disease, without long-term current use of insulin ([address] 2/22/2022    Type 2 diabetes mellitus without complication ([address]     Unspecified cerebral artery occlusion with cerebral infarction 10/2010    mild stroke       Past Surgical History:   Procedure Laterality Date    CATARACT REMOVAL WITH IMPLANT Right 11 12 2013    CATARACT REMOVAL WITH IMPLANT Left 03/11/13    EYE SURGERY Right 11/15/13    dislocated intraocular lens    JOINT REPLACEMENT      left hip       Allergies   Allergen Reactions    Lipitor [Atorvastatin Calcium] Other (See Comments)     Muscle cramps    Vytorin [Ezetimibe-Simvastatin]      Leg Cramps         Social History     Socioeconomic History    Marital status:   Spouse name: None    Number of children: None    Years of education: None    Highest education level: None   Occupational History    None   Tobacco Use    Smoking status: Former Smoker     Packs/day: 1.00     Years: 30.00     Pack years: 30.00     Quit date: 3/17/1998     Years since quittin.1    Smokeless tobacco: Former User     Quit date: 2015   Vaping Use    Vaping Use: Never used   Substance and Sexual Activity    Alcohol use: Yes     Comment: 2 beers daily    Drug use: No    Sexual activity: None   Other Topics Concern    None   Social History Narrative    None     Social Determinants of Health     Financial Resource Strain: Low Risk     Difficulty of Paying Living Expenses: Not hard at all   Food Insecurity: No Food Insecurity    Worried About Running Out of Food in the Last Year: Never true    Shayy of Food in the Last Year: Never true   Transportation Needs: No Transportation Needs    Lack of Transportation (Medical): No    Lack of Transportation (Non-Medical):  No   Physical Activity: Unknown    Days of Exercise per Week: 0 days    Minutes of Exercise per Session: Not on file   Stress:     Feeling of Stress : Not on file   Social Connections:     Frequency of Communication with Friends and Family: Not on file    Frequency of Social Gatherings with Friends and Family: Not on file    Attends Alevism Services: Not on file    Active Member of Clubs or Organizations: Not on file    Attends Club or Organization Meetings: Not on file    Marital Status: Not on file   Intimate Partner Violence: Not At Risk    Fear of Current or Ex-Partner: No    Emotionally Abused: No    Physically Abused: No    Sexually Abused: No   Housing Stability: Unknown    Unable to Pay for Housing in the Last Year: No    Number of [place] in the Last Year: Not on file    Unstable Housing in the Last Year: No       Review of Systems  As follows except as previously noted in HPI:  Constitutional: Negative for chills, diaphoresis, fatigue, fever and unexpected weight change. Respiratory: Negative for cough, shortness of breath and wheezing. Cardiovascular: Negative for chest pain and palpitations. Neurological: Negative for dizziness, syncope, cephalgia. GI / : negative  Musculoskeletal: see HPI       Objective:   Physical Exam   Constitutional: Oriented to person, place, and time. and appears well-developed and well-nourished. :   Head: Normocephalic and atraumatic. Eyes: EOM are normal.   Neck: Neck supple. Cardiovascular: Normal rate and regular rhythm. Pulmonary/Chest: Effort normal. No stridor. No respiratory distress, no wheezes. Abdominal:  No abnormal distension. Neurological: Alert and oriented to person, place, and time. Skin: Skin is warm and dry. Psychiatric: Normal mood and affect.  Behavior is normal. Thought content normal.    BE Nicole,     4/20/22  9:13 AM

## 2022-04-21 ENCOUNTER — TELEPHONE (OUTPATIENT)
Dept: FAMILY MEDICINE CLINIC | Age: 87
End: 2022-04-21

## 2022-04-21 DIAGNOSIS — L03.115 BILATERAL CELLULITIS OF LOWER LEG: Primary | ICD-10-CM

## 2022-04-21 DIAGNOSIS — L03.116 BILATERAL CELLULITIS OF LOWER LEG: Primary | ICD-10-CM

## 2022-04-21 NOTE — TELEPHONE ENCOUNTER
Red Morris states he is taking his lasix, he urinated 3 times while she was there,  elevating for 20min every hour. She feels it could be the beginning of cellulitis. She'd like to catch this in the early stages if it is, in fact, cellulitis. Compression? Referral to [address] [place] [address]? Please advise. Thank you.

## 2022-04-21 NOTE — TELEPHONE ENCOUNTER
Shady Arana, [address] at [facility] today. Reports BLE edema, developing blisters with drainage. Right extremity measures 31.5 with 2+ edema, left leg measures 27 with 1+ edema. She applied calcium alginate and DSD to BLE. Reports patient also has 0.5 x 1 x 0.1 open area on left buttock. DSD applied. Please advise. Thank you.

## 2022-04-21 NOTE — TELEPHONE ENCOUNTER
Is he still taking the lasix? Continue current wound care and if worsening let me know. Does it look like cellulitis?

## 2022-04-22 RX ORDER — CEPHALEXIN 500 MG/1
500 CAPSULE ORAL 2 TIMES DAILY
Qty: 20 CAPSULE | Refills: 0 | Status: SHIPPED
Start: 2022-04-22 | End: 2022-06-14 | Stop reason: SDUPTHER

## 2022-04-22 NOTE — TELEPHONE ENCOUNTER
RX sent to the pharmacy and yes she can start compression wraps or he can go to the wound center for treatment if needed  keep me posted. Also let Dr Valentino Todd know as he just had surgery.

## 2022-04-25 NOTE — TELEPHONE ENCOUNTER
Patricia Serrano is asking if you can put an order in for tubigrips, OR if a verbal is ok, she will fax an order over to be signed. Please advise. Thank you.

## 2022-05-13 LAB
ALBUMIN SERPL-MCNC: 4 G/DL (ref 3.5–5.2)
ALP BLD-CCNC: 97 U/L (ref 40–129)
ALT SERPL-CCNC: 7 U/L (ref 0–40)
ANION GAP SERPL CALCULATED.3IONS-SCNC: 12 MMOL/L (ref 7–16)
AST SERPL-CCNC: 18 U/L (ref 0–39)
BASOPHILS ABSOLUTE: 0.08 E9/L (ref 0–0.2)
BASOPHILS RELATIVE PERCENT: 0.9 % (ref 0–2)
BILIRUB SERPL-MCNC: 0.7 MG/DL (ref 0–1.2)
BUN BLDV-MCNC: 33 MG/DL (ref 6–23)
C-REACTIVE PROTEIN: 1.6 MG/DL (ref 0–0.4)
CALCIUM SERPL-MCNC: 10 MG/DL (ref 8.6–10.2)
CHLORIDE BLD-SCNC: 103 MMOL/L (ref 98–107)
CHOLESTEROL, TOTAL: 201 MG/DL (ref 0–199)
CO2: 24 MMOL/L (ref 22–29)
CREAT SERPL-MCNC: 1.3 MG/DL (ref 0.7–1.2)
EOSINOPHILS ABSOLUTE: 0.28 E9/L (ref 0.05–0.5)
EOSINOPHILS RELATIVE PERCENT: 3.1 % (ref 0–6)
GFR AFRICAN AMERICAN: >60
GFR NON-AFRICAN AMERICAN: 52 ML/MIN/1.73
GLUCOSE BLD-MCNC: 120 MG/DL (ref 74–99)
HBA1C MFR BLD: 6.6 % (ref 4–5.6)
HCT VFR BLD CALC: 40.8 % (ref 37–54)
HDLC SERPL-MCNC: 55 MG/DL
HEMOGLOBIN: 13.3 G/DL (ref 12.5–16.5)
IMMATURE GRANULOCYTES #: 0.1 E9/L
IMMATURE GRANULOCYTES %: 1.1 % (ref 0–5)
LDL CHOLESTEROL CALCULATED: 132 MG/DL (ref 0–99)
LYMPHOCYTES ABSOLUTE: 1.14 E9/L (ref 1.5–4)
LYMPHOCYTES RELATIVE PERCENT: 12.6 % (ref 20–42)
MCH RBC QN AUTO: 31.6 PG (ref 26–35)
MCHC RBC AUTO-ENTMCNC: 32.6 % (ref 32–34.5)
MCV RBC AUTO: 96.9 FL (ref 80–99.9)
MONOCYTES ABSOLUTE: 0.93 E9/L (ref 0.1–0.95)
MONOCYTES RELATIVE PERCENT: 10.3 % (ref 2–12)
NEUTROPHILS ABSOLUTE: 6.49 E9/L (ref 1.8–7.3)
NEUTROPHILS RELATIVE PERCENT: 72 % (ref 43–80)
PDW BLD-RTO: 14.3 FL (ref 11.5–15)
PLATELET # BLD: 274 E9/L (ref 130–450)
PMV BLD AUTO: 10.2 FL (ref 7–12)
POTASSIUM SERPL-SCNC: 4.4 MMOL/L (ref 3.5–5)
RBC # BLD: 4.21 E12/L (ref 3.8–5.8)
RHEUMATOID FACTOR: <10 IU/ML (ref 0–13)
SEDIMENTATION RATE, ERYTHROCYTE: 67 MM/HR (ref 0–15)
SODIUM BLD-SCNC: 139 MMOL/L (ref 132–146)
T4 TOTAL: 6.3 MCG/DL (ref 4.5–11.7)
TOTAL PROTEIN: 7.4 G/DL (ref 6.4–8.3)
TRIGL SERPL-MCNC: 69 MG/DL (ref 0–149)
TSH SERPL DL<=0.05 MIU/L-ACNC: 3.51 UIU/ML (ref 0.27–4.2)
VLDLC SERPL CALC-MCNC: 14 MG/DL
WBC # BLD: 9 E9/L (ref 4.5–11.5)

## 2022-06-02 ENCOUNTER — TELEPHONE (OUTPATIENT)
Dept: FAMILY MEDICINE CLINIC | Age: 87
End: 2022-06-02

## 2022-06-13 ENCOUNTER — OFFICE VISIT (OUTPATIENT)
Dept: FAMILY MEDICINE CLINIC | Age: 87
End: 2022-06-13
Payer: MEDICARE

## 2022-06-13 VITALS
WEIGHT: 187.7 LBS | RESPIRATION RATE: 16 BRPM | DIASTOLIC BLOOD PRESSURE: 70 MMHG | OXYGEN SATURATION: 98 % | HEART RATE: 63 BPM | TEMPERATURE: 97.9 F | HEIGHT: 69 IN | BODY MASS INDEX: 27.8 KG/M2 | SYSTOLIC BLOOD PRESSURE: 120 MMHG

## 2022-06-13 DIAGNOSIS — I48.0 PAROXYSMAL A-FIB (HCC): ICD-10-CM

## 2022-06-13 DIAGNOSIS — E78.2 MIXED HYPERLIPIDEMIA: ICD-10-CM

## 2022-06-13 DIAGNOSIS — R60.0 BILATERAL LEG EDEMA: Primary | ICD-10-CM

## 2022-06-13 DIAGNOSIS — I10 ESSENTIAL HYPERTENSION: ICD-10-CM

## 2022-06-13 DIAGNOSIS — N18.32 TYPE 2 DIABETES MELLITUS WITH STAGE 3B CHRONIC KIDNEY DISEASE, WITHOUT LONG-TERM CURRENT USE OF INSULIN (HCC): ICD-10-CM

## 2022-06-13 DIAGNOSIS — E11.22 TYPE 2 DIABETES MELLITUS WITH STAGE 3B CHRONIC KIDNEY DISEASE, WITHOUT LONG-TERM CURRENT USE OF INSULIN (HCC): ICD-10-CM

## 2022-06-13 LAB
CHP ED QC CHECK: NORMAL
GLUCOSE BLD-MCNC: 102 MG/DL
HBA1C MFR BLD: 6.6 %

## 2022-06-13 PROCEDURE — 3044F HG A1C LEVEL LT 7.0%: CPT | Performed by: NURSE PRACTITIONER

## 2022-06-13 PROCEDURE — 99214 OFFICE O/P EST MOD 30 MIN: CPT | Performed by: NURSE PRACTITIONER

## 2022-06-13 PROCEDURE — 83036 HEMOGLOBIN GLYCOSYLATED A1C: CPT | Performed by: NURSE PRACTITIONER

## 2022-06-13 PROCEDURE — 82962 GLUCOSE BLOOD TEST: CPT | Performed by: NURSE PRACTITIONER

## 2022-06-13 PROCEDURE — 1123F ACP DISCUSS/DSCN MKR DOCD: CPT | Performed by: NURSE PRACTITIONER

## 2022-06-13 RX ORDER — GLIPIZIDE 2.5 MG/1
2.5 TABLET, EXTENDED RELEASE ORAL DAILY
Qty: 90 TABLET | Refills: 1 | Status: ON HOLD
Start: 2022-06-13 | End: 2022-08-27 | Stop reason: HOSPADM

## 2022-06-13 RX ORDER — RAMIPRIL 2.5 MG/1
2.5 CAPSULE ORAL DAILY
Qty: 90 CAPSULE | Refills: 1 | Status: SHIPPED | OUTPATIENT
Start: 2022-06-13 | End: 2022-09-12

## 2022-06-13 RX ORDER — FUROSEMIDE 20 MG/1
20 TABLET ORAL DAILY
Qty: 90 TABLET | Refills: 1 | Status: SHIPPED | OUTPATIENT
Start: 2022-06-13

## 2022-06-13 RX ORDER — PRAVASTATIN SODIUM 40 MG
40 TABLET ORAL NIGHTLY
Qty: 90 TABLET | Refills: 1 | Status: SHIPPED | OUTPATIENT
Start: 2022-06-13

## 2022-06-13 ASSESSMENT — PATIENT HEALTH QUESTIONNAIRE - PHQ9
SUM OF ALL RESPONSES TO PHQ9 QUESTIONS 1 & 2: 0
SUM OF ALL RESPONSES TO PHQ QUESTIONS 1-9: 0
SUM OF ALL RESPONSES TO PHQ QUESTIONS 1-9: 0
2. FEELING DOWN, DEPRESSED OR HOPELESS: 0
SUM OF ALL RESPONSES TO PHQ QUESTIONS 1-9: 0
SUM OF ALL RESPONSES TO PHQ QUESTIONS 1-9: 0
1. LITTLE INTEREST OR PLEASURE IN DOING THINGS: 0

## 2022-06-13 ASSESSMENT — ENCOUNTER SYMPTOMS
DIARRHEA: 0
ABDOMINAL PAIN: 0
NAUSEA: 0
CONSTIPATION: 0
WHEEZING: 0
VOMITING: 0
COLOR CHANGE: 0
COUGH: 0
SHORTNESS OF BREATH: 0

## 2022-06-13 NOTE — ASSESSMENT & PLAN NOTE
well controlled, no significant medication side effects noted and continue ramipril 2.5 mg daily, lasix 20 mg daily, CMP, CBCD, lipids reviewed today  -Discussed taking medication and directed every day. -Discussed exercising daily 30 minutes 5 times a week for 150 minutes weekly.  -Discussed weight reduction if needed.  -Discussed low sodium diet.  -Discussed limiting caffeine consumption and tobacco cessation and the effects they have on the heart and blood pressure.

## 2022-06-13 NOTE — ASSESSMENT & PLAN NOTE
well controlled, no significant medication side effects noted and A1c 6.8% continue glipizide XL 2.5 mg daily  Monitor BS at different times: 1 day fasting, then next day 2 hours after lunch, next day 2 hours after dinner, next day at bedtime then start over and log all values.   Bring log to next appointment  Foot exam every day; wash and dry well between toes, look for any redness, cracks, wounds notify provider if any problems occur  Reminder for annual Eye exam  Reminder for Podiatry visits Q 2 Months for toenail care if needed  Reminder to keep vaccines up dated  Exercise 30 minutes daily  Recommend Diabetic Education Classes if you have not already attended

## 2022-06-13 NOTE — PROGRESS NOTES
OFFICE PROGRESS NOTE  [address]  [address] [facility] [address]  Dept: [phone]   Chief Complaint   Patient presents with    Leg Swelling     with sores on both legs    Diabetes       ASSESSMENT/PLAN   1. Bilateral leg edema  Assessment & Plan:   Referral to [facility] for wound care and leg wraps  Orders:  -     [facility]  2. Type 2 diabetes mellitus with stage 3b chronic kidney disease, without long-term current use of insulin ([facility])  Assessment & Plan:   well controlled, no significant medication side effects noted and A1c 6.8% continue glipizide XL 2.5 mg daily  Monitor BS at different times: 1 day fasting, then next day 2 hours after lunch, next day 2 hours after dinner, next day at bedtime then start over and log all values. Bring log to next appointment  Foot exam every day; wash and dry well between toes, look for any redness, cracks, wounds notify provider if any problems occur  Reminder for annual Eye exam  Reminder for Podiatry visits Q 2 Months for toenail care if needed  Reminder to keep vaccines up dated  Exercise 30 minutes daily  Recommend Diabetic Education Classes if you have not already attended  Orders:  -     POCT glycosylated hemoglobin (Hb A1C)  -     POCT Glucose  -     glipiZIDE (GLUCOTROL XL) 2.5 MG extended release tablet; Take 1 tablet by mouth daily, Disp-90 tablet, R-1Normal  3. Essential hypertension  Assessment & Plan:   well controlled, no significant medication side effects noted and continue ramipril 2.5 mg daily, lasix 20 mg daily, CMP, CBCD, lipids reviewed today  -Discussed taking medication and directed every day. -Discussed exercising daily 30 minutes 5 times a week for 150 minutes weekly.  -Discussed weight reduction if needed.  -Discussed low sodium diet.  -Discussed limiting caffeine consumption and tobacco cessation and the effects they have on the heart and blood pressure.    Orders:  -     ramipril (ALTACE) 2.5 MG capsule; Take 1 capsule by mouth daily, Disp-90 capsule, R-1Normal  -     furosemide (LASIX) 20 MG tablet; Take 1 tablet by mouth daily, Disp-90 tablet, R-1Normal  4. Paroxysmal A-fib [facility])  Assessment & Plan:   Well controlled on the Eliquis. No adverse events, no bleeding  Orders:  -     apixaban (ELIQUIS) 5 MG TABS tablet; Take 1 tablet by mouth 2 times daily, Disp-180 tablet, R-1Normal  5. Mixed hyperlipidemia  Assessment & Plan:   no significant medication side effects noted, poorly controlled and declines to adjust stating, continue pravastatin 40 mg nightly  -Discussed low fat diet, limit fast food, goodies, breads and pastas if consuming several days a week,  limit any alcohol consumption.  -Discussed weight reduction and exercise 30 minutes 5 days a week for total of 150 minutes weekly.  -Discussed if any unusual muscle aching/pain to contact the office, discussed medication and risk of muscle pain/damage from Rhabdomyolysis. Orders:  -     pravastatin (PRAVACHOL) 40 MG tablet; Take 1 tablet by mouth at bedtime Note increase in dose, Disp-90 tablet, R-1Normal       Reviewed labs: CMP, CBCD, Lipids, TSH, FT4, vitamin D      Discussed reducing caffeine intake, Discussed smoking cessation, Discussed weight loss, Discussed exercising 30 minutes daily, Discussed limit alcohol to one drink per day and Discussed taking medications as directed and adverse effects    Return in about 3 months (around 9/13/2022) for medicare well visit. HPI:     He started with leg swelling in the right leg again 3 days ago and has been off and on, states he didn't take the lasix one day then the leg swelling. Started again. He has blisters on both lower legs. Will have home health return to wrap the legs. 80 y.o. male presents today for follow-up of diabetes [place].   In-office bloodsugar is:   Results for POC orders placed in visit on 06/13/22   POCT glycosylated hemoglobin (Hb A1C)   Result Value Ref Hyperlipidemia: Patient presents with hyperlipidemia. He was tested because DM, HTN, hyperlipidemia. His last labs 5/13/22  showed Total cholesterol of 201, HDL 55, ,  Triglycerides 69. He complains of ankle swelling after he is up during the day. Denies chest pain, dyspnea, exertional chest pressure/discomfort, fatigue, feeding intolerance, palpitations, poor exercise tolerance, syncope, tachypnea and skin xanthelasma. There is not a family history of hyperlipidemia. There is not a family history of early ischemia heart disease.   Lab Results   Component Value Date    CHOL 201 (H) 05/13/2022    CHOL 208 (H) 02/23/2022    CHOL 229 (H) 11/02/2021     Lab Results   Component Value Date    TRIG 69 05/13/2022    TRIG 70 02/23/2022    TRIG 83 11/02/2021     Lab Results   Component Value Date    HDL 55 05/13/2022    HDL 53 02/23/2022    HDL 57 11/02/2021     Lab Results   Component Value Date    LDLCALC 132 (H) 05/13/2022    LDLCALC 141 (H) 02/23/2022    LDLCALC 155 (H) 11/02/2021     Lab Results   Component Value Date    LABVLDL 14 05/13/2022    LABVLDL 14 02/23/2022    LABVLDL 17 11/02/2021    VLDL 14 03/23/2020    VLDL 14 03/23/2020    VLDL 10 09/09/2019     Lab Results   Component Value Date    CHOLHDLRATIO 3.4 03/23/2020    CHOLHDLRATIO 3.4 03/23/2020    CHOLHDLRATIO 2.6 09/09/2019         Today's vital signs are as follows:  /70   Pulse 63   Temp 97.9 °F (36.6 °C)   Resp 16   Ht 5' 9\" (1.753 m)   Wt 187 lb 11.2 oz (85.1 kg)   SpO2 98%   BMI 27.72 kg/m²     Lab Results   Component Value Date    LABA1C 6.6 06/13/2022           Current Outpatient Medications:     pravastatin (PRAVACHOL) 40 MG tablet, Take 1 tablet by mouth at bedtime Note increase in dose, Disp: 90 tablet, Rfl: 1    ramipril (ALTACE) 2.5 MG capsule, Take 1 capsule by mouth daily, Disp: 90 capsule, Rfl: 1    apixaban (ELIQUIS) 5 MG TABS tablet, Take 1 tablet by mouth 2 times daily, Disp: 180 tablet, Rfl: 1    furosemide (LASIX) 20 MG tablet, Take 1 tablet by mouth daily, Disp: 90 tablet, Rfl: 1    glipiZIDE (GLUCOTROL XL) 2.5 MG extended release tablet, Take 1 tablet by mouth daily, Disp: 90 tablet, Rfl: 1    brimonidine-timolol (COMBIGAN) 0.2-0.5 % ophthalmic solution, Place 1 drop into the right eye every 12 hours. , Disp: 1 Bottle, Rfl: 1      Surgical History:  has a past surgical history that includes joint replacement; Cataract removal with implant (Right, 11 12 2013); eye surgery (Right, 11/15/13); and Cataract removal with implant (Left, 03/11/13). Social History:  reports that he quit smoking about 24 years ago. He has a 30.00 pack-year smoking history. He quit smokeless tobacco use about 6 years ago. He reports current alcohol use. He reports that he does not use drugs. Family History: family history includes Asthma in his mother; High Blood Pressure in his mother; Stroke in his mother. I have reviewed Omar's allergies, medications, problem list, medical, social and family history and have updated as needed in the electronic medical record    Review of Systems   Constitutional: Negative for activity change, appetite change, chills, diaphoresis, fatigue, fever and unexpected weight change. Respiratory: Negative for cough, shortness of breath and wheezing. Cardiovascular: Positive for leg swelling. Negative for chest pain and palpitations. Gastrointestinal: Negative for abdominal pain, constipation, diarrhea, nausea and vomiting. Skin: Positive for rash and wound. Negative for color change and pallor.        OBJECTIVE:     VS:  Wt Readings from Last 3 Encounters:   06/13/22 187 lb 11.2 oz (85.1 kg)   04/20/22 177 lb (80.3 kg)   03/30/22 177 lb (80.3 kg)                       Vitals:    06/13/22 0913   BP: 120/70   Pulse: 63   Resp: 16   Temp: 97.9 °F (36.6 °C)   SpO2: 98%   Weight: 187 lb 11.2 oz (85.1 kg)   Height: 5' 9\" (1.753 m)       General: Alert and oriented to person, place, and time, well developed and well nourished, in no acute distress  SKIN: Warm and dry, several blisters noted on both lower legs, left worse than the right,  no sign of cellulitis at this time. HEAD: normocephalic, atraumatic  Eyes: sclera/conjunctiva clear, PERRLA, EOMI's intact  ENT: tympanic membranes, external ear and ear canal normal bilaterally, hard of hearing, Nose without deformity, nasal mucosa and turbinates normal without polyps   Throat: clear, tongue midline, tonsils 1+, drainage, no masses or lesions noted, good dentition  Neck: supple and non-tender without mass, trachea midline, no cervical lymphadenopathy, no bruit, no thyromegaly or nodules  Cardiovascular: regular rate and regular rhythm, normal S1 and S2,  no murmurs, rubs, clicks, or gallop. Distal pulses intact, no carotid bruits. 2 + pitting edema right lower leg to foot, 1+ pitting left foot,  Pulmonary/Chest: clear to auscultation bilaterally, no wheezes, rales or rhonchi, normal air movement, no respiratory distress  Abdomen: soft, non-tender, non-distended, normal bowel sounds, no masses or hepatosplenomegaly  Musculoskeletal: Normal ROM, no joint swelling, deformity or tenderness   Neurologic: sitting in WC today coordination and speech normal  Extremities: 2 + pitting edema right lower leg to foot, 1+ pitting left foot, no clubbing, cyanosis. Psychiatric: Good eye contact, normal mood and affect, answers questions appropriately    I have reviewed my findings and recommendations with Ronda Luu.     Eric Cruz, APRN - CNP, NP-C, FNP-BC

## 2022-06-14 ENCOUNTER — TELEPHONE (OUTPATIENT)
Dept: FAMILY MEDICINE CLINIC | Age: 87
End: 2022-06-14

## 2022-06-14 DIAGNOSIS — S80.821A: ICD-10-CM

## 2022-06-14 DIAGNOSIS — L03.116 BILATERAL CELLULITIS OF LOWER LEG: ICD-10-CM

## 2022-06-14 DIAGNOSIS — R60.0 BILATERAL LEG EDEMA: Primary | ICD-10-CM

## 2022-06-14 DIAGNOSIS — S80.822A BLISTER OF LEFT LOWER EXTREMITY WITHOUT INFECTION, INITIAL ENCOUNTER: ICD-10-CM

## 2022-06-14 DIAGNOSIS — L03.115 BILATERAL CELLULITIS OF LOWER LEG: ICD-10-CM

## 2022-06-14 RX ORDER — CEPHALEXIN 500 MG/1
500 CAPSULE ORAL 2 TIMES DAILY
Qty: 20 CAPSULE | Refills: 0 | Status: SHIPPED | OUTPATIENT
Start: 2022-06-14 | End: 2022-06-24

## 2022-06-14 NOTE — TELEPHONE ENCOUNTER
Patient thought he was going to get antibiotic for his legs at his O/V from 6.13.22. Please advise.     VINICIO/Pearl    Last seen 6/13/2022  Next appt 8/1/2022

## 2022-06-14 NOTE — TELEPHONE ENCOUNTER
Thank you. Patient stated he will see another home health agency, but he does not know of any. Please advise.

## 2022-06-14 NOTE — TELEPHONE ENCOUNTER
RX sent never went through, what does he want to do about his legs see podiatry or try another home health agency since [facility] has no availability

## 2022-06-15 NOTE — TELEPHONE ENCOUNTER
Referral sent to [facility] care if they don't have availability then he will have to see podiatry

## 2022-06-22 DIAGNOSIS — I48.0 PAROXYSMAL A-FIB (HCC): ICD-10-CM

## 2022-08-01 ENCOUNTER — OFFICE VISIT (OUTPATIENT)
Dept: FAMILY MEDICINE CLINIC | Age: 87
End: 2022-08-01
Payer: MEDICARE

## 2022-08-01 VITALS
SYSTOLIC BLOOD PRESSURE: 120 MMHG | BODY MASS INDEX: 25.33 KG/M2 | WEIGHT: 171 LBS | OXYGEN SATURATION: 94 % | RESPIRATION RATE: 16 BRPM | HEART RATE: 64 BPM | HEIGHT: 69 IN | DIASTOLIC BLOOD PRESSURE: 74 MMHG | TEMPERATURE: 97.5 F

## 2022-08-01 DIAGNOSIS — Z00.00 INITIAL MEDICARE ANNUAL WELLNESS VISIT: Primary | ICD-10-CM

## 2022-08-01 DIAGNOSIS — H93.90 EAR LESION: ICD-10-CM

## 2022-08-01 DIAGNOSIS — Z28.21 VACCINATION DECLINED BY PATIENT: ICD-10-CM

## 2022-08-01 PROCEDURE — G0439 PPPS, SUBSEQ VISIT: HCPCS | Performed by: NURSE PRACTITIONER

## 2022-08-01 PROCEDURE — 1123F ACP DISCUSS/DSCN MKR DOCD: CPT | Performed by: NURSE PRACTITIONER

## 2022-08-01 ASSESSMENT — PATIENT HEALTH QUESTIONNAIRE - PHQ9
SUM OF ALL RESPONSES TO PHQ QUESTIONS 1-9: 0
1. LITTLE INTEREST OR PLEASURE IN DOING THINGS: 0
SUM OF ALL RESPONSES TO PHQ QUESTIONS 1-9: 0
2. FEELING DOWN, DEPRESSED OR HOPELESS: 0
SUM OF ALL RESPONSES TO PHQ QUESTIONS 1-9: 0
SUM OF ALL RESPONSES TO PHQ QUESTIONS 1-9: 0
SUM OF ALL RESPONSES TO PHQ9 QUESTIONS 1 & 2: 0

## 2022-08-01 NOTE — PROGRESS NOTES
Medicare Annual Wellness Visit    Vivian Redd is here for Medicare AWV and Health Maintenance (Due for tdap, shingles, pneu )    Assessment & Plan   Initial Medicare annual wellness visit  Vaccination declined by patient  Assessment & Plan:   Declines all vaccines today Pneumonia, Tdap, shingles and Covid  Ear lesion  Assessment & Plan:   New problem will see ENT for further evaluation. Has upcoming appointment. Recommendations for Preventive Services Due: see orders and patient instructions/AVS.  Recommended screening schedule for the next 5-10 years is provided to the patient in written form: see Patient Instructions/AVS.     Return for keep appt for August 23 and then Medicare Annual Wellness Visit in 1 year. Subjective   The following acute and/or chronic problems were also addressed today:  Lesion on the right posterior ear has upcoming appt with ENT. Patient's complete Health Risk Assessment and screening values have been reviewed and are found in Flowsheets. The following problems were reviewed today and where indicated follow up appointments were made and/or referrals ordered. Positive Risk Factor Screenings with Interventions:    Fall Risk:  Do you feel unsteady or are you worried about falling? : no  2 or more falls in past year?: no  Fall with injury in past year?: (!) yes   Fall Risk Interventions:    Home safety tips provided  Home exercises provided to promote strength and balance  He did have fractured hip and is exercising at home now, he was in rehab for a while in the spring. Cognitive:   Words recalled: 2 Words Recalled  Clock Drawing Test (CDT): (!) Abnormal  Total Score Interpretation: Abnormal Mini-Cog  Cognitive Impairment Interventions:  Clock drawing is normal           General Health and ACP:  General  In general, how would you say your health is?: Excellent  In the past 7 days, have you experienced any of the following: New or Increased Pain, New or Increased Fatigue, Loneliness, Social Isolation, Stress or Anger?: No  Do you get the social and emotional support that you need?: (!) No  Do you have a Living Will?: (!) No    Advance Directives       Power of [facility] [place] Will ACP-Advance Directive ACP-Power of     Not on File Not on File Not on File Not on File        General Health Risk Interventions:  Social isolation: patient states he has adequate support at home. No Living Will: Advance Care Planning addressed with patient today    Health Habits/Nutrition:  Physical Activity: Sufficiently Active    Days of Exercise per Week: 7 days    Minutes of Exercise per Session: 30 min     Have you lost any weight without trying in the past 3 months?: No  Body mass index: (!) 25.25  Have you seen the dentist within the past year?: (!) No  Health Habits/Nutrition Interventions:  Dental exam overdue:  patient declines dental evaluation             Objective   Vitals:    08/01/22 1101   BP: 120/74   Pulse: 64   Resp: 16   Temp: 97.5 °F (36.4 °C)   SpO2: 94%   Weight: 171 lb (77.6 kg)   Height: 5' 9\" (1.753 m)      Body mass index is 25.25 kg/m². General Appearance: alert and oriented to person, place and time, well developed and well- nourished, in no acute distress  Skin: warm and dry, lesion on the right posterior auricle 1.5 cm x 1.5 cm irregular lesion, wound dressing to the left lower leg dry and intact home health nurse changed dressing today. Head: normocephalic and atraumatic  Eyes: pupils equal, round, and reactive to light, extraocular eye movements intact, conjunctivae normal  ENT: tympanic membrane, external ear and ear canal normal bilaterally, nose without deformity, nasal mucosa and turbinates normal without polyps, very hard of hearing. He has upcoming appt for ENT.    Neck: supple and non-tender without mass, no thyromegaly or thyroid nodules, no cervical lymphadenopathy  Pulmonary/Chest: clear to auscultation bilaterally- no wheezes, rales or rhonchi, affects decision-making abilities. Also discussed the patients long-term treatment options. Reviewed with the patient the appropriate state-specific advance directive documents. Reviewed the process of designating a competent adult as an Agent (or -in-fact) that could take make health care decisions for the patient if incompetent. Patient was asked to complete the declaration forms, either acknowledge the forms by a public notary or an eligible witness and provide a signed copy to the practice office. Time spent (minutes): 15     Cardiovascular Disease Risk Counseling: Assessed the patient's risk to develop cardiovascular disease and reviewed main risk factors. Reviewed steps to reduce disease risk including:   Quitting tobacco use, reducing amount smoked, or not starting the habit  Making healthy food choices  Being physically active and gradualy increasing activity levels   Reduce weight and determine a healthy BMI goal  Monitor blood pressure and treat if higher than 140/90 mmHg  Maintain blood total cholesterol levels under 5 mmol/l or 190 mg/dl  Maintain LDL cholesterol levels under 3.0 mmol/l or 115 mg/dl   Control blood glucose levels  Consider taking aspirin (75 mg daily), once blood pressure is controlled   Provided a follow up plan.   Time spent (minutes): 10

## 2022-08-01 NOTE — PATIENT INSTRUCTIONS
Personalized Preventive Plan for Lucas Singh - 8/1/2022  Medicare offers a range of preventive health benefits. Some of the tests and screenings are paid in full while other may be subject to a deductible, co-insurance, and/or copay. Some of these benefits include a comprehensive review of your medical history including lifestyle, illnesses that may run in your family, and various assessments and screenings as appropriate. After reviewing your medical record and screening and assessments performed today your provider may have ordered immunizations, labs, imaging, and/or referrals for you. A list of these orders (if applicable) as well as your Preventive Care list are included within your After Visit Summary for your review. Other Preventive Recommendations:    A preventive eye exam performed by an eye specialist is recommended every 1-2 years to screen for glaucoma; cataracts, macular degeneration, and other eye disorders. A preventive dental visit is recommended every 6 months. Try to get at least 150 minutes of exercise per week or 10,000 steps per day on a pedometer . Order or download the FREE \"Exercise & Physical Activity: Your Everyday Guide\" from [employer]. Call 6-329.140.6813 or search [employer] online. You need 8790-8097 mg of calcium and 1247-0874 IU of vitamin D per day. It is possible to meet your calcium requirement with diet alone, but a vitamin D supplement is usually necessary to meet this goal.  When exposed to the sun, use a sunscreen that protects against both UVA and UVB radiation with an SPF of 30 or greater. Reapply every 2 to 3 hours or after sweating, drying off with a towel, or swimming. Always wear a seat belt when traveling in a car. Always wear a helmet when riding a bicycle or motorcycle.

## 2022-08-21 ENCOUNTER — APPOINTMENT (OUTPATIENT)
Dept: GENERAL RADIOLOGY | Age: 87
DRG: 871 | End: 2022-08-21
Payer: MEDICARE

## 2022-08-21 ENCOUNTER — APPOINTMENT (OUTPATIENT)
Dept: ULTRASOUND IMAGING | Age: 87
DRG: 871 | End: 2022-08-21
Payer: MEDICARE

## 2022-08-21 ENCOUNTER — HOSPITAL ENCOUNTER (INPATIENT)
Age: 87
LOS: 6 days | Discharge: HOME HEALTH | DRG: 871 | End: 2022-08-27
Attending: EMERGENCY MEDICINE | Admitting: INTERNAL MEDICINE
Payer: MEDICARE

## 2022-08-21 ENCOUNTER — APPOINTMENT (OUTPATIENT)
Dept: CT IMAGING | Age: 87
DRG: 871 | End: 2022-08-21
Payer: MEDICARE

## 2022-08-21 DIAGNOSIS — K92.0 GASTROINTESTINAL HEMORRHAGE WITH HEMATEMESIS: Primary | ICD-10-CM

## 2022-08-21 DIAGNOSIS — R77.8 ELEVATED TROPONIN: ICD-10-CM

## 2022-08-21 DIAGNOSIS — S80.821D: ICD-10-CM

## 2022-08-21 DIAGNOSIS — M62.82 NON-TRAUMATIC RHABDOMYOLYSIS: ICD-10-CM

## 2022-08-21 DIAGNOSIS — L03.115 CELLULITIS OF RIGHT LEG: ICD-10-CM

## 2022-08-21 DIAGNOSIS — N17.9 AKI (ACUTE KIDNEY INJURY) (HCC): ICD-10-CM

## 2022-08-21 DIAGNOSIS — M19.90 INFLAMMATORY ARTHRITIS: ICD-10-CM

## 2022-08-21 PROBLEM — K92.2 GIB (GASTROINTESTINAL BLEEDING): Status: ACTIVE | Noted: 2022-08-21

## 2022-08-21 LAB
ANION GAP SERPL CALCULATED.3IONS-SCNC: 11 MMOL/L (ref 7–16)
BACTERIA: ABNORMAL /HPF
BILIRUBIN URINE: ABNORMAL
BLOOD, URINE: ABNORMAL
BUN BLDV-MCNC: 33 MG/DL (ref 6–23)
CALCIUM SERPL-MCNC: 9.9 MG/DL (ref 8.6–10.2)
CHLORIDE BLD-SCNC: 104 MMOL/L (ref 98–107)
CLARITY: CLEAR
CO2: 24 MMOL/L (ref 22–29)
COLOR: YELLOW
CREAT SERPL-MCNC: 1.6 MG/DL (ref 0.7–1.2)
EPITHELIAL CELLS, UA: ABNORMAL /HPF
GFR AFRICAN AMERICAN: 50
GFR NON-AFRICAN AMERICAN: 41 ML/MIN/1.73
GLUCOSE BLD-MCNC: 137 MG/DL (ref 74–99)
GLUCOSE URINE: NEGATIVE MG/DL
HCT VFR BLD CALC: 43.9 % (ref 37–54)
HEMOGLOBIN: 14.3 G/DL (ref 12.5–16.5)
KETONES, URINE: 15 MG/DL
LEUKOCYTE ESTERASE, URINE: NEGATIVE
MCH RBC QN AUTO: 32.1 PG (ref 26–35)
MCHC RBC AUTO-ENTMCNC: 32.6 % (ref 32–34.5)
MCV RBC AUTO: 98.7 FL (ref 80–99.9)
METER GLUCOSE: 111 MG/DL (ref 74–99)
NITRITE, URINE: NEGATIVE
PDW BLD-RTO: 13.5 FL (ref 11.5–15)
PH UA: 5.5 (ref 5–9)
PLATELET # BLD: 239 E9/L (ref 130–450)
PMV BLD AUTO: 10.5 FL (ref 7–12)
POTASSIUM SERPL-SCNC: 4.5 MMOL/L (ref 3.5–5)
PROTEIN UA: 30 MG/DL
RBC # BLD: 4.45 E12/L (ref 3.8–5.8)
RBC UA: ABNORMAL /HPF (ref 0–2)
SARS-COV-2, NAAT: NOT DETECTED
SODIUM BLD-SCNC: 139 MMOL/L (ref 132–146)
SPECIFIC GRAVITY UA: 1.02 (ref 1–1.03)
TOTAL CK: 1362 U/L (ref 20–200)
TOTAL CK: 985 U/L (ref 20–200)
TROPONIN, HIGH SENSITIVITY: 122 NG/L (ref 0–11)
TROPONIN, HIGH SENSITIVITY: 145 NG/L (ref 0–11)
UROBILINOGEN, URINE: 0.2 E.U./DL
WBC # BLD: 27 E9/L (ref 4.5–11.5)
WBC UA: ABNORMAL /HPF (ref 0–5)

## 2022-08-21 PROCEDURE — 96374 THER/PROPH/DIAG INJ IV PUSH: CPT

## 2022-08-21 PROCEDURE — 82550 ASSAY OF CK (CPK): CPT

## 2022-08-21 PROCEDURE — C9113 INJ PANTOPRAZOLE SODIUM, VIA: HCPCS | Performed by: EMERGENCY MEDICINE

## 2022-08-21 PROCEDURE — 96375 TX/PRO/DX INJ NEW DRUG ADDON: CPT

## 2022-08-21 PROCEDURE — 84484 ASSAY OF TROPONIN QUANT: CPT

## 2022-08-21 PROCEDURE — 2580000003 HC RX 258: Performed by: INTERNAL MEDICINE

## 2022-08-21 PROCEDURE — 99285 EMERGENCY DEPT VISIT HI MDM: CPT

## 2022-08-21 PROCEDURE — 81001 URINALYSIS AUTO W/SCOPE: CPT

## 2022-08-21 PROCEDURE — 82962 GLUCOSE BLOOD TEST: CPT

## 2022-08-21 PROCEDURE — 80048 BASIC METABOLIC PNL TOTAL CA: CPT

## 2022-08-21 PROCEDURE — 2060000000 HC ICU INTERMEDIATE R&B

## 2022-08-21 PROCEDURE — 93971 EXTREMITY STUDY: CPT

## 2022-08-21 PROCEDURE — 85027 COMPLETE CBC AUTOMATED: CPT

## 2022-08-21 PROCEDURE — 36415 COLL VENOUS BLD VENIPUNCTURE: CPT

## 2022-08-21 PROCEDURE — 71045 X-RAY EXAM CHEST 1 VIEW: CPT

## 2022-08-21 PROCEDURE — 6360000002 HC RX W HCPCS: Performed by: INTERNAL MEDICINE

## 2022-08-21 PROCEDURE — 70450 CT HEAD/BRAIN W/O DYE: CPT

## 2022-08-21 PROCEDURE — 87635 SARS-COV-2 COVID-19 AMP PRB: CPT

## 2022-08-21 PROCEDURE — 2580000003 HC RX 258: Performed by: EMERGENCY MEDICINE

## 2022-08-21 PROCEDURE — 6360000002 HC RX W HCPCS: Performed by: EMERGENCY MEDICINE

## 2022-08-21 PROCEDURE — 73502 X-RAY EXAM HIP UNI 2-3 VIEWS: CPT

## 2022-08-21 PROCEDURE — 72125 CT NECK SPINE W/O DYE: CPT

## 2022-08-21 PROCEDURE — 93005 ELECTROCARDIOGRAM TRACING: CPT | Performed by: EMERGENCY MEDICINE

## 2022-08-21 PROCEDURE — 99223 1ST HOSP IP/OBS HIGH 75: CPT | Performed by: INTERNAL MEDICINE

## 2022-08-21 PROCEDURE — 6370000000 HC RX 637 (ALT 250 FOR IP): Performed by: INTERNAL MEDICINE

## 2022-08-21 RX ORDER — ONDANSETRON 4 MG/1
4 TABLET, ORALLY DISINTEGRATING ORAL EVERY 8 HOURS PRN
Status: DISCONTINUED | OUTPATIENT
Start: 2022-08-21 | End: 2022-08-27 | Stop reason: HOSPADM

## 2022-08-21 RX ORDER — ONDANSETRON 2 MG/ML
4 INJECTION INTRAMUSCULAR; INTRAVENOUS ONCE
Status: COMPLETED | OUTPATIENT
Start: 2022-08-21 | End: 2022-08-21

## 2022-08-21 RX ORDER — SODIUM CHLORIDE 0.9 % (FLUSH) 0.9 %
5-40 SYRINGE (ML) INJECTION EVERY 12 HOURS SCHEDULED
Status: DISCONTINUED | OUTPATIENT
Start: 2022-08-21 | End: 2022-08-27 | Stop reason: HOSPADM

## 2022-08-21 RX ORDER — ONDANSETRON 2 MG/ML
4 INJECTION INTRAMUSCULAR; INTRAVENOUS EVERY 6 HOURS PRN
Status: DISCONTINUED | OUTPATIENT
Start: 2022-08-21 | End: 2022-08-27 | Stop reason: HOSPADM

## 2022-08-21 RX ORDER — TIMOLOL MALEATE 5 MG/ML
1 SOLUTION/ DROPS OPHTHALMIC EVERY 12 HOURS
Status: DISCONTINUED | OUTPATIENT
Start: 2022-08-21 | End: 2022-08-27 | Stop reason: HOSPADM

## 2022-08-21 RX ORDER — BRIMONIDINE TARTRATE, TIMOLOL MALEATE 2; 5 MG/ML; MG/ML
1 SOLUTION/ DROPS OPHTHALMIC EVERY 12 HOURS
Status: DISCONTINUED | OUTPATIENT
Start: 2022-08-21 | End: 2022-08-21 | Stop reason: CLARIF

## 2022-08-21 RX ORDER — INSULIN LISPRO 100 [IU]/ML
0-4 INJECTION, SOLUTION INTRAVENOUS; SUBCUTANEOUS NIGHTLY
Status: DISCONTINUED | OUTPATIENT
Start: 2022-08-21 | End: 2022-08-25

## 2022-08-21 RX ORDER — PRAVASTATIN SODIUM 20 MG
40 TABLET ORAL NIGHTLY
Status: DISCONTINUED | OUTPATIENT
Start: 2022-08-21 | End: 2022-08-27 | Stop reason: HOSPADM

## 2022-08-21 RX ORDER — BRIMONIDINE TARTRATE 2 MG/ML
1 SOLUTION/ DROPS OPHTHALMIC EVERY 12 HOURS
Status: DISCONTINUED | OUTPATIENT
Start: 2022-08-21 | End: 2022-08-27 | Stop reason: HOSPADM

## 2022-08-21 RX ORDER — ACETAMINOPHEN 325 MG/1
650 TABLET ORAL EVERY 6 HOURS PRN
Status: DISCONTINUED | OUTPATIENT
Start: 2022-08-21 | End: 2022-08-27 | Stop reason: HOSPADM

## 2022-08-21 RX ORDER — ACETAMINOPHEN 650 MG/1
650 SUPPOSITORY RECTAL EVERY 6 HOURS PRN
Status: DISCONTINUED | OUTPATIENT
Start: 2022-08-21 | End: 2022-08-27 | Stop reason: HOSPADM

## 2022-08-21 RX ORDER — SODIUM CHLORIDE 9 MG/ML
INJECTION, SOLUTION INTRAVENOUS CONTINUOUS
Status: DISCONTINUED | OUTPATIENT
Start: 2022-08-21 | End: 2022-08-24

## 2022-08-21 RX ORDER — SODIUM CHLORIDE 9 MG/ML
INJECTION, SOLUTION INTRAVENOUS PRN
Status: DISCONTINUED | OUTPATIENT
Start: 2022-08-21 | End: 2022-08-27 | Stop reason: HOSPADM

## 2022-08-21 RX ORDER — 0.9 % SODIUM CHLORIDE 0.9 %
1000 INTRAVENOUS SOLUTION INTRAVENOUS ONCE
Status: COMPLETED | OUTPATIENT
Start: 2022-08-21 | End: 2022-08-21

## 2022-08-21 RX ORDER — SODIUM CHLORIDE 0.9 % (FLUSH) 0.9 %
5-40 SYRINGE (ML) INJECTION PRN
Status: DISCONTINUED | OUTPATIENT
Start: 2022-08-21 | End: 2022-08-27 | Stop reason: HOSPADM

## 2022-08-21 RX ORDER — INSULIN LISPRO 100 [IU]/ML
0-4 INJECTION, SOLUTION INTRAVENOUS; SUBCUTANEOUS
Status: DISCONTINUED | OUTPATIENT
Start: 2022-08-21 | End: 2022-08-25

## 2022-08-21 RX ORDER — POLYETHYLENE GLYCOL 3350 17 G/17G
17 POWDER, FOR SOLUTION ORAL DAILY PRN
Status: DISCONTINUED | OUTPATIENT
Start: 2022-08-21 | End: 2022-08-27 | Stop reason: HOSPADM

## 2022-08-21 RX ORDER — OCTREOTIDE ACETATE 50 UG/ML
50 INJECTION, SOLUTION INTRAVENOUS; SUBCUTANEOUS ONCE
Status: COMPLETED | OUTPATIENT
Start: 2022-08-21 | End: 2022-08-21

## 2022-08-21 RX ORDER — PANTOPRAZOLE SODIUM 40 MG/10ML
80 INJECTION, POWDER, LYOPHILIZED, FOR SOLUTION INTRAVENOUS ONCE
Status: COMPLETED | OUTPATIENT
Start: 2022-08-21 | End: 2022-08-21

## 2022-08-21 RX ADMIN — SODIUM CHLORIDE 1000 ML: 9 INJECTION, SOLUTION INTRAVENOUS at 16:16

## 2022-08-21 RX ADMIN — WATER 1000 MG: 1 INJECTION INTRAMUSCULAR; INTRAVENOUS; SUBCUTANEOUS at 16:16

## 2022-08-21 RX ADMIN — TIMOLOL MALEATE 1 DROP: 5 SOLUTION OPHTHALMIC at 21:23

## 2022-08-21 RX ADMIN — PRAVASTATIN SODIUM 40 MG: 20 TABLET ORAL at 21:28

## 2022-08-21 RX ADMIN — ONDANSETRON 4 MG: 2 INJECTION INTRAMUSCULAR; INTRAVENOUS at 17:49

## 2022-08-21 RX ADMIN — PANTOPRAZOLE SODIUM 80 MG: 40 INJECTION, POWDER, FOR SOLUTION INTRAVENOUS at 17:49

## 2022-08-21 RX ADMIN — SODIUM CHLORIDE: 9 INJECTION, SOLUTION INTRAVENOUS at 20:44

## 2022-08-21 RX ADMIN — Medication 10 ML: at 21:32

## 2022-08-21 RX ADMIN — OCTREOTIDE ACETATE 25 MCG/HR: 500 INJECTION, SOLUTION INTRAVENOUS; SUBCUTANEOUS at 20:44

## 2022-08-21 RX ADMIN — OCTREOTIDE ACETATE 50 MCG: 50 INJECTION, SOLUTION INTRAVENOUS; SUBCUTANEOUS at 18:03

## 2022-08-21 RX ADMIN — BRIMONIDINE TARTRATE 1 DROP: 2 SOLUTION OPHTHALMIC at 21:23

## 2022-08-21 ASSESSMENT — PAIN SCALES - GENERAL
PAINLEVEL_OUTOF10: 0
PAINLEVEL_OUTOF10: 7
PAINLEVEL_OUTOF10: 0

## 2022-08-21 ASSESSMENT — ENCOUNTER SYMPTOMS
SHORTNESS OF BREATH: 0
CONSTIPATION: 0
ABDOMINAL PAIN: 0
DIARRHEA: 0
NAUSEA: 0

## 2022-08-21 ASSESSMENT — PAIN DESCRIPTION - LOCATION: LOCATION: LEG

## 2022-08-21 ASSESSMENT — PAIN - FUNCTIONAL ASSESSMENT: PAIN_FUNCTIONAL_ASSESSMENT: 0-10

## 2022-08-21 NOTE — ED PROVIDER NOTES
Patient states he had a mechanical fall and was on the ground for 1 hr. His neighbor found him and called EMS. Patient states \"I hurt all over\". He denies head injury or LOC. He is seen regularly by visiting nurses for dressing changes. He has chronic wounds of the left hand and lower extremities. The history is provided by the patient. Fall  The accident occurred 1 to 2 hours ago. The fall occurred while walking. He fell from a height of 1 to 2 ft. He landed on A hard floor. There was no blood loss. The pain is mild (diffuse). He was Not ambulatory at the scene. There was Entrapment after the fall. There was No drug use involved in the accident. There was No alcohol use involved in the accident. Pertinent negatives include no fever, no numbness, no abdominal pain, no nausea, no headaches and no loss of consciousness. The symptoms are aggravated by activity. He has tried nothing for the symptoms. The treatment provided no relief. Review of Systems   Constitutional:  Positive for activity change and fatigue. Negative for appetite change, chills, diaphoresis and fever. HENT: Negative. Eyes:  Negative for visual disturbance. Respiratory:  Negative for shortness of breath. Cardiovascular:  Negative for chest pain, palpitations and leg swelling. Gastrointestinal:  Negative for abdominal pain, constipation, diarrhea and nausea. Genitourinary:  Negative for decreased urine volume and flank pain. Musculoskeletal:  Positive for arthralgias and myalgias. Negative for joint swelling. Skin:  Positive for wound (chronic wounds). Neurological:  Positive for weakness (diffuse). Negative for dizziness, loss of consciousness, light-headedness, numbness and headaches. Physical Exam  Vitals and nursing note reviewed. Constitutional:       Appearance: He is well-developed and normal weight. Comments: Ill kempt   HENT:      Head: Normocephalic and atraumatic.       Nose: Nose normal. Mouth/Throat:      Mouth: Mucous membranes are moist.      Pharynx: Oropharynx is clear. Eyes:      Extraocular Movements: Extraocular movements intact. Conjunctiva/sclera: Conjunctivae normal.      Pupils: Pupils are equal, round, and reactive to light. Cardiovascular:      Rate and Rhythm: Normal rate and regular rhythm. Pulses: Normal pulses. Heart sounds: Normal heart sounds. No murmur heard. Pulmonary:      Effort: Pulmonary effort is normal. No respiratory distress. Breath sounds: Normal breath sounds. No wheezing or rales. Abdominal:      General: Bowel sounds are normal.      Palpations: Abdomen is soft. Tenderness: There is no abdominal tenderness. There is no guarding or rebound. Musculoskeletal:         General: No swelling or tenderness. Normal range of motion. Cervical back: Normal range of motion and neck supple. Right lower leg: No edema. Left lower leg: No edema. Skin:     General: Skin is warm and dry. Comments: Superficial chronic wounds of b/l lower extremities and left dorsal wrist   Neurological:      General: No focal deficit present. Mental Status: He is alert and oriented to person, place, and time. Mental status is at baseline. Cranial Nerves: No cranial nerve deficit. Motor: Weakness (generalized) present. Coordination: Coordination normal.   Psychiatric:         Mood and Affect: Mood normal.         Behavior: Behavior normal.         Thought Content: Thought content normal.         Judgment: Judgment normal.       Procedures    [facility]      ED Course as of 08/21/22 1739   Sun Aug 21, 2022   1734 Called to the room. Patient had a large amount of coffee ground emesis. Daughter at bedside and admits to a history of alcoholism. Will start protonix and octreotide. [JS]   46 Spoke to Daughter-patient would not want anything heroic. He is confused at this time. He will be a DNR-CCA. He is refusing a NG tube.  [JS] ED Course User Index  [JS] Colleen Martinez DO   EKG Interpretation    Interpreted by emergency department physician    Rhythm: paced  Rate: normal  Axis: right  Ectopy: premature ventricular contractions (unifocal)  Conduction: normal  ST Segments: normal  T Waves: normal  Q Waves: nonspecific    Clinical Impression: paced rhythm    Colleen Martinez DO  ED Course as of 08/21/22 1739   Sun Aug 21, 2022   1734 Called to the room. Patient had a large amount of coffee ground emesis. Daughter at bedside and admits to a history of alcoholism. Will start protonix and octreotide. [JS]   46 Spoke to Daughter-patient would not want anything heroic. He is confused at this time. He will be a DNR-CCA. He is refusing a NG tube. [JS]      ED Course User Index  [JS] Colleen Martinez DO       --------------------------------------------- PAST HISTORY ---------------------------------------------  Past Medical History:  has a past medical history of A-fib ([address] Anticoagulant long-term use, Atrial fibrillation ([address] BPH (benign prostatic hyperplasia), CAD (coronary artery disease), Cataract of left eye, Cerebrovascular disease, CHF (congestive heart failure) ([address] Diabetes mellitus ([address] Dislocated IOL (intraocular lens), posterior, Hearing loss, Hyperlipidemia, Hypertension, Osteoarthritis, Peripheral vascular disease ([address] Renal cyst, Right cataract, Type 2 diabetes mellitus with stage 3b chronic kidney disease, without long-term current use of insulin ([address] Type 2 diabetes mellitus without complication ([address] and Unspecified cerebral artery occlusion with cerebral infarction. Past Surgical History:  has a past surgical history that includes joint replacement; Cataract removal with implant (Right, 11 12 2013); eye surgery (Right, 11/15/13); and Cataract removal with implant (Left, 03/11/13). Social History:  reports that he quit smoking about 24 years ago.  His smoking use included cigarettes. He has a 30.00 pack-year smoking history. He quit smokeless tobacco use about 6 years ago. He reports current alcohol use. He reports that he does not use drugs. Family History: family history includes Asthma in his mother; High Blood Pressure in his mother; Stroke in his mother. The patients home medications have been reviewed.     Allergies: Lipitor [atorvastatin calcium] and Vytorin [ezetimibe-simvastatin]    -------------------------------------------------- RESULTS -------------------------------------------------    Lab  Results for orders placed or performed during the hospital encounter of 08/21/22   COVID-19, Rapid    Specimen: Nasopharyngeal Swab   Result Value Ref Range    SARS-CoV-2, NAAT Not Detected Not Detected   Basic metabolic panel   Result Value Ref Range    Sodium 139 132 - 146 mmol/L    Potassium 4.5 3.5 - 5.0 mmol/L    Chloride 104 98 - 107 mmol/L    CO2 24 22 - 29 mmol/L    Anion Gap 11 7 - 16 mmol/L    Glucose 137 (H) 74 - 99 mg/dL    BUN 33 (H) 6 - 23 mg/dL    Creatinine 1.6 (H) 0.7 - 1.2 mg/dL    GFR Non-African American 41 >=60 mL/min/1.73    GFR African American 50     Calcium 9.9 8.6 - 10.2 mg/dL   CBC   Result Value Ref Range    WBC 27.0 (H) 4.5 - 11.5 E9/L    RBC 4.45 3.80 - 5.80 E12/L    Hemoglobin 14.3 12.5 - 16.5 g/dL    Hematocrit 43.9 37.0 - 54.0 %    MCV 98.7 80.0 - 99.9 fL    MCH 32.1 26.0 - 35.0 pg    MCHC 32.6 32.0 - 34.5 %    RDW 13.5 11.5 - 15.0 fL    Platelets 074 364 - 026 E9/L    MPV 10.5 7.0 - 12.0 fL   Troponin   Result Value Ref Range    Troponin, High Sensitivity 145 (H) 0 - 11 ng/L   CK   Result Value Ref Range    Total  (H) 20 - 200 U/L   Urinalysis with Microscopic   Result Value Ref Range    Color, UA Yellow Straw/Yellow    Clarity, UA Clear Clear    Glucose, Ur Negative Negative mg/dL    Bilirubin Urine SMALL (A) Negative    Ketones, Urine 15 (A) Negative mg/dL    Specific Gravity, UA 1.025 1.005 - 1.030    Blood, Urine TRACE (A) Negative pH, UA 5.5 5.0 - 9.0    Protein, UA 30 (A) Negative mg/dL    Urobilinogen, Urine 0.2 <2.0 E.U./dL    Nitrite, Urine Negative Negative    Leukocyte Esterase, Urine Negative Negative    WBC, UA 0-1 0 - 5 /HPF    RBC, UA 0-1 0 - 2 /HPF    Epithelial Cells, UA FEW /HPF    Bacteria, UA RARE (A) None Seen /HPF   Troponin   Result Value Ref Range    Troponin, High Sensitivity 122 (H) 0 - 11 ng/L       Radiology  XR CHEST 1 VIEW   Final Result   No acute process. CT HEAD WO CONTRAST   Final Result   1. There are limitation on the present study by strong streaking artifacts   in the area of the posterior fossa, and floor of the anterior and middle   cranial fossa. 2.  In the areas that are not severely affected by the artifacts there is no   indication for acute intracranial bleed or evidence for a sizable area of a   new acute recent insult in progression to the brain parenchyma. CT CERVICAL SPINE WO CONTRAST   Final Result   No acute abnormality of the cervical spine. XR HIP LEFT (2-3 VIEWS)    (Results Pending)   US DUP LOWER EXTREMITY RIGHT ANTWON    (Results Pending)       ------------------------- NURSING NOTES AND VITALS REVIEWED ---------------------------  Date / Time Roomed:  8/21/2022  2:04 PM  ED Bed Assignment:  12/12    The nursing notes within the ED encounter and vital signs as below have been reviewed. Patient Vitals for the past 24 hrs:   BP Temp Temp src Pulse Resp SpO2 Height Weight   08/21/22 1615 132/66 -- -- 82 -- 97 % -- --   08/21/22 1420 130/68 98.4 °F (36.9 °C) Oral 88 18 98 % 5' 9\" (1.753 m) 180 lb (81.6 kg)       Oxygen Saturation Interpretation: Normal          I have spoken with the patient and daughter  and discussed todays results, in addition to providing specific details for the plan of care and counseling regarding the diagnosis and prognosis.   Their questions are answered at this time and they are agreeable with the plan.      --------------------------------- ADDITIONAL PROVIDER NOTES ---------------------------------  Consultations:  Spoke with Dr. Don Oh,  They will admit this patient. This patient's ED course included: a personal history and physicial examination, multiple bedside re-evaluations, IV medications, and complex medical decision making and emergency management    This patient has remained hemodynamically stable and been closely monitored during their ED course. Please note that the withdrawal or failure to initiate urgent interventions for this patient would likely result in a life threatening deterioration or permanent disability. Accordingly this patient received 30 minutes of critical care time, excluding separately billable procedures. Clinical Impression  1. Gastrointestinal hemorrhage with hematemesis    2. Non-traumatic rhabdomyolysis    3. Cellulitis of right leg    4. Elevated troponin    5. CHRISTOPHER (acute kidney injury) ([facility] [address]          Disposition  Patient's disposition: Admit to [facility]  Patient's condition is fair.        [id number] [place], DO  08/21/22 4586

## 2022-08-21 NOTE — H&P
[address] [place], [place] [address]ist Group   History and Physical      CHIEF COMPLAINT: Confusion    History of Present Illness:  80 y.o. male with a history of atrial fibrillation systemic anticoagulation BPH CAD cerebrovascular disease diabetes hard of hearing hyperlipidemia hypertension osteoarthritis peripheral vascular disease chronic kidney disease stage IIIb presents with confusion. He was found on the ground at home by his neighbor who speculates he must of been there for at least a couple of hours. He lives alone he has a history of cognitive impairment manifesting over the last couple years according to the daughter helps with a history the daughter Adamaris Linares is at the bedside. She agrees that he is not been himself during the ER visit more confused than usual.  He usually functions well at home. Patient has a family who is next of kin consists of a son who was in a nursing home with cognitive issues a daughter Josse Maza who is a PA and the daughter Adamaris Linares as mentioned who was at the bedside. Adamaris Linares says that there is no official POA but that her and her sister agree that he has mentioned his desires to be a DNR. The emergency room doctor who also helps with the history also spoke to one of the daughters to confirm this  @7943 ED notes that he had coffee-ground emesis    REVIEW OF SYSTEMS:  no fevers, chills, cp, sob, n/v, ha, vision/hearing changes, wt changes, hot/cold flashes, other open skin lesions, diarrhea, constipation, dysuria/hematuria unless noted in HPI. Complete ROS performed with the patient and is otherwise negative.       PMH:  Past Medical History:   Diagnosis Date    A-fib [facility])     Anticoagulant long-term use     Atrial fibrillation (HCC)     BPH (benign prostatic hyperplasia)     CAD (coronary artery disease)     Cataract of left eye 3/11/2014    Cerebrovascular disease     CHF (congestive heart failure) ([facility]     Diabetes mellitus ([facility]     Dislocated IOL (intraocular lens), posterior 11/15/2013    Hearing loss     Hyperlipidemia     Hypertension     Osteoarthritis     Peripheral vascular disease ([facility]     Renal cyst     Right cataract 11/12/2013    Type 2 diabetes mellitus with stage 3b chronic kidney disease, without long-term current use of insulin ([facility] 2/22/2022    Type 2 diabetes mellitus without complication ([facility]     Unspecified cerebral artery occlusion with cerebral infarction 10/2010    mild stroke       Surgical History:  Past Surgical History:   Procedure Laterality Date    CATARACT REMOVAL WITH IMPLANT Right 11 12 2013    CATARACT REMOVAL WITH IMPLANT Left 03/11/13    EYE SURGERY Right 11/15/13    dislocated intraocular lens    JOINT REPLACEMENT      left hip       Medications Prior to Admission:    Prior to Admission medications    Medication Sig Start Date End Date Taking? Authorizing Provider   apixaban (ELIQUIS) 2.5 MG TABS tablet Take 1 tablet by mouth 2 times daily 6/22/22   LAURENCE Posada CNP   pravastatin (PRAVACHOL) 40 MG tablet Take 1 tablet by mouth at bedtime Note increase in dose 6/13/22   LAURENCE Posada CNP   ramipril (ALTACE) 2.5 MG capsule Take 1 capsule by mouth daily 6/13/22 9/11/22  LAURENCE Posada CNP   furosemide (LASIX) 20 MG tablet Take 1 tablet by mouth daily 6/13/22   LAURENCE Posada CNP   glipiZIDE (GLUCOTROL XL) 2.5 MG extended release tablet Take 1 tablet by mouth daily 6/13/22   LAURENCE Posada CNP   brimonidine-timolol (COMBIGAN) 0.2-0.5 % ophthalmic solution Place 1 drop into the right eye every 12 hours. 11/12/13   Amauri Russell MD       Allergies:    Lipitor [atorvastatin calcium] and Vytorin [ezetimibe-simvastatin]    Social History:    reports that he quit smoking about 24 years ago. His smoking use included cigarettes. He has a 30.00 pack-year smoking history. He quit smokeless tobacco use about 6 years ago. He reports current alcohol use. He reports that he does not use drugs.       Family History: family history includes Asthma in his mother; High Blood Pressure in his mother; Stroke in his mother. PHYSICAL EXAM:  Vitals:  BP (!) 142/47   Pulse 80   Temp 98.4 °F (36.9 °C) (Oral)   Resp 18   Ht 5' 9\" (1.753 m)   Wt 180 lb (81.6 kg)   SpO2 95%   BMI 26.58 kg/m²     General Appearance: alert and oriented to person, place and time and in no acute distress  Skin: warm and dry  Head: normocephalic and atraumatic  Eyes: pupils equal, round, and reactive to light, extraocular eye movements intact, conjunctivae normal  Neck: neck supple and non tender without mass   Pulmonary/Chest: clear to auscultation bilaterally- no wheezes, rales or rhonchi, normal air movement, no respiratory distress  Cardiovascular: normal rate, normal S1 and S2 and no carotid bruits  Abdomen: soft, non-tender, non-distended, normal bowel sounds, no masses or organomegaly  Extremities: no cyanosis, no clubbing and no edema  Neurologic: no cranial nerve deficit and speech normal.  He is sleepy arousable but confused and a poor historian  Abrasions on his thin skin especially of the left upper limb. Edema of his right leg with minimal erythema around an abrasion possibly warmer in this region  Daughter says his right leg is frequently more swollen than the left but occasionally they are both swollen at the same time   dry mouth    LABS:  Recent Labs     08/21/22  1437      K 4.5      CO2 24   BUN 33*   CREATININE 1.6*   GLUCOSE 137*   CALCIUM 9.9       Recent Labs     08/21/22  1437   WBC 27.0*   RBC 4.45   HGB 14.3   HCT 43.9   MCV 98.7   MCH 32.1   MCHC 32.6   RDW 13.5      MPV 10.5       No results for input(s): POCGLU in the last 72 hours.         Radiology: CT HEAD WO CONTRAST    Result Date: 8/21/2022  EXAMINATION: CT OF THE HEAD WITHOUT CONTRAST  8/21/2022 3:22 pm TECHNIQUE: CT of the head was performed without the administration of intravenous contrast. Automated exposure control, iterative reconstruction, without the administration of intravenous contrast. Multiplanar reformatted images are provided for review. Automated exposure control, iterative reconstruction, and/or weight based adjustment of the mA/kV was utilized to reduce the radiation dose to as low as reasonably achievable. COMPARISON: None. HISTORY: ORDERING SYSTEM PROVIDED HISTORY: fall TECHNOLOGIST PROVIDED HISTORY: Reason for exam:->fall Decision Support Exception - unselect if not a suspected or confirmed emergency medical condition->Emergency Medical Condition (MA) FINDINGS: Motion artifact is present. BONES/ALIGNMENT: There is no acute fracture or traumatic malalignment. DEGENERATIVE CHANGES: Severe facet arthropathy at C2-3, C3-4 and C4-5 on the right. Moderate degenerative disc disease at C5-6 and C6-7. Small bulging disc at C4-5 with uncovertebral joint osteophytes at C5-6 and C6-7. No osseous narrowing of the canal. SOFT TISSUES: There is no prevertebral soft tissue swelling minimal fluid identified in the sphenoid sinus. No acute abnormality of the cervical spine. XR CHEST 1 VIEW    Result Date: 8/21/2022  EXAMINATION: ONE XRAY VIEW OF THE CHEST 8/21/2022 3:30 pm COMPARISON: 07/28/2021 HISTORY: ORDERING SYSTEM PROVIDED HISTORY: cough TECHNOLOGIST PROVIDED HISTORY: Reason for exam:->cough FINDINGS: The lungs are without acute focal process. There is no effusion or pneumothorax. The cardiomediastinal silhouette is without acute process. The osseous structures are without acute process. Single lead pacemaker is again seen on the left, unchanged in position. The left lung base is partially obscured by overlying soft tissues. No acute process. EKG: Pending previous EKG reviewed from March 2020 showed a ventricular paced rhythm    ASSESSMENT:      Active Problems:    * No active hospital problems. *  Resolved Problems:    * No resolved hospital problems.  *    2D echo from February 2010 shows mild valvular disease with a normal EF    PLAN:    Toxic metabolic encephalopathy on top of what seems like a undiagnosed history of dementia. We will give him supportive care for the problems that are contributing to it including #2 and 3 below  Dehydration causing CHRISTOPHER on top of CKD #3 IV fluids and monitor daily. 1 L faster then decide to lower rate if urine output is adequate and if he is doing well. Soon after that he will have his morning labs on 8/22  Cellulitis of the right leg however this just might be a chronic change due to his chronic edema of that leg nonetheless it would make sense that he has a source of infection with this presentation including an elevated white count thus I will continue antibiotic coverage empirically rocephin. Bcx sent  Mild rhabdomyolysis contributing to #2 again rehydrate and recheck CK in the a.m. Hypertension hold his Lasix and ACE inhibitor monitor  Glaucoma continue formulary equivalent of Combigan  Hyperlipidemia continue statin  2 diabetes sliding scale coverage diabetic diet hold Glucotrol  Atrial fibrillation hold systemic anticoagulation with Eliquis  No change to outpatient treatment regimen for the existing stable combordities including: CAD cerebrovascular disease diabetes hard of hearing osteoarthritis peripheral vascular disease . Actually other than the above-mentioned medications he is not on any other home meds that we know of at this point  Remote history of alcoholism monitor   Hematemesis probably due to upper GI bleed consult GI. I will discussed with pharmacist about redosing octreotide for gtts. Continue PPI however I suspect based on what ER discussed with the daughter that they will not want aggressive measures. We will monitor his H&H   code Status: DNR CCA  DVT prophylaxis: Eliquis is held due to #12 and thus he will have SCDs  Disposition when he is more stable PT OT can be ordered and assessed if he needs temporary placement.   Other than this incident despite his decline over the last couple years he seems to be functioning well at home with neighbors and daughter frequently checking on him  NOTE: This report was transcribed using voice recognition software. Every effort was made to ensure accuracy; however, inadvertent computerized transcription errors may be present.      Electronically signed by David Esparza MD on 8/21/2022 at 6:46 PM

## 2022-08-22 LAB
ALBUMIN SERPL-MCNC: 3.1 G/DL (ref 3.5–5.2)
ALP BLD-CCNC: 54 U/L (ref 40–129)
ALT SERPL-CCNC: 7 U/L (ref 0–40)
AMMONIA: 19 UMOL/L (ref 16–60)
AMPHETAMINE SCREEN, URINE: NOT DETECTED
ANION GAP SERPL CALCULATED.3IONS-SCNC: 10 MMOL/L (ref 7–16)
AST SERPL-CCNC: 49 U/L (ref 0–39)
BARBITURATE SCREEN URINE: NOT DETECTED
BENZODIAZEPINE SCREEN, URINE: NOT DETECTED
BILIRUB SERPL-MCNC: 0.3 MG/DL (ref 0–1.2)
BILIRUBIN DIRECT: <0.2 MG/DL (ref 0–0.3)
BILIRUBIN, INDIRECT: ABNORMAL MG/DL (ref 0–1)
BUN BLDV-MCNC: 32 MG/DL (ref 6–23)
CALCIUM SERPL-MCNC: 8.8 MG/DL (ref 8.6–10.2)
CANNABINOID SCREEN URINE: NOT DETECTED
CHLORIDE BLD-SCNC: 103 MMOL/L (ref 98–107)
CO2: 21 MMOL/L (ref 22–29)
COCAINE METABOLITE SCREEN URINE: NOT DETECTED
CREAT SERPL-MCNC: 1.3 MG/DL (ref 0.7–1.2)
FENTANYL SCREEN, URINE: NOT DETECTED
GFR AFRICAN AMERICAN: >60
GFR NON-AFRICAN AMERICAN: 52 ML/MIN/1.73
GLUCOSE BLD-MCNC: 125 MG/DL (ref 74–99)
HCT VFR BLD CALC: 39.7 % (ref 37–54)
HEMOGLOBIN: 12.7 G/DL (ref 12.5–16.5)
INR BLD: 1.4
LV EF: 48 %
LVEF MODALITY: NORMAL
Lab: NORMAL
MCH RBC QN AUTO: 32 PG (ref 26–35)
MCHC RBC AUTO-ENTMCNC: 32 % (ref 32–34.5)
MCV RBC AUTO: 100 FL (ref 80–99.9)
METER GLUCOSE: 107 MG/DL (ref 74–99)
METER GLUCOSE: 68 MG/DL (ref 74–99)
METER GLUCOSE: 82 MG/DL (ref 74–99)
METHADONE SCREEN, URINE: NOT DETECTED
OPIATE SCREEN URINE: NOT DETECTED
OXYCODONE URINE: NOT DETECTED
PDW BLD-RTO: 14 FL (ref 11.5–15)
PHENCYCLIDINE SCREEN URINE: NOT DETECTED
PLATELET # BLD: 184 E9/L (ref 130–450)
PMV BLD AUTO: 10.5 FL (ref 7–12)
POTASSIUM SERPL-SCNC: 4.6 MMOL/L (ref 3.5–5)
PROTHROMBIN TIME: 16.6 SEC (ref 9.3–12.4)
RBC # BLD: 3.97 E12/L (ref 3.8–5.8)
SODIUM BLD-SCNC: 134 MMOL/L (ref 132–146)
TOTAL CK: 1256 U/L (ref 20–200)
TOTAL PROTEIN: 6.6 G/DL (ref 6.4–8.3)
TROPONIN, HIGH SENSITIVITY: 94 NG/L (ref 0–11)
URIC ACID, SERUM: 7.9 MG/DL (ref 3.4–7)
WBC # BLD: 21.4 E9/L (ref 4.5–11.5)

## 2022-08-22 PROCEDURE — 6360000002 HC RX W HCPCS: Performed by: HOSPITALIST

## 2022-08-22 PROCEDURE — 2580000003 HC RX 258: Performed by: HOSPITALIST

## 2022-08-22 PROCEDURE — 6360000002 HC RX W HCPCS: Performed by: INTERNAL MEDICINE

## 2022-08-22 PROCEDURE — 82140 ASSAY OF AMMONIA: CPT

## 2022-08-22 PROCEDURE — 2060000000 HC ICU INTERMEDIATE R&B

## 2022-08-22 PROCEDURE — 97165 OT EVAL LOW COMPLEX 30 MIN: CPT

## 2022-08-22 PROCEDURE — 97535 SELF CARE MNGMENT TRAINING: CPT

## 2022-08-22 PROCEDURE — 80307 DRUG TEST PRSMV CHEM ANLYZR: CPT

## 2022-08-22 PROCEDURE — 2580000003 HC RX 258: Performed by: INTERNAL MEDICINE

## 2022-08-22 PROCEDURE — A4216 STERILE WATER/SALINE, 10 ML: HCPCS | Performed by: HOSPITALIST

## 2022-08-22 PROCEDURE — 82550 ASSAY OF CK (CPK): CPT

## 2022-08-22 PROCEDURE — 80076 HEPATIC FUNCTION PANEL: CPT

## 2022-08-22 PROCEDURE — 84484 ASSAY OF TROPONIN QUANT: CPT

## 2022-08-22 PROCEDURE — 85610 PROTHROMBIN TIME: CPT

## 2022-08-22 PROCEDURE — 93306 TTE W/DOPPLER COMPLETE: CPT

## 2022-08-22 PROCEDURE — C9113 INJ PANTOPRAZOLE SODIUM, VIA: HCPCS | Performed by: HOSPITALIST

## 2022-08-22 PROCEDURE — 36415 COLL VENOUS BLD VENIPUNCTURE: CPT

## 2022-08-22 PROCEDURE — 99233 SBSQ HOSP IP/OBS HIGH 50: CPT | Performed by: INTERNAL MEDICINE

## 2022-08-22 PROCEDURE — 6370000000 HC RX 637 (ALT 250 FOR IP): Performed by: INTERNAL MEDICINE

## 2022-08-22 PROCEDURE — 97530 THERAPEUTIC ACTIVITIES: CPT | Performed by: PHYSICAL THERAPIST

## 2022-08-22 PROCEDURE — 82962 GLUCOSE BLOOD TEST: CPT

## 2022-08-22 PROCEDURE — 85027 COMPLETE CBC AUTOMATED: CPT

## 2022-08-22 PROCEDURE — 97116 GAIT TRAINING THERAPY: CPT | Performed by: PHYSICAL THERAPIST

## 2022-08-22 PROCEDURE — 84550 ASSAY OF BLOOD/URIC ACID: CPT

## 2022-08-22 PROCEDURE — 80048 BASIC METABOLIC PNL TOTAL CA: CPT

## 2022-08-22 RX ORDER — OXYCODONE HYDROCHLORIDE AND ACETAMINOPHEN 5; 325 MG/1; MG/1
1 TABLET ORAL EVERY 4 HOURS PRN
Status: DISCONTINUED | OUTPATIENT
Start: 2022-08-22 | End: 2022-08-27 | Stop reason: HOSPADM

## 2022-08-22 RX ADMIN — PANTOPRAZOLE SODIUM 40 MG: 40 INJECTION, POWDER, FOR SOLUTION INTRAVENOUS at 20:32

## 2022-08-22 RX ADMIN — BRIMONIDINE TARTRATE 1 DROP: 2 SOLUTION OPHTHALMIC at 10:39

## 2022-08-22 RX ADMIN — Medication 10 ML: at 10:39

## 2022-08-22 RX ADMIN — VANCOMYCIN HYDROCHLORIDE 1000 MG: 1 INJECTION, POWDER, LYOPHILIZED, FOR SOLUTION INTRAVENOUS at 20:33

## 2022-08-22 RX ADMIN — TIMOLOL MALEATE 1 DROP: 5 SOLUTION OPHTHALMIC at 20:32

## 2022-08-22 RX ADMIN — ACETAMINOPHEN 650 MG: 325 TABLET ORAL at 10:38

## 2022-08-22 RX ADMIN — TIMOLOL MALEATE 1 DROP: 5 SOLUTION OPHTHALMIC at 10:39

## 2022-08-22 RX ADMIN — WATER 1000 MG: 1 INJECTION INTRAMUSCULAR; INTRAVENOUS; SUBCUTANEOUS at 17:20

## 2022-08-22 RX ADMIN — BRIMONIDINE TARTRATE 1 DROP: 2 SOLUTION OPHTHALMIC at 20:32

## 2022-08-22 RX ADMIN — PANTOPRAZOLE SODIUM 40 MG: 40 INJECTION, POWDER, FOR SOLUTION INTRAVENOUS at 10:38

## 2022-08-22 RX ADMIN — Medication 10 ML: at 20:32

## 2022-08-22 ASSESSMENT — PAIN DESCRIPTION - DESCRIPTORS: DESCRIPTORS: ACHING

## 2022-08-22 ASSESSMENT — PAIN DESCRIPTION - LOCATION: LOCATION: GENERALIZED

## 2022-08-22 ASSESSMENT — PAIN SCALES - GENERAL
PAINLEVEL_OUTOF10: 3
PAINLEVEL_OUTOF10: 0

## 2022-08-22 NOTE — PROGRESS NOTES
Pt c/o increased pain in right leg. Dr. Melva Kim called and notified of increased pain and swelling in right leg. States he will put orders in shortly.

## 2022-08-22 NOTE — PROGRESS NOTES
Physical Therapy  Physical Therapy Initial Evaluation/Plan of Care    Room #:  6810/6676-85  Patient Name: Luis E Linares  YOB: 1934  MRN: [id number]    Date of Service: 8/22/2022     Tentative placement recommendation: Subacute vs Home Health Physical Therapy if patient meets goals with 24/7 Supervision  Equipment recommendation: Patient has needed equipment       Evaluating Physical Therapist: Shai Hawley, PT, DPT #[id number]      Specific Provider Orders/Date/Referring Provider :     08/22/22 0815    PT eval and treat  Start:  08/22/22 0815,   End:  08/22/22 0815,   ONE TIME,   Standing Count:  1 Occurrences,   Toshia Chew MD Acknowledge New     Admitting Diagnosis:   GIB (gastrointestinal bleeding) [K92.2]  Elevated troponin [R77.8]  CHRISTOPHER (acute kidney injury) ([facility] [address] [N17.9]  Cellulitis of right leg [K74.081]  Non-traumatic rhabdomyolysis [M62.82]  Gastrointestinal hemorrhage with hematemesis [K92.0]      Surgery:   Visit Diagnoses         Codes    Non-traumatic rhabdomyolysis     M62.82    Cellulitis of right leg     L03.115    Elevated troponin     R77.8    CHRISTOPHER (acute kidney injury) ([facility] [address]     N17.9            Patient Active Problem List   Diagnosis    Essential hypertension    Inflammatory arthritis    Paroxysmal A-fib ([facility] [address]    Mixed hyperlipidemia    Pacemaker    Type 2 diabetes mellitus with stage 3b chronic kidney disease, without long-term current use of insulin ([address]    Ichthyosis    Hip fracture requiring operative repair, left, closed, initial encounter ([facility] [address]    Stage 3 chronic kidney disease ([facility] [address]    Bilateral leg edema    Blister of right lower leg without infection    Blister of left leg without infection    Ear lesion    Vaccination declined by patient    GIB (gastrointestinal bleeding)        ASSESSMENT of Current Deficits Patient exhibits decreased strength, balance, and endurance impairing functional mobility, transfers, gait , gait distance, and tolerance to activity. Pt mildly unsteady with gait with VC needed for safety and WW approximation. Pt confused at times throughout session and required additional therapy for safe return home with home support. PHYSICAL THERAPY  PLAN OF CARE       Physical therapy plan of care is established based on physician order,  patient diagnosis and clinical assessment    Current Treatment Recommendations:    -Bed Mobility: Lower extremity exercises  and Trunk control activities   -Sitting Balance: Incorporate reaching activities to activate trunk muscles , Hands on support to maintain midline , Facilitate active trunk muscle engagement , Facilitate postural control in all planes , and Engage in core activities to allow for movement within base of support   -Standing Balance: Perform strengthening exercises in standing to promote motor control with or without upper extremity support , Instruct patient on adequate base of support to maintain balance, and Challenge balance utilizing reaching  activities beyond center of gravity    -Transfers: Provide instruction on proper hand and foot position for adequate transfer of weight onto lower extremities and use of gait device if needed, Cues for hand placement, technique and safety.  Provide stabilization to prevent fall , Facilitate weight shift forward on to lower extremities and provide necessary stabilization of bilateral lower extremities , Support transfer of weight on to lower extremities, and Assist with extension of knees trunk and hip to accept weight transfer   -Gait: Gait training, Standing activities to improve: base of support, weight shift, weight bearing , Exercises to improve trunk control, Exercises to improve hip and knee control, Performance of protected weight bearing activities, and Activities to increase weight bearing   -Endurance: Utilize Supervised activities to increase level of endurance to allow for safe functional mobility including transfers and gait  and Use graduated activities to promote good breathing techniques and provide support and education to maximize respiratory function  -Stairs: Stair training with instruction on proper technique and hand placement on rail    PT long term treatment goals are located in below grid    Patient and or family understand(s) diagnosis, prognosis, and plan of care. Frequency of treatments: Patient will be seen  daily. Prior Level of Function: Patient ambulated with wheeled walker   Rehab Potential: good - for baseline    Past medical history:   Past Medical History:   Diagnosis Date    A-fib ([address]     Anticoagulant long-term use     Atrial fibrillation (HCC)     BPH (benign prostatic hyperplasia)     CAD (coronary artery disease)     Cataract of left eye 3/11/2014    Cerebrovascular disease     CHF (congestive heart failure) (HCC)     Diabetes mellitus ([address]     Dislocated IOL (intraocular lens), posterior 11/15/2013    Hearing loss     Hyperlipidemia     Hypertension     Osteoarthritis     Peripheral vascular disease ([address]     Renal cyst     Right cataract 11/12/2013    Type 2 diabetes mellitus with stage 3b chronic kidney disease, without long-term current use of insulin ([address] 2/22/2022    Type 2 diabetes mellitus without complication ([address]     Unspecified cerebral artery occlusion with cerebral infarction 10/2010    mild stroke     Past Surgical History:   Procedure Laterality Date    CATARACT REMOVAL WITH IMPLANT Right 11 12 2013    CATARACT REMOVAL WITH IMPLANT Left 03/11/13    EYE SURGERY Right 11/15/13    dislocated intraocular lens    JOINT REPLACEMENT      left hip       SUBJECTIVE:    Precautions:  Up with assistance, falls, confusion, and hard of hearing     Social history: Patient lives alone in a ranch home  with 3 steps  to enter bilateral Rail  Tub shower grab bars    Equipment owned: Lisa Jones, Standard Walker, [address] [facility] chair, and Tub transfer bench,      [address] Inpatient   How much difficulty turning over in bed?: A Little  How much difficulty sitting down on / standing up from a chair with arms?: A Lot  How much difficulty moving from lying on back to sitting on side of bed?: A Little  How much help from another person moving to and from a bed to a chair?: A Little  How much help from another person needed to walk in hospital room?: A Little  How much help from another person for climbing 3-5 steps with a railing?: A Lot  AM-PAC Inpatient Mobility Raw Score : 16  AM-PAC Inpatient T-Scale Score : 40.78  Mobility Inpatient CMS 0-100% Score: 54.16  Mobility Inpatient CMS G-Code Modifier : CK    Nursing cleared patient for PT evaluation. The admitting diagnosis and active problem list as listed above have been reviewed prior to the initiation of this evaluation. OBJECTIVE;   Initial Evaluation  Date: 8/22/2022 Treatment Date:     Short Term/ Long Term   Goals   Was pt agreeable to Eval/treatment? Yes  To be met in 4 days   Pain level   0/10       Bed Mobility    Rolling: Not assessed patient in chair    Supine to sit: Not assessed patient in chair    Sit to supine: Not assessed patient in chair    Scooting: Supervision     Rolling: Independent    Supine to sit:  Independent    Sit to supine: Independent    Scooting: Independent     Transfers Sit to stand:  Min-ModA    Sit to stand: Independent    Ambulation     2 x 60 feet using  wheeled walker with Minimal assist of 1   for walker control, walker approximation, balance, and safety    > 125 feet using  wheeled walker with Modified Independent    Stair negotiation: ascended and descended   Not assessed     3 steps with 2 HR with Mod I   ROM Within functional limits    Increase range of motion 10% of affected joints    Strength BUE:  refer to OT eval  RLE:  4-/5  LLE:  4-/5  Increase strength in affected mm groups by 1/3 grade   Balance Sitting EOB:  fair +  Dynamic Standing:  fair   Sitting EOB:  good   Dynamic Standing: fair +     Patient is Alert & Oriented x person, place, time, and situation and follows directions    Sensation:  Patient  denies numbness/tingling   Edema:  no   Endurance: fair      Vitals: room air   Blood Pressure at rest  Blood Pressure during session    Heart Rate at rest  Heart Rate during session    SPO2 at rest %  SPO2 during session %     Patient education  Patient educated on role of Physical Therapy, risks of immobility, safety and plan of care, energy conservation,  importance of mobility while in hospital , ankle pumps, quad set and glut set for edema control, blood clot prevention, and safety      Patient response to education:   Pt verbalized understanding Pt demonstrated skill Pt requires further education in this area   Yes Partial Yes      Treatment:  Patient practiced and was instructed/facilitated in the following treatment: Patient Sat edge of bed 10 minutes with Supervision  to increase dynamic sitting balance and activity tolerance. Pt performed bed mobility, transfers, ambulation in hallway. Therapeutic Exercises:  not performed        At end of session, patient in chair with     call light and phone within reach,  all lines and tubes intact, nursing notified. Patient would benefit from continued skilled Physical Therapy to improve functional independence and quality of life.          Patient's/ family goals   get stronger    Time in  1006  Time out  1050    Total Treatment Time  44 minutes    CPT codes:  Therapeutic activities (13489)   30 minutes  2 unit(s)  Gait Training (56787) 14 minutes 1 unit(s)    Ambrosio Vera PT

## 2022-08-22 NOTE — PLAN OF CARE
Problem: Discharge Planning  Goal: Discharge to home or other facility with appropriate resources  Outcome: Progressing  Flowsheets (Taken 8/22/2022 0756)  Discharge to home or other facility with appropriate resources:   Identify barriers to discharge with patient and caregiver   Identify discharge learning needs (meds, wound care, etc)   Arrange for needed discharge resources and transportation as appropriate     Problem: Pain  Goal: Verbalizes/displays adequate comfort level or baseline comfort level  Outcome: Progressing     Problem: Skin/Tissue Integrity  Goal: Absence of new skin breakdown  Description: 1. Monitor for areas of redness and/or skin breakdown  2. Assess vascular access sites hourly  3. Every 4-6 hours minimum:  Change oxygen saturation probe site  4. Every 4-6 hours:  If on nasal continuous positive airway pressure, respiratory therapy assess nares and determine need for appliance change or resting period.   Outcome: Progressing     Problem: ABCDS Injury Assessment  Goal: Absence of physical injury  Outcome: Progressing     Problem: Safety - Adult  Goal: Free from fall injury  Outcome: Progressing     Problem: Chronic Conditions and Co-morbidities  Goal: Patient's chronic conditions and co-morbidity symptoms are monitored and maintained or improved  Outcome: Progressing  Flowsheets (Taken 8/22/2022 0756)  Care Plan - Patient's Chronic Conditions and Co-Morbidity Symptoms are Monitored and Maintained or Improved: Monitor and assess patient's chronic conditions and comorbid symptoms for stability, deterioration, or improvement

## 2022-08-22 NOTE — ACP (ADVANCE CARE PLANNING)
Advance Care Planning   Healthcare Decision Maker:    Primary Decision Maker: Kusum Hoyt - Child - [phone]    Primary Decision Maker: Laurie Stevens Child - [phone]      Pt has son that lives in a nursing home in [place], [place]. Two daughters are equal decision makers for pt if he is unable to make his own medical decisions.        Electronically signed by IGGY Horton on 8/22/2022 at 1:45 PM

## 2022-08-22 NOTE — PLAN OF CARE
Problem: Discharge Planning  Goal: Discharge to home or other facility with appropriate resources  Outcome: Progressing  Flowsheets (Taken 8/21/2022 2220)  Discharge to home or other facility with appropriate resources: Identify barriers to discharge with patient and caregiver     Problem: Pain  Goal: Verbalizes/displays adequate comfort level or baseline comfort level  Outcome: Progressing     Problem: Skin/Tissue Integrity  Goal: Absence of new skin breakdown  Description: 1. Monitor for areas of redness and/or skin breakdown  2. Assess vascular access sites hourly  3. Every 4-6 hours minimum:  Change oxygen saturation probe site  4. Every 4-6 hours:  If on nasal continuous positive airway pressure, respiratory therapy assess nares and determine need for appliance change or resting period.   Outcome: Progressing     Problem: ABCDS Injury Assessment  Goal: Absence of physical injury  Outcome: Progressing     Problem: Safety - Adult  Goal: Free from fall injury  Outcome: Progressing

## 2022-08-22 NOTE — PROGRESS NOTES
Hospitalist notified of output of (1) incontinence episode and 100 out in urinal after 1L bolus running at 150 ml/hr. No new orders at this time. OK to change rate to 100 ml/hr per Dr. Luli Patricio order.

## 2022-08-22 NOTE — CONSULTS
Gemma Jimenez M.D. [facility]  Dr. Marilee Barrera M.D.,  Dr. Pallavi Tang M.D.,  Dr. Mariella Ponce D.O.,  Dr. Vanna Crabtree D.O. ,  Dr. Odilon Hills M.D.,  Dr. Albert Melchor D.O. Hannah Cargo  80 y.o.  male      Re: Hematemesis  Requesting physician: Dr. Allyson Multani  Date:11:42 AM 8/22/2022          HPI: 80-year-old male patient seen in the hospital for above-described issue. Patient apparently was brought from home because of increasing confusion. Apparently patient had episode of coffee-ground emesis in the emergency department yesterday afternoon. Patient has not had any further episodes nor has any abdominal pain. His daughter is present in the room and provides additional information in addition to the information taken from medical records and discussion with healthcare team.  According to her she does not recall him ever having colonoscopy and she is not sure if he ever had upper endoscopy. Patient hemoglobin appears to have remained stable since admission with hemoglobin yesterday of 13.3 and today 14.3 with baseline around 14 g/dL. INR is not elevated significantly at 1.4. Patient has history of atrial fibrillation with long-term anticoagulation.     Information sources:   -Patient  -family (daughter)  -medical record  -health care team    PMHx:  Past Medical History:   Diagnosis Date    A-fib [facility])     Anticoagulant long-term use     Atrial fibrillation (HCC)     BPH (benign prostatic hyperplasia)     CAD (coronary artery disease)     Cataract of left eye 3/11/2014    Cerebrovascular disease     CHF (congestive heart failure) (HCC)     Diabetes mellitus ([address]     Dislocated IOL (intraocular lens), posterior 11/15/2013    Hearing loss     Hyperlipidemia     Hypertension     Osteoarthritis     Peripheral vascular disease ([facility]     Renal cyst     Right cataract 11/12/2013    Type 2 diabetes mellitus with stage 3b chronic kidney disease, without long-term current use of insulin ([facility] 2/22/2022    Type 2 diabetes mellitus without complication ([facility]     Unspecified cerebral artery occlusion with cerebral infarction 10/2010    mild stroke       PSHx:  Past Surgical History:   Procedure Laterality Date    CATARACT REMOVAL WITH IMPLANT Right 11 12 2013    CATARACT REMOVAL WITH IMPLANT Left 03/11/13    EYE SURGERY Right 11/15/13    dislocated intraocular lens    JOINT REPLACEMENT      left hip       Meds:  Current Facility-Administered Medications   Medication Dose Route Frequency Provider Last Rate Last Admin    pantoprazole (PROTONIX) 40 mg in sodium chloride (PF) 10 mL injection  40 mg IntraVENous Q12H Alicia Castro MD   40 mg at 08/22/22 1038    sodium chloride flush 0.9 % injection 5-40 mL  5-40 mL IntraVENous 2 times per day Jaswant Rowan MD   10 mL at 08/22/22 1039    sodium chloride flush 0.9 % injection 5-40 mL  5-40 mL IntraVENous PRN Jaswant Rowan MD        0.9 % sodium chloride infusion   IntraVENous PRN Jaswant Rowan MD        ondansetron (ZOFRAN-ODT) disintegrating tablet 4 mg  4 mg Oral Q8H PRN Jaswant Rwoan MD        Or    [facility] injection 4 mg  4 mg IntraVENous Q6H PRN Jaswant Rowan MD        polyethylene glycol (GLYCOLAX) packet 17 g  17 g Oral Daily PRN Jaswant Rowan MD        acetaminophen (TYLENOL) tablet 650 mg  650 mg Oral Q6H PRN Jaswant Rowan MD   650 mg at 08/22/22 1038    Or    acetaminophen (TYLENOL) suppository 650 mg  650 mg Rectal Q6H PRN Jaswant Rowan MD        perflutren lipid microspheres (DEFINITY) injection 1.65 mg  1.5 mL IntraVENous ONCE PRN Jaswant Rowan MD        0.9 % sodium chloride infusion   IntraVENous Continuous Jaswant Rowan  mL/hr at 08/22/22 0341 Rate Change at 08/22/22 0341    [Held by provider] pravastatin (PRAVACHOL) tablet 40 mg  40 mg Oral Nightly Jaswant Rowan MD   40 mg at 08/21/22 2128    insulin lispro (HUMALOG) injection vial 0-4 Units  0-4 Units SubCUTAneous TID  Jaswant Rowan MD        insulin lispro (HUMALOG) injection vial 0-4 Units  0-4 Units SubCUTAneous Nightly Hortensia Araujo MD        brimonidine (ALPHAGAN) 0.2 % ophthalmic solution 1 drop  1 drop Both Eyes Q12H Hortensia Araujo MD   1 drop at 22 1039    And    timolol (TIMOPTIC) 0.5 % ophthalmic solution 1 drop  1 drop Both Eyes Q12H Hortensia Araujo MD   1 drop at 22 1039    cefTRIAXone (ROCEPHIN) 1,000 mg in sterile water 10 mL IV syringe  1,000 mg IntraVENous Daily Hortensia Araujo MD        octreotide (SANDOSTATIN) 500 mcg in sodium chloride 0.9 % 100 mL infusion  25 mcg/hr IntraVENous Continuous Hortensia Araujo MD 5 mL/hr at 22 25 mcg/hr at 22        SocHx:  Social History     Socioeconomic History    Marital status:      Spouse name: Not on file    Number of children: Not on file    Years of education: Not on file    Highest education level: Not on file   Occupational History    Not on file   Tobacco Use    Smoking status: Former     Packs/day: 1.00     Years: 30.00     Pack years: 30.00     Types: Cigarettes     Quit date: 3/17/1998     Years since quittin.4    Smokeless tobacco: Former     Quit date: 2015   Vaping Use    Vaping Use: Never used   Substance and Sexual Activity    Alcohol use: Yes     Comment: 2 beers daily    Drug use: No    Sexual activity: Not on file   Other Topics Concern    Not on file   Social History Narrative    Not on file     Social Determinants of Health     Financial Resource Strain: Low Risk     Difficulty of Paying Living Expenses: Not hard at all   Food Insecurity: No Food Insecurity    Worried About [facility] in the Last Year: Never true    [facility] in the Last Year: Never true   Transportation Needs: No Transportation Needs    Lack of Transportation (Medical): No    Lack of Transportation (Non-Medical):  No   Physical Activity: Sufficiently Active    Days of Exercise per Week: 7 days    Minutes of Exercise per Session: 30 min Stress: Not on file   Social Connections: Not on file   Intimate Partner Violence: Not At Risk    Fear of Current or Ex-Partner: No    Emotionally Abused: No    Physically Abused: No    Sexually Abused: No   Housing Stability: Unknown    Unable to Pay for Housing in the Last Year: No    Number of Places Lived in the Last Year: Not on file    Unstable Housing in the Last Year: No       FamHx:  Family History   Problem Relation Age of Onset    Asthma Mother     High Blood Pressure Mother     Stroke Mother        Allergy:  Allergies   Allergen Reactions    Lipitor [Atorvastatin Calcium] Other (See Comments)     Muscle cramps    Vytorin [Ezetimibe-Simvastatin] Other (See Comments)     Leg Cramps           ROS: As described in the HPI and in addition is negative upon detailed review of systems or unobtainable unless otherwise stated in this dictation. PE:  /68   Pulse 59   Temp 98.1 °F (36.7 °C) (Oral)   Resp 20   Ht 5' 9\" (1.753 m)   Wt 180 lb (81.6 kg)   SpO2 99%   BMI 26.58 kg/m²     Gen. : Elderly  male.   NAD  Head: Atraumatic/normocephalic  Eyes: EOMI/anicteric sclera  ENT: Moist oral mucosa/no discharge nose ears  Neck: Supple with trachea midline  Chest: CTA B  Cor: Regular  Abd.: Soft and nontender  Extr.:  No significant pitting edema in lower extremities  Muscles: Decreased tone and bulk, consistent with age and condition  Skin:Warm and dry/anicteric        DATA:     Lab Results   Component Value Date/Time    WBC 27.0 08/21/2022 02:37 PM    RBC 4.45 08/21/2022 02:37 PM    HGB 14.3 08/21/2022 02:37 PM    HCT 43.9 08/21/2022 02:37 PM    MCV 98.7 08/21/2022 02:37 PM    MCH 32.1 08/21/2022 02:37 PM    MCHC 32.6 08/21/2022 02:37 PM    RDW 13.5 08/21/2022 02:37 PM     08/21/2022 02:37 PM    MPV 10.5 08/21/2022 02:37 PM     Lab Results   Component Value Date/Time     08/21/2022 02:37 PM    K 4.5 08/21/2022 02:37 PM    K 3.9 03/17/2022 04:25 PM     08/21/2022 02:37 PM    CO2 24 08/21/2022 02:37 PM    BUN 33 08/21/2022 02:37 PM    CREATININE 1.6 08/21/2022 02:37 PM    CREATININE 1.2 03/23/2020 12:00 AM    CREATININE 1.2 03/23/2020 12:00 AM    CALCIUM 9.9 08/21/2022 02:37 PM    PROT 7.4 05/13/2022 12:00 PM    LABALBU 4.0 05/13/2022 12:00 PM    LABALBU 4.1 03/05/2012 08:54 AM    BILITOT 0.7 05/13/2022 12:00 PM    ALKPHOS 97 05/13/2022 12:00 PM    AST 18 05/13/2022 12:00 PM    ALT 7 05/13/2022 12:00 PM     Lab Results   Component Value Date/Time    LIPASE 27 04/07/2018 10:40 PM     No results found for: AMYLASE      ASSESSMENT/PLAN:  Patient Active Problem List   Diagnosis    Essential hypertension    Inflammatory arthritis    Paroxysmal A-fib (HCC)    Mixed hyperlipidemia    Pacemaker    Type 2 diabetes mellitus with stage 3b chronic kidney disease, without long-term current use of insulin (HCC)    Ichthyosis    Hip fracture requiring operative repair, left, closed, initial encounter [facility]    Stage 3 chronic kidney disease ([facility]    Bilateral leg edema    Blister of right lower leg without infection    Blister of left leg without infection    Ear lesion    Vaccination declined by patient    GIB (gastrointestinal bleeding)     1. GI bleed  -Coffee-ground emesis concerning for upper gastrointestinal source of blood loss  -Consider further evaluation with upper endoscopy -tentatively plan for tomorrow as patient has received mechanical breakfast this morning  -Continue PPI IV but increase to twice a day dosing    2. CRC screening  -According to patient's daughter patient never had colonoscopy  -At this time they do not seem interested in  colonoscopy    3. Elevated troponin  -Per cardiology    Above has been discussed with patient and his daughter at bedside and all questions answered to their satisfaction. They verbalized understanding and agreement with the plan as needed.     Thank you for the opportunity to see this patient in consultation    Chuyita Ji MD  8/22/2022  11:42 AM    NOTE:  This report was transcribed using voice recognition software. Every effort was made to ensure accuracy; however, inadvertent computerized transcription errors may be present.

## 2022-08-22 NOTE — PROGRESS NOTES
[address] [facility]  [facility] [place]. [place]        Date:2022                                                  Patient Name: Vivian Redd    MRN: [id number]    : 1934    Room: [facility]      Evaluating OT: Nina Caraballo OTR/L #[id number]     Referring Provider and Specific Provider Orders/Date:      22  OT eval and treat  Start:  22,   End:  22,   ONE TIME,   Standing Count:  1 Occurrences,   Ian Dubois MD      Placement Recommendation: Subacute vs Home Health Occupational Therapy if patient is able to meet goals        Diagnosis:   1. Gastrointestinal hemorrhage with hematemesis    2. Non-traumatic rhabdomyolysis    3. Cellulitis of right leg    4. Elevated troponin    5.  CHRISTOPHER (acute kidney injury) [facility])         Surgery: None      Pertinent Medical History:       Past Medical History:   Diagnosis Date    A-fib ([address]     Anticoagulant long-term use     Atrial fibrillation (HCC)     BPH (benign prostatic hyperplasia)     CAD (coronary artery disease)     Cataract of left eye 3/11/2014    Cerebrovascular disease     CHF (congestive heart failure) ([address]     Diabetes mellitus ([address]     Dislocated IOL (intraocular lens), posterior 11/15/2013    Hearing loss     Hyperlipidemia     Hypertension     Osteoarthritis     Peripheral vascular disease ([address]     Renal cyst     Right cataract 2013    Type 2 diabetes mellitus with stage 3b chronic kidney disease, without long-term current use of insulin ([address] 2022    Type 2 diabetes mellitus without complication ([address]     Unspecified cerebral artery occlusion with cerebral infarction 10/2010    mild stroke         Past Surgical History:   Procedure Laterality Date    CATARACT REMOVAL WITH IMPLANT Right 2013    CATARACT REMOVAL WITH IMPLANT Left 13    EYE SURGERY Right 11/15/13    dislocated intraocular rail.   Bathroom set-up: Tub/shower with tub transfer bench - patient sits on TTB and sponge bathes only. Equipment owned: wheeled walker, cane, elevated commode. Prior Level of Function: Pt reports being Mod Indep with ADLs , family assists with IADLs; ambulated independently with walker. Driving: Yes   Occupation: N/a   Enjoys: His dog      Pain Level: Pt denied pain; Nursing notified. Cognition: A&O: 3/4 - self, place, and month; Follows 2-3 step directions   Memory: fair    Sequencing: fair    Problem solving: fair    Judgement/safety: fair     [facility]   AM-PAC Daily Activity Inpatient   How much help for putting on and taking off regular lower body clothing?: Total  How much help for Bathing?: A Lot  How much help for Toileting?: A Lot  How much help for putting on and taking off regular upper body clothing?: A Lot  How much help for taking care of personal grooming?: A Little  How much help for eating meals?: A Little  AM-[facility] Inpatient Daily Activity Raw Score: 13  AM-PAC Inpatient ADL T-Scale Score : 32.03  ADL Inpatient CMS 0-100% Score: 63.03  ADL Inpatient CMS G-Code Modifier : CL     Functional Assessment:    Initial Eval Status  Date: 8/22/22   Treatment Status  Date: STGs = LTGs  Time frame: 10-14 days   Feeding Supervision     Independent    Grooming Minimal Assist   For oral care and face shave seated at table-top    Supervision    UB Dressing Moderate Assist    Supervision    LB Dressing Dependent     Minimal Assist    Bathing Moderate Assist     Minimal Assist    Toileting Maximal Assist   Suspected for rear hygiene; set-up for use of urinal     Minimal Assist    Bed Mobility  Supine to sit:  Not Assessed; in chair  Sit to supine:  Not Assessed; in chair     Supine to sit: Independent   Sit to supine: Independent    Functional Transfers Sit to stand:  Moderate Assist to stand from chair   Stand to sit: Minimal Assist     Transfer training with verbal cues for hand placement throughout session to improve safety. Independent    Functional Mobility Minimal Assist with wheeled walker to improve balance within room to simulate household distances, verbal cues for walker sequence and safety. Moderate Thomas with use of wheeled walker    Balance Sitting:     Static: good in chair     Dynamic: good in chair   Standing: fair with walker     Sitting:     Static: good    Dynamic: good  Standing: good    Activity Tolerance fair    Increase standing tolerance >3 minutes for improved engagement with functional transfers and indep in ADLs     Visual/  Perceptual Glasses: Yes     Reports changes in vision since admission: No      NA      Hand Dominance: Right      AROM (PROM) Strength Additional Info:  Goal:   RUE  WFL 3+/5 good  and wfl FMC/dexterity noted during ADL tasks   Improve overall RUE strength  for participation in functional tasks   LUE WFL 3+/5 good  and wfl FMC/dexterity noted during ADL tasks   Improve overall LUE strength  for participation in functional tasks     Hearing: Hard of hearing    Sensation:  No c/o numbness or tingling  Tone:  WFL   Edema: LEs R>L    Comments: RN cleared patient for OT. Upon arrival patient in supine. Therapist facilitated and instructed pt on adapted  techniques & compensatory strategies to improve safety and independence with basic ADLs, bed mobility, functional transfers and mobility to allow pt to achieve highest level of independence and safely. Pt demonstrated fair understanding of education & follow through. At end of session, patient was in chair with call light and phone within reach, all lines and tubes intact. Overall, patient demonstrated  decreased independence and safety during completion of ADL tasks. Pt would benefit from continued skilled OT to increase safety and independence with completion of ADL tasks and functional mobility for improved quality of life.        Treatment: OT treatment provided this date

## 2022-08-22 NOTE — PROGRESS NOTES
cooperative  EYES:  extra-ocular muscles intact and vision intact  ENT:  normocepalic, without obvious abnormality  NECK:  supple, symmetrical, trachea midline  LUNGS:  no increased work of breathing, no retractions, and clear to auscultation  CARDIOVASCULAR:  normal apical pulses and normal S1 and S2  ABDOMEN:  normal bowel sounds, non-distended, and non-tender  MUSCULOSKELETAL:  right lower leg swelling and redness with poor vascular status  NEUROLOGIC:  Mental Status Exam:  Level of Alertness:   awake  SKIN:  no bruising or bleeding  Data    CBC:   Lab Results   Component Value Date/Time    WBC 27.0 08/21/2022 02:37 PM    RBC 4.45 08/21/2022 02:37 PM    HGB 14.3 08/21/2022 02:37 PM    HCT 43.9 08/21/2022 02:37 PM    MCV 98.7 08/21/2022 02:37 PM    MCH 32.1 08/21/2022 02:37 PM    MCHC 32.6 08/21/2022 02:37 PM    RDW 13.5 08/21/2022 02:37 PM     08/21/2022 02:37 PM    MPV 10.5 08/21/2022 02:37 PM     BMP:    Lab Results   Component Value Date/Time     08/21/2022 02:37 PM    K 4.5 08/21/2022 02:37 PM    K 3.9 03/17/2022 04:25 PM     08/21/2022 02:37 PM    CO2 24 08/21/2022 02:37 PM    BUN 33 08/21/2022 02:37 PM    LABALBU 4.0 05/13/2022 12:00 PM    LABALBU 4.1 03/05/2012 08:54 AM    CREATININE 1.6 08/21/2022 02:37 PM    CREATININE 1.2 03/23/2020 12:00 AM    CREATININE 1.2 03/23/2020 12:00 AM    CALCIUM 9.9 08/21/2022 02:37 PM    GFRAA 50 08/21/2022 02:37 PM    LABGLOM 41 08/21/2022 02:37 PM    GLUCOSE 137 08/21/2022 02:37 PM    GLUCOSE 137 03/05/2012 08:54 AM       ASSESSMENT AND PLAN      GIB (gastrointestinal bleeding): hematemesis: GI consulted. On octreotide. GI consuledfor healp with management. Noted concern for liver disease due to prior hx of etoh. Will monitor H and H.   Elevated Troponin. Significantly elevated. Suspected that this may be related to mild Rhabdomyolysis vs infectious process ( elevated WBC) vs Demand Ischemia. Noted improved creatinine. Consult placed to cardiology. Acute Metabolic Encpehalopathy: much improved today. Right lower leg swelling and Redness: concern that this is cellulitis. Continue to abx as ordered  Leukocytosis: concern for sepsis. Continue abx repeat CBC. Hx of atrial fibrillation: rate controlled. Continue to hold eliquis due to GI bleed. Hyperlipidemia: on statin. Will hold due to CK elevation and will restart when enzymes is normalized. Fall: unsure of mechanism. Continue to PT/OT  Acute on chronic Renal disease: seems like baseline creatinine is 1. 3. will trend while in hospital.  DM type 2 Complicated with Nephropathy: continue insulin sliding scale. Dehydration: continue IV fluid. Noted that patient's BP can be labile.  So will be cautious with resuming Home meds  Also will be cautious with IV fluid and will decrease as CK improves

## 2022-08-23 ENCOUNTER — ANESTHESIA EVENT (OUTPATIENT)
Dept: ENDOSCOPY | Age: 87
DRG: 871 | End: 2022-08-23
Payer: MEDICARE

## 2022-08-23 ENCOUNTER — APPOINTMENT (OUTPATIENT)
Dept: CT IMAGING | Age: 87
DRG: 871 | End: 2022-08-23
Payer: MEDICARE

## 2022-08-23 ENCOUNTER — ANESTHESIA (OUTPATIENT)
Dept: ENDOSCOPY | Age: 87
DRG: 871 | End: 2022-08-23
Payer: MEDICARE

## 2022-08-23 LAB
ALBUMIN SERPL-MCNC: 3.1 G/DL (ref 3.5–5.2)
ALP BLD-CCNC: 66 U/L (ref 40–129)
ALT SERPL-CCNC: 11 U/L (ref 0–40)
ANION GAP SERPL CALCULATED.3IONS-SCNC: 10 MMOL/L (ref 7–16)
AST SERPL-CCNC: 43 U/L (ref 0–39)
BASOPHILS ABSOLUTE: 0.05 E9/L (ref 0–0.2)
BASOPHILS RELATIVE PERCENT: 0.3 % (ref 0–2)
BILIRUB SERPL-MCNC: 0.3 MG/DL (ref 0–1.2)
BUN BLDV-MCNC: 26 MG/DL (ref 6–23)
CALCIUM SERPL-MCNC: 9.5 MG/DL (ref 8.6–10.2)
CHLORIDE BLD-SCNC: 104 MMOL/L (ref 98–107)
CO2: 21 MMOL/L (ref 22–29)
CREAT SERPL-MCNC: 1.1 MG/DL (ref 0.7–1.2)
EOSINOPHILS ABSOLUTE: 0.17 E9/L (ref 0.05–0.5)
EOSINOPHILS RELATIVE PERCENT: 0.9 % (ref 0–6)
GFR AFRICAN AMERICAN: >60
GFR NON-AFRICAN AMERICAN: >60 ML/MIN/1.73
GLUCOSE BLD-MCNC: 91 MG/DL (ref 74–99)
HBA1C MFR BLD: 6.7 % (ref 4–5.6)
HCT VFR BLD CALC: 41.7 % (ref 37–54)
HEMOGLOBIN: 13.3 G/DL (ref 12.5–16.5)
IMMATURE GRANULOCYTES #: 0.21 E9/L
IMMATURE GRANULOCYTES %: 1.1 % (ref 0–5)
LYMPHOCYTES ABSOLUTE: 0.78 E9/L (ref 1.5–4)
LYMPHOCYTES RELATIVE PERCENT: 4.2 % (ref 20–42)
MCH RBC QN AUTO: 32.4 PG (ref 26–35)
MCHC RBC AUTO-ENTMCNC: 31.9 % (ref 32–34.5)
MCV RBC AUTO: 101.5 FL (ref 80–99.9)
METER GLUCOSE: 104 MG/DL (ref 74–99)
METER GLUCOSE: 133 MG/DL (ref 74–99)
METER GLUCOSE: 70 MG/DL (ref 74–99)
METER GLUCOSE: 72 MG/DL (ref 74–99)
METER GLUCOSE: 79 MG/DL (ref 74–99)
METER GLUCOSE: 89 MG/DL (ref 74–99)
MONOCYTES ABSOLUTE: 1.01 E9/L (ref 0.1–0.95)
MONOCYTES RELATIVE PERCENT: 5.4 % (ref 2–12)
NEUTROPHILS ABSOLUTE: 16.46 E9/L (ref 1.8–7.3)
NEUTROPHILS RELATIVE PERCENT: 88.1 % (ref 43–80)
PDW BLD-RTO: 14.2 FL (ref 11.5–15)
PLATELET # BLD: 170 E9/L (ref 130–450)
PMV BLD AUTO: 11.1 FL (ref 7–12)
POTASSIUM SERPL-SCNC: 4.6 MMOL/L (ref 3.5–5)
PROCALCITONIN: 4.04 NG/ML (ref 0–0.08)
RBC # BLD: 4.11 E12/L (ref 3.8–5.8)
SODIUM BLD-SCNC: 135 MMOL/L (ref 132–146)
TOTAL CK: 599 U/L (ref 20–200)
TOTAL PROTEIN: 6.7 G/DL (ref 6.4–8.3)
WBC # BLD: 18.7 E9/L (ref 4.5–11.5)

## 2022-08-23 PROCEDURE — 0DJ08ZZ INSPECTION OF UPPER INTESTINAL TRACT, VIA NATURAL OR ARTIFICIAL OPENING ENDOSCOPIC: ICD-10-PCS | Performed by: INTERNAL MEDICINE

## 2022-08-23 PROCEDURE — 80053 COMPREHEN METABOLIC PANEL: CPT

## 2022-08-23 PROCEDURE — 82962 GLUCOSE BLOOD TEST: CPT

## 2022-08-23 PROCEDURE — 83036 HEMOGLOBIN GLYCOSYLATED A1C: CPT

## 2022-08-23 PROCEDURE — 6360000002 HC RX W HCPCS: Performed by: INTERNAL MEDICINE

## 2022-08-23 PROCEDURE — 3700000001 HC ADD 15 MINUTES (ANESTHESIA): Performed by: INTERNAL MEDICINE

## 2022-08-23 PROCEDURE — 6370000000 HC RX 637 (ALT 250 FOR IP): Performed by: INTERNAL MEDICINE

## 2022-08-23 PROCEDURE — A4216 STERILE WATER/SALINE, 10 ML: HCPCS | Performed by: HOSPITALIST

## 2022-08-23 PROCEDURE — 2060000000 HC ICU INTERMEDIATE R&B

## 2022-08-23 PROCEDURE — 6360000002 HC RX W HCPCS: Performed by: HOSPITALIST

## 2022-08-23 PROCEDURE — 73700 CT LOWER EXTREMITY W/O DYE: CPT

## 2022-08-23 PROCEDURE — 2580000003 HC RX 258: Performed by: HOSPITALIST

## 2022-08-23 PROCEDURE — 84145 PROCALCITONIN (PCT): CPT

## 2022-08-23 PROCEDURE — 3700000000 HC ANESTHESIA ATTENDED CARE: Performed by: INTERNAL MEDICINE

## 2022-08-23 PROCEDURE — 3609017100 HC EGD: Performed by: INTERNAL MEDICINE

## 2022-08-23 PROCEDURE — 7100000010 HC PHASE II RECOVERY - FIRST 15 MIN: Performed by: INTERNAL MEDICINE

## 2022-08-23 PROCEDURE — 2580000003 HC RX 258: Performed by: INTERNAL MEDICINE

## 2022-08-23 PROCEDURE — C9113 INJ PANTOPRAZOLE SODIUM, VIA: HCPCS | Performed by: HOSPITALIST

## 2022-08-23 PROCEDURE — 99233 SBSQ HOSP IP/OBS HIGH 50: CPT | Performed by: INTERNAL MEDICINE

## 2022-08-23 PROCEDURE — 85025 COMPLETE CBC W/AUTO DIFF WBC: CPT

## 2022-08-23 PROCEDURE — 7100000011 HC PHASE II RECOVERY - ADDTL 15 MIN: Performed by: INTERNAL MEDICINE

## 2022-08-23 PROCEDURE — 36415 COLL VENOUS BLD VENIPUNCTURE: CPT

## 2022-08-23 PROCEDURE — 82550 ASSAY OF CK (CPK): CPT

## 2022-08-23 PROCEDURE — 6360000002 HC RX W HCPCS: Performed by: NURSE ANESTHETIST, CERTIFIED REGISTERED

## 2022-08-23 PROCEDURE — 2580000003 HC RX 258: Performed by: NURSE ANESTHETIST, CERTIFIED REGISTERED

## 2022-08-23 PROCEDURE — 2709999900 HC NON-CHARGEABLE SUPPLY: Performed by: INTERNAL MEDICINE

## 2022-08-23 RX ORDER — PANTOPRAZOLE SODIUM 40 MG/1
40 TABLET, DELAYED RELEASE ORAL
Status: DISCONTINUED | OUTPATIENT
Start: 2022-08-24 | End: 2022-08-27 | Stop reason: HOSPADM

## 2022-08-23 RX ORDER — PROPOFOL 10 MG/ML
INJECTION, EMULSION INTRAVENOUS PRN
Status: DISCONTINUED | OUTPATIENT
Start: 2022-08-23 | End: 2022-08-23 | Stop reason: SDUPTHER

## 2022-08-23 RX ORDER — SODIUM CHLORIDE 9 MG/ML
INJECTION, SOLUTION INTRAVENOUS CONTINUOUS PRN
Status: DISCONTINUED | OUTPATIENT
Start: 2022-08-23 | End: 2022-08-23 | Stop reason: SDUPTHER

## 2022-08-23 RX ADMIN — PANTOPRAZOLE SODIUM 40 MG: 40 INJECTION, POWDER, FOR SOLUTION INTRAVENOUS at 09:45

## 2022-08-23 RX ADMIN — APIXABAN 5 MG: 5 TABLET, FILM COATED ORAL at 21:46

## 2022-08-23 RX ADMIN — OXYCODONE AND ACETAMINOPHEN 1 TABLET: 5; 325 TABLET ORAL at 14:00

## 2022-08-23 RX ADMIN — WATER 1000 MG: 1 INJECTION INTRAMUSCULAR; INTRAVENOUS; SUBCUTANEOUS at 17:57

## 2022-08-23 RX ADMIN — PROPOFOL 20 MG: 10 INJECTION, EMULSION INTRAVENOUS at 16:40

## 2022-08-23 RX ADMIN — PROPOFOL 10 MG: 10 INJECTION, EMULSION INTRAVENOUS at 16:43

## 2022-08-23 RX ADMIN — SODIUM CHLORIDE: 900 INJECTION, SOLUTION INTRAVENOUS at 16:27

## 2022-08-23 RX ADMIN — TIMOLOL MALEATE 1 DROP: 5 SOLUTION OPHTHALMIC at 09:45

## 2022-08-23 RX ADMIN — TIMOLOL MALEATE 1 DROP: 5 SOLUTION OPHTHALMIC at 21:39

## 2022-08-23 RX ADMIN — OXYCODONE AND ACETAMINOPHEN 1 TABLET: 5; 325 TABLET ORAL at 02:30

## 2022-08-23 RX ADMIN — BRIMONIDINE TARTRATE 1 DROP: 2 SOLUTION OPHTHALMIC at 09:45

## 2022-08-23 RX ADMIN — OXYCODONE AND ACETAMINOPHEN 1 TABLET: 5; 325 TABLET ORAL at 09:45

## 2022-08-23 RX ADMIN — PROPOFOL 30 MG: 10 INJECTION, EMULSION INTRAVENOUS at 16:38

## 2022-08-23 ASSESSMENT — PAIN DESCRIPTION - DESCRIPTORS
DESCRIPTORS: ACHING
DESCRIPTORS: ACHING;SORE;HEAVINESS
DESCRIPTORS: ACHING;SORE

## 2022-08-23 ASSESSMENT — PAIN DESCRIPTION - PAIN TYPE
TYPE: ACUTE PAIN
TYPE: ACUTE PAIN

## 2022-08-23 ASSESSMENT — PAIN SCALES - GENERAL
PAINLEVEL_OUTOF10: 7

## 2022-08-23 ASSESSMENT — PAIN DESCRIPTION - LOCATION
LOCATION: LEG
LOCATION: BACK
LOCATION: LEG

## 2022-08-23 ASSESSMENT — LIFESTYLE VARIABLES: SMOKING_STATUS: 0

## 2022-08-23 NOTE — CARE COORDINATION
SS NOTE: COVID NEGATIVE 8/21. Pt is on room air. Pt has a peripheral line. He is on IV Protonix, Sandostatin and Rocephin. Pt has a wound on his left wrist. Pt was on Eliquis pta. Cardiology is on. PT/OT are on  And suggest SNF VS HHC- pt went 60 feet twice with a ww min assist. Pt having an EGD today. Pt did have [facility] with and unknown [facility] agency pta to dress his leg wounds twice a week when home. Pt is refusing SNF. Pt does have a hx of [facility] after a hip fx in March 2022. Dtr Frank Rachel will provide transport home at [facility]. SS to continue. PATY Saenz.8/23/2022.12:31 PM.

## 2022-08-23 NOTE — PLAN OF CARE
Problem: Skin/Tissue Integrity  Goal: Absence of new skin breakdown  Description: 1. Monitor for areas of redness and/or skin breakdown  2. Assess vascular access sites hourly  3. Every 4-6 hours minimum:  Change oxygen saturation probe site  4. Every 4-6 hours:  If on nasal continuous positive airway pressure, respiratory therapy assess nares and determine need for appliance change or resting period. 8/23/2022 0242 by Gamal Santacruz RN  Outcome: Not Progressing     Problem: ABCDS Injury Assessment  Goal: Absence of physical injury  8/23/2022 0242 by Gamal Santacruz RN  Outcome: Progressing     Problem: Safety - Adult  Goal: Free from fall injury  8/23/2022 0242 by Gamal Santacruz RN  Outcome: Progressing     Problem: Chronic Conditions and Co-morbidities  Goal: Patient's chronic conditions and co-morbidity symptoms are monitored and maintained or improved  8/23/2022 0242 by Gamal Santacruz RN  Outcome: Progressing     Problem: Skin/Tissue Integrity  Goal: Absence of new skin breakdown  Description: 1. Monitor for areas of redness and/or skin breakdown  2. Assess vascular access sites hourly  3. Every 4-6 hours minimum:  Change oxygen saturation probe site  4. Every 4-6 hours:  If on nasal continuous positive airway pressure, respiratory therapy assess nares and determine need for appliance change or resting period.   8/23/2022 0242 by Gamal Santacruz RN  Outcome: Not Progressing  8/22/2022 1942 by Heriberto Maldonado RN  Outcome: Progressing N/A

## 2022-08-23 NOTE — PROGRESS NOTES
Department of Internal Medicine  General Internal Medicine  Attending Progress Note  Chief Complaint   Patient presents with    Fall     SUBJECTIVE:    Reports that he is doing better. Noted that his leg is still swollen more than yesterday. Overall redness is improved. I did explain to him that his CT of the leg showed no necrosis, but do show inflammation and calcifications. I told him that I am unsure if if the calcification is contributing to the right leg swelling. He is aware of plans to continue to IV fluid and abx.     OBJECTIVE      Medications    Current Facility-Administered Medications: apixaban (ELIQUIS) tablet 5 mg, 5 mg, Oral, BID  pantoprazole (PROTONIX) 40 mg in sodium chloride (PF) 10 mL injection, 40 mg, IntraVENous, Q12H  oxyCODONE-acetaminophen (PERCOCET) 5-325 MG per tablet 1 tablet, 1 tablet, Oral, Q4H PRN  sodium chloride flush 0.9 % injection 5-40 mL, 5-40 mL, IntraVENous, 2 times per day  sodium chloride flush 0.9 % injection 5-40 mL, 5-40 mL, IntraVENous, PRN  0.9 % sodium chloride infusion, , IntraVENous, PRN  ondansetron (ZOFRAN-ODT) disintegrating tablet 4 mg, 4 mg, Oral, Q8H PRN **OR** ondansetron (ZOFRAN) injection 4 mg, 4 mg, IntraVENous, Q6H PRN  polyethylene glycol (GLYCOLAX) packet 17 g, 17 g, Oral, Daily PRN  acetaminophen (TYLENOL) tablet 650 mg, 650 mg, Oral, Q6H PRN **OR** acetaminophen (TYLENOL) suppository 650 mg, 650 mg, Rectal, Q6H PRN  perflutren lipid microspheres (DEFINITY) injection 1.65 mg, 1.5 mL, IntraVENous, ONCE PRN  0.9 % sodium chloride infusion, , IntraVENous, Continuous  [Held by provider] pravastatin (PRAVACHOL) tablet 40 mg, 40 mg, Oral, Nightly  insulin lispro (HUMALOG) injection vial 0-4 Units, 0-4 Units, SubCUTAneous, TID WC  insulin lispro (HUMALOG) injection vial 0-4 Units, 0-4 Units, SubCUTAneous, Nightly  brimonidine (ALPHAGAN) 0.2 % ophthalmic solution 1 drop, 1 drop, Both Eyes, Q12H **AND** timolol (TIMOPTIC) 0.5 % ophthalmic solution 1 drop, 1 drop, Both Eyes, Q12H  cefTRIAXone (ROCEPHIN) 1,000 mg in sterile water 10 mL IV syringe, 1,000 mg, IntraVENous, Daily  octreotide (SANDOSTATIN) 500 mcg in sodium chloride 0.9 % 100 mL infusion, 25 mcg/hr, IntraVENous, Continuous  Physical    VITALS:  BP (!) 140/80   Pulse 62   Temp 97.6 °F (36.4 °C) (Oral)   Resp 18   Ht 5' 9\" (1.753 m)   Wt 180 lb (81.6 kg)   SpO2 95%   BMI 26.58 kg/m²   CONSTITUTIONAL:  awake, cooperative, and no apparent distress  EYES:  extra-ocular muscles intact and vision intact  ENT:  normocepalic, without obvious abnormality  NECK:  supple, symmetrical, trachea midline  HEMATOLOGIC/LYMPHATICS:  no cervical lymphadenopathy  LUNGS:  no increased work of breathing, no retractions, and clear to auscultation  CARDIOVASCULAR:  normal apical pulses and normal S1 and S2  ABDOMEN:  normal bowel sounds, non-distended, and non-tender  MUSCULOSKELETAL:  right leg swelling  NEUROLOGIC:  Mental Status Exam:  Level of Alertness:   awake  Orientation:   person, place, time  SKIN:  no bruising or bleeding  Data    CBC:   Lab Results   Component Value Date/Time    WBC 18.7 08/23/2022 09:00 AM    RBC 4.11 08/23/2022 09:00 AM    HGB 13.3 08/23/2022 09:00 AM    HCT 41.7 08/23/2022 09:00 AM    .5 08/23/2022 09:00 AM    MCH 32.4 08/23/2022 09:00 AM    MCHC 31.9 08/23/2022 09:00 AM    RDW 14.2 08/23/2022 09:00 AM     08/23/2022 09:00 AM    MPV 11.1 08/23/2022 09:00 AM     BMP:    Lab Results   Component Value Date/Time     08/23/2022 09:00 AM    K 4.6 08/23/2022 09:00 AM    K 3.9 03/17/2022 04:25 PM     08/23/2022 09:00 AM    CO2 21 08/23/2022 09:00 AM    BUN 26 08/23/2022 09:00 AM    LABALBU 3.1 08/23/2022 09:00 AM    LABALBU 4.1 03/05/2012 08:54 AM    CREATININE 1.1 08/23/2022 09:00 AM    CREATININE 1.2 03/23/2020 12:00 AM    CREATININE 1.2 03/23/2020 12:00 AM    CALCIUM 9.5 08/23/2022 09:00 AM    GFRAA >60 08/23/2022 09:00 AM    LABGLOM >60 08/23/2022 09:00 AM    GLUCOSE 91 08/23/2022 09:00 AM    GLUCOSE 137 03/05/2012 08:54 AM       ASSESSMENT AND PLAN    GIB (gastrointestinal bleeding): Hgb has remained stable. Await GI recommendations. Cardiology evaluated patient and no further cardiac work up for now. On PPI  Elevated trop. Suspected to be demand ischemia. Trop trended down. Right lower leg cellulitis: improving. Continue ceftriaxone. Gave 1 time dose of vanco due to concern for nec fasc. However, CT of lower leg was negative  Hx of atrial fibrillation: on eliquis. Since hgb is stable. DM type 2: better controlled. Continue insulin sliding scale. A1C is 6. 6. will continue glipizide at discharge  CHRISTOPHER: renal function is much improved. Continue IV fluid  Rhabdomyolysis: much improved. Ck is 599. Will continue IV fluid. Continue to hold Pravastatin. Will resume before discharge  HL: statin as above  PAD/CVA: continue secondary prophylaxis. PAD: CT scan with multiple calcifications. Since renal function has improved, he can tolerate Contrast  Fall/debility: PT/OT.

## 2022-08-23 NOTE — OP NOTE
Operative Note      Patient: Sukhdev Raya  YOB: 1934  MRN: [id number]    Date of Procedure: 8/23/2022    Pre-Op Diagnosis: Anemia, unspecified type [D64.9]    Post-Op Diagnosis:  mild gastritis       Procedure(s):  EGD ESOPHAGOGASTRODUODENOSCOPY    Surgeon(s):  Arsen Jenkins MD    Assistant:   Surgical Assistant: Nancy Lacy RN; Brandy Medley RN    Anesthesia: Monitor Anesthesia Care    Estimated Blood Loss (mL): 0 ml    Complications: None    Specimens:   * No specimens in log *    Implants:  * No implants in log *      Drains: * No LDAs found *    Findings: mild gastritis    Detailed Description of Procedure: Indication    Sedation  MAC    Endoscope was advanced easily through mouth to second portion of duodenum      Oropharynx views are limited but grossly normal.    Esophagus:   Mucosa is normal.  GEJ at 40 cm. Stomach:   Antrum is normal    Gastric body is normal.    Retroflexed views show normal fundus and cardia. Duodenum: Bulb is normal.    Second portion of duodenum is normal.    IMPRESSION AND PLAN: Normal upper endoscopy. Resume diet. Will follow.       Electronically signed by Arsen Jenkins MD on 8/23/2022 at 4:45 PM

## 2022-08-23 NOTE — CARE COORDINATION
SS NOTE: COVID NEGATIVE 8/21. Pt is on room air. Pt has a peripheral line. He is on IV Protonix, Sandostatin and Rocephin. Pt has a wound on his left wrist. Pt was on Eliquis pta. Cardiology is on. PT/OT are on  And suggest SNF VS HHC- pt went 60 feet twice with a ww min assist. Pt having an EGD today. Pt did have [address] with and unknown [address] agency pta to dress his leg wounds twice a week when home. Pt is refusing SNF. Pt does have a hx of [facility] [place] after a hip fx in March 2022. CHELY met with pt dtr Mary Portillo today at her request. She reiterated the complete story again to this SW that was told to previous CM yesterday. She is extremely hopeful that pt will change his mind about going to a SNF. SW will continue to attempt to convince pt of this need. Dtr Mary Portillo will provide transport home at [facility]. SS to continue. Lucina Holt. 4:21 PM.

## 2022-08-23 NOTE — ANESTHESIA POSTPROCEDURE EVALUATION
Department of Anesthesiology  Postprocedure Note    Patient: Lucas Singh  MRN: [id number]  YOB: 1934  Date of evaluation: 8/23/2022      Procedure Summary     Date: 08/23/22 Room / Location: [address] [place] [address] / [address]    Anesthesia Start: 1627 Anesthesia Stop: 3736    Procedure: EGD ESOPHAGOGASTRODUODENOSCOPY Diagnosis:       Anemia, unspecified type      (Anemia, unspecified type [D64.9])    Surgeons: Agnieszka Glasgow MD Responsible Provider: Roxy Woo MD    Anesthesia Type: MAC ASA Status: 3          Anesthesia Type: No value filed.     Fran Phase I:      Fran Phase II: Fran Score: 8      Anesthesia Post Evaluation    Patient location during evaluation: PACU  Patient participation: complete - patient participated  Level of consciousness: awake  Pain score: 3  Airway patency: patent  Nausea & Vomiting: no nausea  Complications: no  Cardiovascular status: blood pressure returned to baseline  Respiratory status: acceptable  Hydration status: euvolemic

## 2022-08-23 NOTE — PROGRESS NOTES
CT department notified of STAT imaging ordered for Devante Calvin. CT staff apparently backed up due to ED imaging. CT staff to call when available for imaging. Dr. Debi Leach aware.

## 2022-08-23 NOTE — PROGRESS NOTES
This nurse called to patient's room who states he accidentally sat on iv tubing and \"it fell out\", site clean and 2x2 placed. This nurse attempted x 2 to place 22g and was unsuccessful, patient refuses to have any ivs placed at this time by this nurse or another. This nurse explained to patient the importance of needing the iv and current medication therapy, patient still refuse. Will pass to next RN to attempt at later time.

## 2022-08-23 NOTE — ANESTHESIA PRE PROCEDURE
Department of Anesthesiology  Preprocedure Note       Name:  Samara Wolf   Age:  80 y.o.  :  1934                                          MRN:  [id number]         Date:  2022      Surgeon: Jacqueline Alicea):  Isaac Milian MD    Procedure: Procedure(s):  EGD ESOPHAGOGASTRODUODENOSCOPY    Medications prior to admission:   Prior to Admission medications    Medication Sig Start Date End Date Taking? Authorizing Provider   apixaban (ELIQUIS) 2.5 MG TABS tablet Take 1 tablet by mouth 2 times daily 22   Orly Apo, APRN - CNP   pravastatin (PRAVACHOL) 40 MG tablet Take 1 tablet by mouth at bedtime Note increase in dose 22   Orly Apo, APRN - CNP   ramipril (ALTACE) 2.5 MG capsule Take 1 capsule by mouth daily 22  Orly Apo, APRN - CNP   furosemide (LASIX) 20 MG tablet Take 1 tablet by mouth daily 22   Orly Apo, APRN - CNP   glipiZIDE (GLUCOTROL XL) 2.5 MG extended release tablet Take 1 tablet by mouth daily 22   Orly Apo, APRN - CNP   brimonidine-timolol (COMBIGAN) 0.2-0.5 % ophthalmic solution Place 1 drop into the right eye every 12 hours.  13   Roberto Feliz MD       Current medications:    Current Facility-Administered Medications   Medication Dose Route Frequency Provider Last Rate Last Admin    apixaban (ELIQUIS) tablet 5 mg  5 mg Oral BID Sammie Hammond MD        pantoprazole (PROTONIX) 40 mg in sodium chloride (PF) 10 mL injection  40 mg IntraVENous Q12H Rebekah Rubio MD   40 mg at 22 0945    oxyCODONE-acetaminophen (PERCOCET) 5-325 MG per tablet 1 tablet  1 tablet Oral Q4H PRN Jennifer Hill MD   1 tablet at 22 1400    sodium chloride flush 0.9 % injection 5-40 mL  5-40 mL IntraVENous 2 times per day Spike Mccall MD   10 mL at 22    sodium chloride flush 0.9 % injection 5-40 mL  5-40 mL IntraVENous PRN Spike Mccall MD        0.9 % sodium chloride infusion   IntraVENous PRN Spike Mccall MD  ondansetron (ZOFRAN-ODT) disintegrating tablet 4 mg  4 mg Oral Q8H PRN Hunter Sheffield MD        Or    [facility]ron [facility] injection 4 mg  4 mg IntraVENous Q6H PRN Hunter Sheffield MD        polyethylene glycol [facility]) packet 17 g  17 g Oral Daily PRN Hunter Sheffield MD        acetaminophen (TYLENOL) tablet 650 mg  650 mg Oral Q6H PRN Hunter Sheffield MD   650 mg at 08/22/22 1038    Or    acetaminophen (TYLENOL) suppository 650 mg  650 mg Rectal Q6H PRN Hunter Sheffield MD        perflutren lipid microspheres (DEFINITY) injection 1.65 mg  1.5 mL IntraVENous ONCE PRN Hunter Sheffield MD        0.9 % sodium chloride infusion   IntraVENous Continuous Gabriella Rodriguez MD 50 mL/hr at 08/23/22 1331 Rate Change at 08/23/22 1331    [Held by provider] pravastatin (PRAVACHOL) tablet 40 mg  40 mg Oral Nightly Hunter Sheffield MD   40 mg at 08/21/22 2128    insulin lispro (HUMALOG) injection vial 0-4 Units  0-4 Units SubCUTAneous TID WC Hunter Sheffield MD        insulin lispro (HUMALOG) injection vial 0-4 Units  0-4 Units SubCUTAneous Nightly Hunter Sheffield MD        brimonidine (ALPHAGAN) 0.2 % ophthalmic solution 1 drop  1 drop Both Eyes Q12H Hunter Sheffield MD   1 drop at 08/23/22 0945    And    timolol (TIMOPTIC) 0.5 % ophthalmic solution 1 drop  1 drop Both Eyes Q12H Hunter Sheffield MD   1 drop at 08/23/22 0945    cefTRIAXone (ROCEPHIN) 1,000 mg in sterile water 10 mL IV syringe  1,000 mg IntraVENous Daily Hunter Sheffield MD   1,000 mg at 08/22/22 1720    octreotide (SANDOSTATIN) 500 mcg in sodium chloride 0.9 % 100 mL infusion  25 mcg/hr IntraVENous Continuous Hunter Sheffield MD 5 mL/hr at 08/21/22 2044 25 mcg/hr at 08/21/22 2044       Allergies:     Allergies   Allergen Reactions    Lipitor [Atorvastatin Calcium] Other (See Comments)     Muscle cramps    Vytorin [Ezetimibe-Simvastatin] Other (See Comments)     Leg Cramps         Problem List:    Patient Active Problem List   Diagnosis Code    Essential hypertension I10    Inflammatory arthritis M19.90    Paroxysmal A-fib ([facility]) I48.0    Mixed hyperlipidemia E78.2    Pacemaker Z95.0    Type 2 diabetes mellitus with stage 3b chronic kidney disease, without long-term current use of insulin ([facility]) E11.22, N18.32    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    Hip fracture requiring operative repair, left, closed, initial encounter ([facility] S72.002A    Stage 3 chronic kidney disease ([facility] N18.30    Bilateral leg edema R60.0    Blister of right lower leg without infection S80.821A    Blister of left leg without infection S80.822A    Ear lesion H93.90    Vaccination declined by patient Z28.21    GIB (gastrointestinal bleeding) K92.2       Past Medical History:        Diagnosis Date    A-fib ([facility]     Anticoagulant long-term use     Atrial fibrillation ([facility])     BPH (benign prostatic hyperplasia)     CAD (coronary artery disease)     Cataract of left eye 3/11/2014    Cerebrovascular disease     CHF (congestive heart failure) ([facility])     Diabetes mellitus ([facility])     Dislocated IOL (intraocular lens), posterior 11/15/2013    Hearing loss     Hyperlipidemia     Hypertension     Osteoarthritis     Peripheral vascular disease ([facility]     Renal cyst     Right cataract 11/12/2013    Type 2 diabetes mellitus with stage 3b chronic kidney disease, without long-term current use of insulin [facility] 2/22/2022    Type 2 diabetes mellitus without complication ([facility])     Unspecified cerebral artery occlusion with cerebral infarction 10/2010    mild stroke       Past Surgical History:        Procedure Laterality Date    CATARACT REMOVAL WITH IMPLANT Right 11 12 2013    CATARACT REMOVAL WITH IMPLANT Left 03/11/13    EYE SURGERY Right 11/15/13    dislocated intraocular lens    JOINT REPLACEMENT      left hip       Social History:    Social History     Tobacco Use    Smoking status: Former     Packs/day: 1.00     Years: 30.00     Pack years: 30.00     Types: Cigarettes     Quit date: 3/17/1998     Years since quittin.4    Smokeless tobacco: Former     Quit date: 2015   Substance Use Topics    Alcohol use: Yes     Comment: 2 beers daily                                Counseling given: Not Answered      Vital Signs (Current):   Vitals:    22 1650 22 1651 22 1653 22 1659   BP: (!) 97/59  (!) 119/57 (!) 117/59   Pulse: 62 64 63 60   Resp: 19 15 14 11   Temp:       TempSrc:       SpO2: 96% 97% 99% 99%   Weight:       Height:                                                  BP Readings from Last 3 Encounters:   22 (!) 117/59   22 120/74   22 120/70       NPO Status:                                                                                 BMI:   Wt Readings from Last 3 Encounters:   22 180 lb (81.6 kg)   22 171 lb (77.6 kg)   22 187 lb 11.2 oz (85.1 kg)     Body mass index is 26.58 kg/m².     CBC:   Lab Results   Component Value Date/Time    WBC 18.7 2022 09:00 AM    RBC 4.11 2022 09:00 AM    HGB 13.3 2022 09:00 AM    HCT 41.7 2022 09:00 AM    .5 2022 09:00 AM    RDW 14.2 2022 09:00 AM     2022 09:00 AM       CMP:   Lab Results   Component Value Date/Time     2022 09:00 AM    K 4.6 2022 09:00 AM    K 3.9 2022 04:25 PM     2022 09:00 AM    CO2 21 2022 09:00 AM    BUN 26 2022 09:00 AM    CREATININE 1.1 2022 09:00 AM    CREATININE 1.2 2020 12:00 AM    CREATININE 1.2 2020 12:00 AM    GFRAA >60 2022 09:00 AM    LABGLOM >60 2022 09:00 AM    GLUCOSE 91 2022 09:00 AM    GLUCOSE 137 2012 08:54 AM    PROT 6.7 2022 09:00 AM    CALCIUM 9.5 2022 09:00 AM    BILITOT 0.3 2022 09:00 AM    ALKPHOS 66 2022 09:00 AM    AST 43 2022 09:00 AM    ALT 11 2022 09:00 AM       POC Tests: No results for input(s): POCGLU, POCNA, POCK, POCCL, POCBUN, POCHEMO, POCHCT in the last 72 hours. Coags:   Lab Results   Component Value Date/Time    PROTIME 16.6 08/22/2022 08:58 AM    PROTIME 12.3 09/15/2020 12:00 AM    INR 1.4 08/22/2022 08:58 AM    APTT 34.9 03/17/2022 04:25 PM       HCG (If Applicable): No results found for: PREGTESTUR, PREGSERUM, HCG, HCGQUANT     ABGs: No results found for: PHART, PO2ART, CLD5LDJ, AKF9GRX, BEART, E2YPJRKF     Type & Screen (If Applicable):  No results found for: LABABO, LABRH    Drug/Infectious Status (If Applicable):  No results found for: HIV, HEPCAB    COVID-19 Screening (If Applicable):   Lab Results   Component Value Date/Time    COVID19 Not Detected 08/21/2022 04:10 PM           Anesthesia Evaluation    Airway: Mallampati: II  TM distance: >3 FB   Neck ROM: full  Mouth opening: > = 3 FB   Dental: normal exam         Pulmonary: breath sounds clear to auscultation                             Cardiovascular:    (+) hypertension:, pacemaker: pacemaker, CAD:, CHF:,         Rhythm: regular  Rate: normal                    Neuro/Psych:               GI/Hepatic/Renal:   (+) renal disease: CRI,           Endo/Other:    (+) DiabetesType II DM, , : arthritis:., .                 Abdominal:       Abdomen: soft. Vascular: Other Findings:           Anesthesia Plan      MAC     ASA 3       Induction: intravenous. Anesthetic plan and risks discussed with patient.                         Edy Mcgee MD   8/23/2022

## 2022-08-23 NOTE — ANESTHESIA PRE PROCEDURE
Department of Anesthesiology  Preprocedure Note       Name:  Aamir Ludwig   Age:  80 y.o.  :  1934                                          MRN:  [id number]         Date:  2022      Surgeon: Chi Perera):  Reyna Berg MD    Procedure: Procedure(s):  EGD ESOPHAGOGASTRODUODENOSCOPY    Medications prior to admission:   Prior to Admission medications    Medication Sig Start Date End Date Taking? Authorizing Provider   apixaban (ELIQUIS) 2.5 MG TABS tablet Take 1 tablet by mouth 2 times daily 22   LAURENCE Reddy CNP   pravastatin (PRAVACHOL) 40 MG tablet Take 1 tablet by mouth at bedtime Note increase in dose 22   LAURENCE Reddy CNP   ramipril (ALTACE) 2.5 MG capsule Take 1 capsule by mouth daily 22  LAURENCE Reddy CNP   furosemide (LASIX) 20 MG tablet Take 1 tablet by mouth daily 22   LAURENCE Reddy CNP   glipiZIDE (GLUCOTROL XL) 2.5 MG extended release tablet Take 1 tablet by mouth daily 22   LAURENCE Reddy CNP   brimonidine-timolol (COMBIGAN) 0.2-0.5 % ophthalmic solution Place 1 drop into the right eye every 12 hours.  13   Jenny Jimenez MD       Current medications:    Current Facility-Administered Medications   Medication Dose Route Frequency Provider Last Rate Last Admin    apixaban (ELIQUIS) tablet 5 mg  5 mg Oral BID Davon Gonsalez MD        pantoprazole (PROTONIX) 40 mg in sodium chloride (PF) 10 mL injection  40 mg IntraVENous Q12H Jen Collier MD   40 mg at 22 0945    oxyCODONE-acetaminophen (PERCOCET) 5-325 MG per tablet 1 tablet  1 tablet Oral Q4H PRN Sivan Schultz MD   1 tablet at 22 1400    sodium chloride flush 0.9 % injection 5-40 mL  5-40 mL IntraVENous 2 times per day Kem Oliva MD   10 mL at 22    sodium chloride flush 0.9 % injection 5-40 mL  5-40 mL IntraVENous PRN Kem Oliva MD        0.9 % sodium chloride infusion   IntraVENous PRN Kem Oliva MD  ondansetron (ZOFRAN-ODT) disintegrating tablet 4 mg  4 mg Oral Q8H PRN Debra Becerra MD        Or    ondansetron [facility]) injection 4 mg  4 mg IntraVENous Q6H PRN Debra Becerra MD        polyethylene glycol Shruthi ) packet 17 g  17 g Oral Daily PRN Debra Becerra MD        acetaminophen (TYLENOL) tablet 650 mg  650 mg Oral Q6H PRN Debra Becerra MD   650 mg at 08/22/22 1038    Or    acetaminophen (TYLENOL) suppository 650 mg  650 mg Rectal Q6H PRN eDbra Becerra MD        perflutren lipid microspheres (DEFINITY) injection 1.65 mg  1.5 mL IntraVENous ONCE PRN Debra Becerra MD        0.9 % sodium chloride infusion   IntraVENous Continuous Ronald Mcnamara MD 50 mL/hr at 08/23/22 1331 Rate Change at 08/23/22 1331    [Held by provider] pravastatin (PRAVACHOL) tablet 40 mg  40 mg Oral Nightly Debra Becerra MD   40 mg at 08/21/22 2128    insulin lispro (HUMALOG) injection vial 0-4 Units  0-4 Units SubCUTAneous TID WC Debra Becerra MD        insulin lispro (HUMALOG) injection vial 0-4 Units  0-4 Units SubCUTAneous Nightly Debra Becerra MD        brimonidine (ALPHAGAN) 0.2 % ophthalmic solution 1 drop  1 drop Both Eyes Q12H Debra Becerra MD   1 drop at 08/23/22 0945    And    timolol (TIMOPTIC) 0.5 % ophthalmic solution 1 drop  1 drop Both Eyes Q12H Debra Becerra MD   1 drop at 08/23/22 0945    cefTRIAXone (ROCEPHIN) 1,000 mg in sterile water 10 mL IV syringe  1,000 mg IntraVENous Daily Debra Becerra MD   1,000 mg at 08/22/22 1720    octreotide (SANDOSTATIN) 500 mcg in sodium chloride 0.9 % 100 mL infusion  25 mcg/hr IntraVENous Continuous Debra Becerra MD 5 mL/hr at 08/21/22 2044 25 mcg/hr at 08/21/22 2044       Allergies:     Allergies   Allergen Reactions    Lipitor [Atorvastatin Calcium] Other (See Comments)     Muscle cramps    Vytorin [Ezetimibe-Simvastatin] Other (See Comments)     Leg Cramps         Problem List:    Patient Active Problem List   Diagnosis Code    Essential hypertension I10    Inflammatory arthritis M19.90    Paroxysmal A-fib ([facility]) I48.0    Mixed hyperlipidemia E78.2    Pacemaker Z95.0    Type 2 diabetes mellitus with stage 3b chronic kidney disease, without long-term current use of insulin ([facility]) E11.22, N18.32    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    Hip fracture requiring operative repair, left, closed, initial encounter ([facility] S72.002A    Stage 3 chronic kidney disease ([facility] N18.30    Bilateral leg edema R60.0    Blister of right lower leg without infection S80.821A    Blister of left leg without infection S80.822A    Ear lesion H93.90    Vaccination declined by patient Z28.21    GIB (gastrointestinal bleeding) K92.2       Past Medical History:        Diagnosis Date    A-fib ([facility]     Anticoagulant long-term use     Atrial fibrillation ([facility])     BPH (benign prostatic hyperplasia)     CAD (coronary artery disease)     Cataract of left eye 3/11/2014    Cerebrovascular disease     CHF (congestive heart failure) ([facility])     Diabetes mellitus ([facility])     Dislocated IOL (intraocular lens), posterior 11/15/2013    Hearing loss     Hyperlipidemia     Hypertension     Osteoarthritis     Peripheral vascular disease ([facility]     Renal cyst     Right cataract 11/12/2013    Type 2 diabetes mellitus with stage 3b chronic kidney disease, without long-term current use of insulin [facility] 2/22/2022    Type 2 diabetes mellitus without complication ([facility])     Unspecified cerebral artery occlusion with cerebral infarction 10/2010    mild stroke       Past Surgical History:        Procedure Laterality Date    CATARACT REMOVAL WITH IMPLANT Right 11 12 2013    CATARACT REMOVAL WITH IMPLANT Left 03/11/13    EYE SURGERY Right 11/15/13    dislocated intraocular lens    JOINT REPLACEMENT      left hip       Social History:    Social History     Tobacco Use    Smoking status: Former     Packs/day: 1.00     Years: 30.00     Pack years: 30.00     Types: Cigarettes     Quit date: 3/17/1998     Years since quittin.4    Smokeless tobacco: Former     Quit date: 2015   Substance Use Topics    Alcohol use: Yes     Comment: 2 beers daily                                Counseling given: Not Answered      Vital Signs (Current):   Vitals:    22 1707 22 2100 22 0215 22 0930   BP: 135/82 106/74 120/67 (!) 140/80   Pulse: 61 62 65 62   Resp:    Temp: 97.3 °F (36.3 °C) 98.1 °F (36.7 °C) 97.9 °F (36.6 °C) 97.6 °F (36.4 °C)   TempSrc: Oral Oral Oral Oral   SpO2:  95% 94% 95%   Weight:       Height:                                                  BP Readings from Last 3 Encounters:   22 (!) 140/80   22 120/74   22 120/70       NPO Status:                                                                                 BMI:   Wt Readings from Last 3 Encounters:   22 180 lb (81.6 kg)   22 171 lb (77.6 kg)   22 187 lb 11.2 oz (85.1 kg)     Body mass index is 26.58 kg/m².     CBC:   Lab Results   Component Value Date/Time    WBC 18.7 2022 09:00 AM    RBC 4.11 2022 09:00 AM    HGB 13.3 2022 09:00 AM    HCT 41.7 2022 09:00 AM    .5 2022 09:00 AM    RDW 14.2 2022 09:00 AM     2022 09:00 AM       CMP:   Lab Results   Component Value Date/Time     2022 09:00 AM    K 4.6 2022 09:00 AM    K 3.9 2022 04:25 PM     2022 09:00 AM    CO2 21 2022 09:00 AM    BUN 26 2022 09:00 AM    CREATININE 1.1 2022 09:00 AM    CREATININE 1.2 2020 12:00 AM    CREATININE 1.2 2020 12:00 AM    GFRAA >60 2022 09:00 AM    LABGLOM >60 2022 09:00 AM    GLUCOSE 91 2022 09:00 AM    GLUCOSE 137 2012 08:54 AM    PROT 6.7 2022 09:00 AM    CALCIUM 9.5 2022 09:00 AM    BILITOT 0.3 2022 09:00 AM    ALKPHOS 66 2022 09:00 AM    AST 43 2022 09:00 AM    ALT 11 2022 09:00 AM       POC Tests: No

## 2022-08-23 NOTE — CONSULTS
[address]                                  CONSULTATION    PATIENT NAME: Carroll Mclaughlin                     :        1934  MED REC NO:   [id number]                            ROOM:       9752  ACCOUNT NO:   [de-identified]                           ADMIT DATE: 2022  PROVIDER:     Drew Camejo MD    CONSULT DATE:  2022    HISTORY OF PRESENT ILLNESS:  The patient is a 80-year-old gentleman  known to me from before. He has problem with chronic atrial fibrillation with bradycardia. He  underwent permanent pacemaker in 2017. He was brought to the hospital this time as he was found to be on the  floor by his neighbor. He was in a state of confusion. He was admitted. His troponin high-sensitivity came elevated. Cardiac  consult was requested. I came to see the patient early this morning. He was lying flat in bed. He denied chest pain. He did not look as sharp as he used to be,  however. He was able to answer simple questions easily. PAST MEDICAL HISTORY:  As above plus history of type 2 diabetes and  hyperlipidemia for a long time. Hypertension. Chronic atrial  fibrillation. History of mini stroke in the past.  Hearing impairment. Arthritis in his lower extremities. HABITS:  He quit smoking about 25 years ago. He was never a heavy  alcohol drinker. REVIEW OF SYSTEMS:  CONSTITUTIONAL:  Generalized body weakness. No fever or chills. HENT:  No nosebleed. The patient has some hearing problem. No double  vision. No stridor. No hoarseness of the voice. CARDIAC:  No chest pain. Chronic atrial fibrillation. Presence of  pacemaker. PULMONARY:  No shortness of breath at rest.  No cough. GASTROENTEROLOGY:  No abdominal pain. No vomiting. No diarrhea. GENITOURINARY:  No dysuria, frequency. No bladder or kidney tumor. NEURO:  No clear history of seizure.   He had mini stroke in the past.   No syncope. HEMATOLOGY:  No bleeding disorder. No adenopathy. ENDOCRINOLOGY:  No polyuria or polydipsia. SKIN:  No skin disorder. MUSCULOSKELETAL:  The patient has arthritis. PSYCH:  No suicidal ideation. The patient has some confusion on  admission. PHYSICAL EXAMINATION:  GENERAL:  The patient was lying flat, no acute distress. VITAL SIGNS:  Blood pressure 120/67, pulse 65, temperature 97.9. NECK:  No JVD. HEART:  Regular S1 and S2. No S3.  1/6 systolic murmur heard best at  the left sternal border. LUNGS:  Slightly diminished breath sounds in the bases. I could not  hear rales or wheezing. ABDOMEN:  Soft, nontender. EXTREMITIES:  The patient appears to have +1 to 2 edema in the lower  extremities with some chronic discoloration. NEUROLOGIC:  No focal deficits. The patient appears to have some memory  impairment. EKG on admission showing atrial fibrillation with paced ventricular  activity at a rate around 60 a minute. LABS:  Sodium 139, potassium 4.5, BUN 33, creatinine 1.6. WBC initially  is 27, hemoglobin 14.3, platelet count 705. Initial troponin is 145  with CK of 985. Repeat troponin is down to 122 with CK of 1362. INR  1.4. IMPRESSION:  1. Elevation of high-sensitivity troponin. This can be demand ischemia from infection with type 2 non-STEMI. The  patient had down trending of troponin which goes usually against acute  ischemic events. The patient also had some renal insufficiency on admission with  creatinine around 1.6 which can cause some troponin elevation. EKG is nondiagnostic due to the presence of paced ventricular activity. He has no complaints of chest pain and with his mental status and  advanced age, I do not see a need for coronary workup. 2.  Chronic atrial fibrillation with bradycardia. Status post pacemaker. He is in atrial fibrillation with paced ventricular activity on the EKG.   He is on Eliquis and I recommend keeping him on Eliquis for now,  especially with his history of mini stroke in the past.  3.  Elevated white count. Blood cultures were ordered. The patient was started on vancomycin.         Glenny Beck MD    D: 08/23/2022 8:32:46       T: 08/23/2022 8:37:43     [email or web address]  Job#: [id number]     Doc#: [id number]    CC:

## 2022-08-24 LAB
ANION GAP SERPL CALCULATED.3IONS-SCNC: 8 MMOL/L (ref 7–16)
BASOPHILS ABSOLUTE: 0.05 E9/L (ref 0–0.2)
BASOPHILS RELATIVE PERCENT: 0.3 % (ref 0–2)
BUN BLDV-MCNC: 23 MG/DL (ref 6–23)
CALCIUM SERPL-MCNC: 9.5 MG/DL (ref 8.6–10.2)
CHLORIDE BLD-SCNC: 104 MMOL/L (ref 98–107)
CO2: 23 MMOL/L (ref 22–29)
CREAT SERPL-MCNC: 1.1 MG/DL (ref 0.7–1.2)
EKG ATRIAL RATE: 64 BPM
EKG Q-T INTERVAL: 436 MS
EKG QRS DURATION: 148 MS
EKG QTC CALCULATION (BAZETT): 446 MS
EKG R AXIS: 95 DEGREES
EKG T AXIS: 15 DEGREES
EKG VENTRICULAR RATE: 63 BPM
EOSINOPHILS ABSOLUTE: 0.12 E9/L (ref 0.05–0.5)
EOSINOPHILS RELATIVE PERCENT: 0.8 % (ref 0–6)
GFR AFRICAN AMERICAN: >60
GFR NON-AFRICAN AMERICAN: >60 ML/MIN/1.73
GLUCOSE BLD-MCNC: 77 MG/DL (ref 74–99)
HCT VFR BLD CALC: 40.8 % (ref 37–54)
HEMOGLOBIN: 13.1 G/DL (ref 12.5–16.5)
IMMATURE GRANULOCYTES #: 0.15 E9/L
IMMATURE GRANULOCYTES %: 1 % (ref 0–5)
LYMPHOCYTES ABSOLUTE: 0.62 E9/L (ref 1.5–4)
LYMPHOCYTES RELATIVE PERCENT: 4 % (ref 20–42)
MCH RBC QN AUTO: 32.3 PG (ref 26–35)
MCHC RBC AUTO-ENTMCNC: 32.1 % (ref 32–34.5)
MCV RBC AUTO: 100.5 FL (ref 80–99.9)
METER GLUCOSE: 70 MG/DL (ref 74–99)
METER GLUCOSE: 94 MG/DL (ref 74–99)
METER GLUCOSE: 98 MG/DL (ref 74–99)
MONOCYTES ABSOLUTE: 0.95 E9/L (ref 0.1–0.95)
MONOCYTES RELATIVE PERCENT: 6.2 % (ref 2–12)
NEUTROPHILS ABSOLUTE: 13.51 E9/L (ref 1.8–7.3)
NEUTROPHILS RELATIVE PERCENT: 87.7 % (ref 43–80)
PDW BLD-RTO: 13.9 FL (ref 11.5–15)
PLATELET # BLD: 202 E9/L (ref 130–450)
PMV BLD AUTO: 11 FL (ref 7–12)
POTASSIUM SERPL-SCNC: 4.6 MMOL/L (ref 3.5–5)
PROCALCITONIN: 2.61 NG/ML (ref 0–0.08)
RBC # BLD: 4.06 E12/L (ref 3.8–5.8)
SODIUM BLD-SCNC: 135 MMOL/L (ref 132–146)
TOTAL CK: 249 U/L (ref 20–200)
WBC # BLD: 15.4 E9/L (ref 4.5–11.5)

## 2022-08-24 PROCEDURE — 97116 GAIT TRAINING THERAPY: CPT

## 2022-08-24 PROCEDURE — 6370000000 HC RX 637 (ALT 250 FOR IP): Performed by: INTERNAL MEDICINE

## 2022-08-24 PROCEDURE — 2700000000 HC OXYGEN THERAPY PER DAY

## 2022-08-24 PROCEDURE — 2060000000 HC ICU INTERMEDIATE R&B

## 2022-08-24 PROCEDURE — 82962 GLUCOSE BLOOD TEST: CPT

## 2022-08-24 PROCEDURE — 80048 BASIC METABOLIC PNL TOTAL CA: CPT

## 2022-08-24 PROCEDURE — 97110 THERAPEUTIC EXERCISES: CPT

## 2022-08-24 PROCEDURE — 85025 COMPLETE CBC W/AUTO DIFF WBC: CPT

## 2022-08-24 PROCEDURE — 99233 SBSQ HOSP IP/OBS HIGH 50: CPT | Performed by: INTERNAL MEDICINE

## 2022-08-24 PROCEDURE — 84145 PROCALCITONIN (PCT): CPT

## 2022-08-24 PROCEDURE — 82550 ASSAY OF CK (CPK): CPT

## 2022-08-24 PROCEDURE — 36415 COLL VENOUS BLD VENIPUNCTURE: CPT

## 2022-08-24 RX ORDER — FUROSEMIDE 10 MG/ML
40 INJECTION INTRAMUSCULAR; INTRAVENOUS ONCE
Status: DISCONTINUED | OUTPATIENT
Start: 2022-08-24 | End: 2022-08-24

## 2022-08-24 RX ORDER — FUROSEMIDE 20 MG/1
20 TABLET ORAL DAILY
Status: DISCONTINUED | OUTPATIENT
Start: 2022-08-25 | End: 2022-08-27 | Stop reason: HOSPADM

## 2022-08-24 RX ORDER — RAMIPRIL 2.5 MG/1
2.5 CAPSULE ORAL DAILY
Status: DISCONTINUED | OUTPATIENT
Start: 2022-08-24 | End: 2022-08-27 | Stop reason: HOSPADM

## 2022-08-24 RX ORDER — LANOLIN ALCOHOL/MO/W.PET/CERES
CREAM (GRAM) TOPICAL DAILY
Status: DISCONTINUED | OUTPATIENT
Start: 2022-08-24 | End: 2022-08-27 | Stop reason: HOSPADM

## 2022-08-24 RX ORDER — FUROSEMIDE 20 MG/1
20 TABLET ORAL DAILY
Status: DISCONTINUED | OUTPATIENT
Start: 2022-08-24 | End: 2022-08-24

## 2022-08-24 RX ORDER — AMOXICILLIN AND CLAVULANATE POTASSIUM 875; 125 MG/1; MG/1
1 TABLET, FILM COATED ORAL EVERY 12 HOURS SCHEDULED
Status: DISCONTINUED | OUTPATIENT
Start: 2022-08-24 | End: 2022-08-27 | Stop reason: HOSPADM

## 2022-08-24 RX ORDER — DOXYCYCLINE HYCLATE 100 MG/1
100 CAPSULE ORAL EVERY 12 HOURS SCHEDULED
Status: DISCONTINUED | OUTPATIENT
Start: 2022-08-24 | End: 2022-08-27 | Stop reason: HOSPADM

## 2022-08-24 RX ORDER — FUROSEMIDE 40 MG/1
40 TABLET ORAL DAILY
Status: COMPLETED | OUTPATIENT
Start: 2022-08-24 | End: 2022-08-24

## 2022-08-24 RX ORDER — DEXTROSE MONOHYDRATE 100 MG/ML
INJECTION, SOLUTION INTRAVENOUS CONTINUOUS PRN
Status: DISCONTINUED | OUTPATIENT
Start: 2022-08-24 | End: 2022-08-25

## 2022-08-24 RX ADMIN — APIXABAN 5 MG: 5 TABLET, FILM COATED ORAL at 08:40

## 2022-08-24 RX ADMIN — TIMOLOL MALEATE 1 DROP: 5 SOLUTION OPHTHALMIC at 21:26

## 2022-08-24 RX ADMIN — BRIMONIDINE TARTRATE 1 DROP: 2 SOLUTION OPHTHALMIC at 08:40

## 2022-08-24 RX ADMIN — APIXABAN 5 MG: 5 TABLET, FILM COATED ORAL at 21:21

## 2022-08-24 RX ADMIN — OXYCODONE AND ACETAMINOPHEN 1 TABLET: 5; 325 TABLET ORAL at 08:40

## 2022-08-24 RX ADMIN — Medication: at 21:00

## 2022-08-24 RX ADMIN — PRAVASTATIN SODIUM 40 MG: 20 TABLET ORAL at 21:21

## 2022-08-24 RX ADMIN — TIMOLOL MALEATE 1 DROP: 5 SOLUTION OPHTHALMIC at 08:40

## 2022-08-24 RX ADMIN — BRIMONIDINE TARTRATE 1 DROP: 2 SOLUTION OPHTHALMIC at 21:26

## 2022-08-24 RX ADMIN — OXYCODONE AND ACETAMINOPHEN 1 TABLET: 5; 325 TABLET ORAL at 21:21

## 2022-08-24 RX ADMIN — RAMIPRIL 2.5 MG: 2.5 CAPSULE ORAL at 11:25

## 2022-08-24 RX ADMIN — PANTOPRAZOLE SODIUM 40 MG: 40 TABLET, DELAYED RELEASE ORAL at 06:38

## 2022-08-24 RX ADMIN — AMOXICILLIN AND CLAVULANATE POTASSIUM 1 TABLET: 875; 125 TABLET, FILM COATED ORAL at 21:00

## 2022-08-24 RX ADMIN — FUROSEMIDE 40 MG: 40 TABLET ORAL at 11:25

## 2022-08-24 RX ADMIN — DOXYCYCLINE HYCLATE 100 MG: 100 CAPSULE ORAL at 21:21

## 2022-08-24 ASSESSMENT — PAIN DESCRIPTION - LOCATION: LOCATION: LEG

## 2022-08-24 ASSESSMENT — PAIN DESCRIPTION - DESCRIPTORS: DESCRIPTORS: ACHING

## 2022-08-24 ASSESSMENT — PAIN DESCRIPTION - ORIENTATION: ORIENTATION: RIGHT

## 2022-08-24 ASSESSMENT — PAIN SCALES - GENERAL: PAINLEVEL_OUTOF10: 7

## 2022-08-24 NOTE — PROGRESS NOTES
Patient pleasant and cooperative this am, complains of right shoulder pain that is controlled with prn pain medication. Patient refused to allow this nurse or night nurse replace iv, Dr. Oscar Mir made aware. Assessment complete. Will continue to monitor. 1025 Wound nurse alerted of consult for rle cellulitis.

## 2022-08-24 NOTE — PROGRESS NOTES
PROGRESS NOTE  By Kole Rodriguez M.D. [facility]  Dr. Maribell Gutierrez M.D.,  Dr. Jaimie Pool M.D.,   Dr. Stanton Liz, D.O.,  Dr. Ulisses Reeves M.D.,  Dr. Scooter Dominguez D.O.,  Meryle Pilsner, D.OAmanuel [place] Drop  80 y.o.  male    SUBJECTIVE:  Denies abdominal pain. Denies nausea vomiting. No family at bedside    OBJECTIVE:    BP (!) 155/77   Pulse 59   Temp 97.8 °F (36.6 °C) (Oral)   Resp 22   Ht 5' 9\" (1.753 m)   Wt 180 lb (81.6 kg)   SpO2 97%   BMI 26.58 kg/m²     General: Elderly  male. NAD  HEENT: Anicteric sclera/moist oral mucosa  Neck: Supple/trachea is midline  Chest: Symmetric excursion/labored respirations  Cor: Regular  Abd.: Soft/NT/ND  Extr.:  No peripheral edema  Skin: Warm and dry/anicteric      DATA:    Monitor data reviewed -leads off.        Lab Results   Component Value Date/Time    WBC 15.4 08/24/2022 06:43 AM    RBC 4.06 08/24/2022 06:43 AM    HGB 13.1 08/24/2022 06:43 AM    HCT 40.8 08/24/2022 06:43 AM    .5 08/24/2022 06:43 AM    MCH 32.3 08/24/2022 06:43 AM    MCHC 32.1 08/24/2022 06:43 AM    RDW 13.9 08/24/2022 06:43 AM     08/24/2022 06:43 AM    MPV 11.0 08/24/2022 06:43 AM     Lab Results   Component Value Date/Time     08/24/2022 06:43 AM    K 4.6 08/24/2022 06:43 AM    K 3.9 03/17/2022 04:25 PM     08/24/2022 06:43 AM    CO2 23 08/24/2022 06:43 AM    BUN 23 08/24/2022 06:43 AM    CREATININE 1.1 08/24/2022 06:43 AM    CREATININE 1.2 03/23/2020 12:00 AM    CREATININE 1.2 03/23/2020 12:00 AM    CALCIUM 9.5 08/24/2022 06:43 AM    PROT 6.7 08/23/2022 09:00 AM    LABALBU 3.1 08/23/2022 09:00 AM    LABALBU 4.1 03/05/2012 08:54 AM    BILITOT 0.3 08/23/2022 09:00 AM    ALKPHOS 66 08/23/2022 09:00 AM    AST 43 08/23/2022 09:00 AM    ALT 11 08/23/2022 09:00 AM     Lab Results   Component Value Date/Time    LIPASE 27 04/07/2018 10:40 PM     No results found for: AMYLASE      ASSESSMENT/PLAN:  Patient Active Problem List Diagnosis    Essential hypertension    Inflammatory arthritis    Paroxysmal A-fib (HCC)    Mixed hyperlipidemia    Pacemaker    Type 2 diabetes mellitus with stage 3b chronic kidney disease, without long-term current use of insulin (HCC)    Ichthyosis    Hip fracture requiring operative repair, left, closed, initial encounter ([facility]    Stage 3 chronic kidney disease ([facility]    Bilateral leg edema    Blister of right lower leg without infection    Blister of left leg without infection    Ear lesion    Vaccination declined by patient    GIB (gastrointestinal bleeding)     1. GI bleed  -Coffee-ground emesis reported on presentation   -EGD 8/23 revealing normal upper endoscopy without source of bleeding. 2.  CRC screening  -According to patient's daughter patient never had colonoscopy  -At this time they do not seem interested in  colonoscopy -recommend follow-up as outpatient for further discussion     3. Elevated troponin  -Per cardiology     No immediate plans for intervention from GI POV. Follow-up in the office in 2 to 3 weeks after discharge -patient/family are to call for appointment and with questions/concerns at [phone]. Thank you for the opportunity to participate in the care of . Gautam Tello MD  8/24/2022  9:30 AM    NOTE:  This report was transcribed using voice recognition software. Every effort was made to ensure accuracy; however, inadvertent computerized transcription errors may be present.
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
## 2022-08-24 NOTE — CONSULTS
Comprehensive Nutrition Assessment    Type and Reason for Visit:  Initial, Wound, Consult    Nutrition Recommendations/Plan: Gelatein 20 TID     Malnutrition Assessment:  Malnutrition Status: At risk for malnutrition (Comment) (08/24/22 1347)    Context:  Acute Illness     Findings of the 6 clinical characteristics of malnutrition:  Energy Intake:  50% or less of estimated energy requirements for 5 or more days  Weight Loss:  Unable to assess (wt loss x2mos not sign. per policy Q1,Z4,D0)     Body Fat Loss:  No significant body fat loss     Muscle Mass Loss:  No significant muscle mass loss    Fluid Accumulation:  No significant fluid accumulation     Strength:  Not Performed    Nutrition Assessment:    Pt admits r/o GI Bleed w/ H/o CKD 3, DM, CVA, HTN. Pt tolerating diet w/ 26-50% po intakes, increased nutrient needs 2/2 presence of wounds. Will start ONS TID to optimize nutrition    Nutrition Related Findings:    A/ox4, abd WDL, +BS, [place], RLE +2 pitting edema, LLE +1 pitting edema Wound Type: None, Skin Tears, Venous Stasis       Current Nutrition Intake & Therapies:    Average Meal Intake: 26-50%     ADULT DIET; Regular; 3 carb choices (45 gm/meal); Low Fat/Low Chol/High Fiber/2 gm Na  ADULT ORAL NUTRITION SUPPLEMENT; Breakfast, Lunch, Dinner; Other Oral Supplement; Gelatein 20    Anthropometric Measures:  Height: 5' 9\" (175.3 cm)  Ideal Body Weight (IBW): 160 lbs (73 kg)       Current Body Weight: 180 lb (81.6 kg) (8/24 bed), 112.5 % IBW. Current BMI (kg/m2): 26.6  Usual Body Weight: 187 lb (84.8 kg) (per emr x2 mos ago: bed scale wt)  % Weight Change (Calculated): -3.7                    BMI Categories: Overweight (BMI 25.0-29. 9)    Estimated Daily Nutrient Needs:  Energy Requirements Based On: Formula  Weight Used for Energy Requirements: Current  Energy (kcal/day): 6136-8284  Weight Used for Protein Requirements: Ideal  Protein (g/day):  (x1.3-1.5gm/kg)  Method Used for Fluid Requirements: 1 ml/kcal  Fluid (ml/day): 2107-1093    Nutrition Diagnosis:   Inadequate oral intake related to altered GI function as evidenced by intake 26-50%, wounds, weight loss, GI abnormality (wt loss x 2mo -3.8%)    Nutrition Interventions:   Food and/or Nutrient Delivery: Continue Current Diet, Start Oral Nutrition Supplement  Nutrition Education/Counseling: No recommendation at this time  Coordination of Nutrition Care: Continue to monitor while inpatient       Goals:     Goals: other (specify)  Specify Other Goals: Consumr >50% meals/ONS    Nutrition Monitoring and Evaluation:   Behavioral-Environmental Outcomes: None Identified  Food/Nutrient Intake Outcomes: Food and Nutrient Intake, Supplement Intake  Physical Signs/Symptoms Outcomes: Biochemical Data, Chewing or Swallowing, GI Status, Fluid Status or Edema, Nutrition Focused Physical Findings, Skin, Weight    Discharge Planning:     Too soon to determine     Delcambre MARCO Cotton, LD  Contact: [phone]

## 2022-08-24 NOTE — PROGRESS NOTES
Patient refusing 2am blood sugar check per protocol for an HS sugar of 70. Patient gets very agitated when doing vitals and finger sticks.

## 2022-08-24 NOTE — FLOWSHEET NOTE
Inpatient Wound Care (Initial consult) [id number]    Admit Date: 8/21/2022  2:04 PM    Reason for consult:  Bilateral lower legs    Significant history: Per H&P    CHIEF COMPLAINT: Confusion     History of Present Illness:  80 y.o. male with a history of atrial fibrillation systemic anticoagulation BPH CAD cerebrovascular disease diabetes hard of hearing hyperlipidemia hypertension osteoarthritis peripheral vascular disease chronic kidney disease stage IIIb presents with confusion. He was found on the ground at home by his neighbor who speculates he must of been there for at least a couple of hours. He lives alone he has a history of cognitive impairment manifesting over the last couple years according to the daughter helps with a history the daughter Adamaris Linares is at the bedside. She agrees that he is not been himself during the ER visit more confused than usual.  He usually functions well at home. Patient has a family who is next of kin consists of a son who was in a nursing home with cognitive issues a daughter Josse Maza who is a PA and the daughter Adamaris Linares as mentioned who was at the bedside. Adamaris Linares says that there is no official POA but that her and her sister agree that he has mentioned his desires to be a DNR.   The emergency room doctor who also helps with the history also spoke to one of the daughters to confirm this  @2615 ED notes that he had coffee-ground emesis    Findings:     08/24/22 1209   Skin Integumentary    Skin Integrity Ecchymosis   Location BUE   Skin Integrity Site 2   Skin Integrity Location 2 Abrasion   Location 2 chin   Skin Integrity Site 3   Skin Integrity Location 3   (dry, flaky, dry scabs)    Location 3 BLE   Skin Integrity Site 4   Skin Integrity Location 4 Redness  (swelling)   Location 4 RLE   Skin Integrity Site 5   Skin Integrity Location 5   (redness, blanchable)   Location 5 bilateral buttocks   Wound 08/21/22 Wrist Left;Dorsal   Date First Assessed/Time First Assessed: 08/21/22 8016 Present on Hospital Admission: Yes  Primary Wound Type: Skin Tear  Location: Wrist  Wound Location Orientation: Left;Dorsal   Wound Etiology Skin Tear   Dressing/Treatment Open to air   Wound Length (cm) 1.5 cm   Wound Width (cm) 3 cm   Wound Depth (cm) 0.1 cm   Wound Surface Area (cm^2) 4.5 cm^2   Wound Volume (cm^3) 0.45 cm^3   Wound Assessment Dry;Pink/red   Drainage Amount None   Odor None   Hansa-wound Assessment Dry/flaky   Wound 08/24/22 Leg Right; Lower   Date First Assessed/Time First Assessed: 08/24/22 1209   Location: Leg  Wound Location Orientation: Right; Lower   Wound Image    Wound Etiology Venous   Dressing Status New dressing applied   Wound Cleansed Cleansed with saline   Dressing/Treatment ABD;Dry dressing;Roll gauze; Xeroform   Wound Length (cm) 10 cm   Wound Width (cm) 5 cm   Wound Depth (cm) 0.1 cm   Wound Surface Area (cm^2) 50 cm^2   Wound Volume (cm^3) 5 cm^3   Wound Assessment Pink/red   Drainage Amount Small   Drainage Description Clear   Odor None   Hansa-wound Assessment Maceration     **Informed Consent**    The patient has given verbal consent to have photos taken of wound and inserted into their chart as part of their permanent medical record for purposes of documentation, treatment management and/or medical review. All Images taken on 8/24/22 of patient name: Eric Hendricks were transmitted and stored on secured Granular located within [facility] by a registered [employer] Mobile Application Device. Impression:  Right lower leg; venous      Plan: Xeroform, 4x4, abd pad, kerlix  Aquaphor  TAPS  Heel protectors  Home care for dressing change  Patient will need continued preventative care.         Claudene Clayman, RN 8/24/2022 12:56 PM

## 2022-08-24 NOTE — PROGRESS NOTES
Physical Therapy  Physical Therapy Treatment Note/Plan of Care    Room #:  4356/1374-06  Patient Name: Samara Wolf  YOB: 1934  MRN: [id number]    Date of Service: 8/24/2022     Tentative placement recommendation: Subacute vs Home Health Physical Therapy if patient meets goals with 24/7 Supervision  Equipment recommendation: Patient has needed equipment       Evaluating Physical Therapist: Manuel Johnson, PT, DPT #[id number]      Specific Provider Orders/Date/Referring Provider :     08/22/22 0815    PT eval and treat  Start:  08/22/22 0815,   End:  08/22/22 0815,   ONE TIME,   Standing Count:  1 Occurrences,   Luana Newsome MD Acknowledge New       Admitting Diagnosis:     GIB (gastrointestinal bleeding) [K92.2]  Elevated troponin [R77.8]  CHRISTOPHER (acute kidney injury) ([facility] [N17.9]  Cellulitis of right leg [G42.097]  Non-traumatic rhabdomyolysis [M62.82]  Gastrointestinal hemorrhage with hematemesis [K92.0]      Surgery:   Visit Diagnoses         Codes    Non-traumatic rhabdomyolysis     M62.82    Cellulitis of right leg     L03.115    Elevated troponin     R77.8    CHRISTOPHER (acute kidney injury) ([facility]     N17.9            Patient Active Problem List   Diagnosis    Essential hypertension    Inflammatory arthritis    Paroxysmal A-fib ([facility]    Mixed hyperlipidemia    Pacemaker    Type 2 diabetes mellitus with stage 3b chronic kidney disease, without long-term current use of insulin ([address]    Ichthyosis    Hip fracture requiring operative repair, left, closed, initial encounter ([facility]    Stage 3 chronic kidney disease ([facility]    Bilateral leg edema    Blister of right lower leg without infection    Blister of left leg without infection    Ear lesion    Vaccination declined by patient    GIB (gastrointestinal bleeding)      ASSESSMENT of Current Deficits Patient exhibits decreased strength, balance, and endurance impairing functional mobility, transfers, gait , gait distance, and tolerance to activity. Pt mildly unsteady with gait with VC needed for safety and WW approximation. He was able to progress ambulation distance and was motivated to participate. However, poor insight into deficits and poor safety awareness where he is at risk of falls. PHYSICAL THERAPY  PLAN OF CARE   Physical therapy plan of care is established based on physician order, patient diagnosis and clinical assessment    Current Treatment Recommendations:    -Bed Mobility: Lower extremity exercises  and Trunk control activities   -Sitting Balance: Incorporate reaching activities to activate trunk muscles , Hands on support to maintain midline , Facilitate active trunk muscle engagement , Facilitate postural control in all planes , and Engage in core activities to allow for movement within base of support   -Standing Balance: Perform strengthening exercises in standing to promote motor control with or without upper extremity support , Instruct patient on adequate base of support to maintain balance, and Challenge balance utilizing reaching  activities beyond center of gravity    -Transfers: Provide instruction on proper hand and foot position for adequate transfer of weight onto lower extremities and use of gait device if needed, Cues for hand placement, technique and safety.  Provide stabilization to prevent fall , Facilitate weight shift forward on to lower extremities and provide necessary stabilization of bilateral lower extremities , Support transfer of weight on to lower extremities, and Assist with extension of knees trunk and hip to accept weight transfer   -Gait: Gait training, Standing activities to improve: base of support, weight shift, weight bearing , Exercises to improve trunk control, Exercises to improve hip and knee control, Performance of protected weight bearing activities, and Activities to increase weight bearing   -Endurance: Utilize Supervised activities to increase level of endurance to allow for safe functional mobility including transfers and gait  and Use graduated activities to promote good breathing techniques and provide support and education to maximize respiratory function  -Stairs: Stair training with instruction on proper technique and hand placement on rail    PT long term treatment goals are located in below grid    Patient and or family understand(s) diagnosis, prognosis, and plan of care. Frequency of treatments: Patient will be seen  daily. Prior Level of Function: Patient ambulated with wheeled walker   Rehab Potential: good - for baseline    Past medical history:   Past Medical History:   Diagnosis Date    A-fib ([address]     Anticoagulant long-term use     Atrial fibrillation (HCC)     BPH (benign prostatic hyperplasia)     CAD (coronary artery disease)     Cataract of left eye 3/11/2014    Cerebrovascular disease     CHF (congestive heart failure) (HCC)     Diabetes mellitus ([address]     Dislocated IOL (intraocular lens), posterior 11/15/2013    Hearing loss     Hyperlipidemia     Hypertension     Osteoarthritis     Peripheral vascular disease ([address]     Renal cyst     Right cataract 11/12/2013    Type 2 diabetes mellitus with stage 3b chronic kidney disease, without long-term current use of insulin ([address] 2/22/2022    Type 2 diabetes mellitus without complication ([facility])     Unspecified cerebral artery occlusion with cerebral infarction 10/2010    mild stroke     Past Surgical History:   Procedure Laterality Date    CATARACT REMOVAL WITH IMPLANT Right 11 12 2013    CATARACT REMOVAL WITH IMPLANT Left 03/11/13    EYE SURGERY Right 11/15/13    dislocated intraocular lens    JOINT REPLACEMENT      left hip    UPPER GASTROINTESTINAL ENDOSCOPY N/A 8/23/2022    EGD ESOPHAGOGASTRODUODENOSCOPY performed by Luciana Rucker MD at [address] [place] [address]:  Precautions:  Up with assistance, falls, confusion, and hard of hearing     Social history: Patient lives alone in a ranch home  with 3 steps  to enter bilateral Rail  Tub shower grab bars    Equipment owned: John Vasquez, [address], Standard Walker, Shower chair, and Tub transfer bench,      Hema Alonso Mobility Inpatient   How much difficulty turning over in bed?: A Little  How much difficulty sitting down on / standing up from a chair with arms?: A Little  How much difficulty moving from lying on back to sitting on side of bed?: A Little  How much help from another person moving to and from a bed to a chair?: A Little  How much help from another person needed to walk in hospital room?: A Little  How much help from another person for climbing 3-5 steps with a railing?: A Lot  AM-PAC Inpatient Mobility Raw Score : 17  AM-PAC Inpatient T-Scale Score : 42.13  Mobility Inpatient CMS 0-100% Score: 50.57  Mobility Inpatient CMS G-Code Modifier : CK    Nursing cleared patient for PT treatment. Patient complaint of pain in right leg. OBJECTIVE;   Initial Evaluation  Date: 8/22/2022 Treatment Date:  8/24/2022   Short Term/ Long Term   Goals   Was pt agreeable to Eval/treatment? Yes Yes To be met in 4 days   Pain level   0/10   8/10    Bed Mobility  Rolling: Not assessed patient in chair    Supine to sit: Not assessed patient in chair    Sit to supine: Not assessed patient in chair    Scooting: Supervision    Rolling: Not assessed    Supine to sit: Supervision    Sit to supine: Not assessed    Scooting: Supervision   Rolling: Independent    Supine to sit: Independent    Sit to supine: Independent    Scooting: Independent     Transfers Sit to stand:  Min-ModA   Sit to stand: Minimal assist of 1 Sit to stand: Independent    Ambulation     2 x 60 feet using  wheeled walker with Minimal assist of 1   for walker control, walker approximation, balance, and safety Patient ambulated 25 feet x 4 using wheeled walker with Minimal assist of 1.  Verbal cues were given for safety, upright posture, increased base of support, walker management, and obstacle negotiation. > 125 feet using  wheeled walker with Modified Independent    Stair negotiation: ascended and descended   Not assessed  Not assessed   3 steps with 2 HR with Mod I   ROM Within functional limits    Increase range of motion 10% of affected joints    Strength BUE:  refer to OT eval  RLE:  4-/5  LLE:  4-/5  Increase strength in affected mm groups by 1/3 grade   Balance Sitting EOB:  fair +  Dynamic Standing:  fair  Sitting EOB: fair+  Dynamic Standing: fair  Sitting EOB:  good   Dynamic Standing: fair +     Patient is Alert & Oriented x person, place, time, and situation and follows directions    Sensation: Patient  denies numbness/tingling   Edema:  no   Endurance: fair      Vitals: room air   Blood Pressure at rest  Blood Pressure during session    Heart Rate at rest  Heart Rate during session    SPO2 at rest %  SPO2 during session %     Patient education  Patient educated on role of Physical Therapy, risks of immobility, safety and plan of care, energy conservation,  importance of mobility while in hospital , ankle pumps, quad set and glut set for edema control, blood clot prevention, and safety      Patient response to education:   Pt verbalized understanding Pt demonstrated skill Pt requires further education in this area   Yes Partial Yes      Treatment: Patient practiced and was instructed/facilitated in the following treatment: Patient Sat edge of bed 5 minutes with Supervision  to increase dynamic sitting balance and activity tolerance. Pt performed bed mobility, transfers, exercises and ambulation in hallway. Therapeutic Exercises: ankle pumps, hip abduction/adduction, long arc quad, and seated marching x 10 reps. Verbal cues were given for correct technique and to perform 2-3x daily. At end of session, patient in chair with  right lower extremity elevated on foot stool, call light and phone within reach, all lines and tubes intact, nursing notified.       Patient would benefit from continued skilled Physical Therapy to improve functional independence and quality of life.        Patient's/ family goals   get stronger    Time in 0953  Time out 1018    Total Treatment Time 25 minutes    CPT codes:  Therapeutic exercises (73895)   10 minutes  1 unit(s)  Gait Training (36389) 15 minutes 1 unit(s)    Leonel Calles PTA   LIC# [id number]

## 2022-08-24 NOTE — PROGRESS NOTES
Department of Internal Medicine  General Internal Medicine  Attending Progress Note  Chief Complaint   Patient presents with    Fall     SUBJECTIVE:    Reports that he is doing well. Denied fever and chills. No chest pain. Aware of plans to resume his home BP medications. Patient expressed interest in being discharged home. Declining placement to SNF for PT. Patients' daughters aware of treatment plancs.      OBJECTIVE      Medications    Current Facility-Administered Medications: ramipril (ALTACE) capsule 2.5 mg, 2.5 mg, Oral, Daily  [START ON 8/25/2022] furosemide (LASIX) tablet 20 mg, 20 mg, Oral, Daily  glucose chewable tablet 16 g, 4 tablet, Oral, PRN  dextrose bolus 10% 125 mL, 125 mL, IntraVENous, PRN **OR** dextrose bolus 10% 250 mL, 250 mL, IntraVENous, PRN  glucagon (rDNA) injection 1 mg, 1 mg, SubCUTAneous, PRN  dextrose 10 % infusion, , IntraVENous, Continuous PRN  Minerin Creme CREA, , Topical, Daily  amoxicillin-clavulanate (AUGMENTIN) 875-125 MG per tablet 1 tablet, 1 tablet, Oral, 2 times per day  doxycycline hyclate (VIBRAMYCIN) capsule 100 mg, 100 mg, Oral, 2 times per day  apixaban (ELIQUIS) tablet 5 mg, 5 mg, Oral, BID  pantoprazole (PROTONIX) tablet 40 mg, 40 mg, Oral, BID AC  oxyCODONE-acetaminophen (PERCOCET) 5-325 MG per tablet 1 tablet, 1 tablet, Oral, Q4H PRN  sodium chloride flush 0.9 % injection 5-40 mL, 5-40 mL, IntraVENous, 2 times per day  sodium chloride flush 0.9 % injection 5-40 mL, 5-40 mL, IntraVENous, PRN  0.9 % sodium chloride infusion, , IntraVENous, PRN  ondansetron (ZOFRAN-ODT) disintegrating tablet 4 mg, 4 mg, Oral, Q8H PRN **OR** ondansetron (ZOFRAN) injection 4 mg, 4 mg, IntraVENous, Q6H PRN  polyethylene glycol (GLYCOLAX) packet 17 g, 17 g, Oral, Daily PRN  acetaminophen (TYLENOL) tablet 650 mg, 650 mg, Oral, Q6H PRN **OR** acetaminophen (TYLENOL) suppository 650 mg, 650 mg, Rectal, Q6H PRN  perflutren lipid microspheres (DEFINITY) injection 1.65 mg, 1.5 mL, IntraVENous, ONCE PRN  pravastatin (PRAVACHOL) tablet 40 mg, 40 mg, Oral, Nightly  insulin lispro (HUMALOG) injection vial 0-4 Units, 0-4 Units, SubCUTAneous, TID WC  insulin lispro (HUMALOG) injection vial 0-4 Units, 0-4 Units, SubCUTAneous, Nightly  brimonidine (ALPHAGAN) 0.2 % ophthalmic solution 1 drop, 1 drop, Both Eyes, Q12H **AND** timolol (TIMOPTIC) 0.5 % ophthalmic solution 1 drop, 1 drop, Both Eyes, Q12H  [Held by provider] cefTRIAXone (ROCEPHIN) 1,000 mg in sterile water 10 mL IV syringe, 1,000 mg, IntraVENous, Daily  Physical    VITALS:  BP (!) 159/83   Pulse 61   Temp 97.8 °F (36.6 °C) (Oral)   Resp 20   Ht 5' 9\" (1.753 m)   Wt 180 lb (81.6 kg)   SpO2 98%   BMI 26.58 kg/m²   CONSTITUTIONAL:  awake, alert, and cooperative  EYES:  extra-ocular muscles intact and vision intact  ENT:  normocepalic, without obvious abnormality  NECK:  supple, symmetrical, trachea midline  BACK:  symmetric  LUNGS:  no increased work of breathing, no retractions, and no crackles or wheezing  CARDIOVASCULAR:  normal apical pulses and normal S1 and S2  ABDOMEN:  normal bowel sounds, non-distended, and non-tender  MUSCULOSKELETAL:  right leg edema/redness  NEUROLOGIC:  Mental Status Exam:  Level of Alertness:   awake  SKIN:  no bruising or bleeding  Data    CBC:   Lab Results   Component Value Date/Time    WBC 15.4 08/24/2022 06:43 AM    RBC 4.06 08/24/2022 06:43 AM    HGB 13.1 08/24/2022 06:43 AM    HCT 40.8 08/24/2022 06:43 AM    .5 08/24/2022 06:43 AM    MCH 32.3 08/24/2022 06:43 AM    MCHC 32.1 08/24/2022 06:43 AM    RDW 13.9 08/24/2022 06:43 AM     08/24/2022 06:43 AM    MPV 11.0 08/24/2022 06:43 AM     BMP:    Lab Results   Component Value Date/Time     08/24/2022 06:43 AM    K 4.6 08/24/2022 06:43 AM    K 3.9 03/17/2022 04:25 PM     08/24/2022 06:43 AM    CO2 23 08/24/2022 06:43 AM    BUN 23 08/24/2022 06:43 AM    LABALBU 3.1 08/23/2022 09:00 AM    LABALBU 4.1 03/05/2012 08:54 AM    CREATININE 1.1 08/24/2022 06:43 AM    CREATININE 1.2 03/23/2020 12:00 AM    CREATININE 1.2 03/23/2020 12:00 AM    CALCIUM 9.5 08/24/2022 06:43 AM    GFRAA >60 08/24/2022 06:43 AM    LABGLOM >60 08/24/2022 06:43 AM    GLUCOSE 77 08/24/2022 06:43 AM    GLUCOSE 137 03/05/2012 08:54 AM       ASSESSMENT AND PLAN     Hematemesis: S/P EGD and colonoscopy. No overt bleeding signs. Hgb has remained stable despite resuming anticoagulation. Continue to monitor  Right lower leg cellulitis: much improved. However swelling is still persistent. Hold Ceftriaxone as patient has no IV access and is refusing for another to be inserted. Started on oral Doxy and Augmentin. Procal seems to be improving so is wbc repeat in am  Atrial fibrillation: rate control. Resume anticoagulations  Hx of CVA: resume statin since rhabdo has improved  CHRISTOPHER: much improved. Creat is WNL  PAD: discussed with daughter who declined advance work up. Contiue to monitor  Right leg swelling and wound: defer to wound care  Type 2 DM: HA1C at admission is 6.7. noted few episodes of hypoglycemia. Will continue to hold glipizide and discontinue this at discharge. Patient is at high risk of hypoglycemia due to age and occasional CHRISTOPHER. Mild rhabdomyolysis: CK almost back to normal. Stopped IV fluid. Continue to monitor. Discharge planning in the next 24 hours.

## 2022-08-25 LAB
ANION GAP SERPL CALCULATED.3IONS-SCNC: 9 MMOL/L (ref 7–16)
BASOPHILS ABSOLUTE: 0.03 E9/L (ref 0–0.2)
BASOPHILS RELATIVE PERCENT: 0.3 % (ref 0–2)
BUN BLDV-MCNC: 22 MG/DL (ref 6–23)
CALCIUM SERPL-MCNC: 9.5 MG/DL (ref 8.6–10.2)
CHLORIDE BLD-SCNC: 100 MMOL/L (ref 98–107)
CO2: 27 MMOL/L (ref 22–29)
CREAT SERPL-MCNC: 1.2 MG/DL (ref 0.7–1.2)
EOSINOPHILS ABSOLUTE: 0.1 E9/L (ref 0.05–0.5)
EOSINOPHILS RELATIVE PERCENT: 1 % (ref 0–6)
GFR AFRICAN AMERICAN: >60
GFR NON-AFRICAN AMERICAN: 57 ML/MIN/1.73
GLUCOSE BLD-MCNC: 91 MG/DL (ref 74–99)
HCT VFR BLD CALC: 41.6 % (ref 37–54)
HEMOGLOBIN: 13.4 G/DL (ref 12.5–16.5)
IMMATURE GRANULOCYTES #: 0.1 E9/L
IMMATURE GRANULOCYTES %: 1 % (ref 0–5)
LYMPHOCYTES ABSOLUTE: 0.78 E9/L (ref 1.5–4)
LYMPHOCYTES RELATIVE PERCENT: 7.6 % (ref 20–42)
MCH RBC QN AUTO: 32 PG (ref 26–35)
MCHC RBC AUTO-ENTMCNC: 32.2 % (ref 32–34.5)
MCV RBC AUTO: 99.3 FL (ref 80–99.9)
METER GLUCOSE: 86 MG/DL (ref 74–99)
MONOCYTES ABSOLUTE: 0.91 E9/L (ref 0.1–0.95)
MONOCYTES RELATIVE PERCENT: 8.9 % (ref 2–12)
NEUTROPHILS ABSOLUTE: 8.36 E9/L (ref 1.8–7.3)
NEUTROPHILS RELATIVE PERCENT: 81.2 % (ref 43–80)
PDW BLD-RTO: 13.6 FL (ref 11.5–15)
PLATELET # BLD: 217 E9/L (ref 130–450)
PMV BLD AUTO: 10.4 FL (ref 7–12)
POTASSIUM SERPL-SCNC: 4 MMOL/L (ref 3.5–5)
PROCALCITONIN: 1.38 NG/ML (ref 0–0.08)
RBC # BLD: 4.19 E12/L (ref 3.8–5.8)
SODIUM BLD-SCNC: 136 MMOL/L (ref 132–146)
TOTAL CK: 106 U/L (ref 20–200)
WBC # BLD: 10.3 E9/L (ref 4.5–11.5)

## 2022-08-25 PROCEDURE — 97535 SELF CARE MNGMENT TRAINING: CPT

## 2022-08-25 PROCEDURE — 99232 SBSQ HOSP IP/OBS MODERATE 35: CPT | Performed by: INTERNAL MEDICINE

## 2022-08-25 PROCEDURE — 6370000000 HC RX 637 (ALT 250 FOR IP): Performed by: INTERNAL MEDICINE

## 2022-08-25 PROCEDURE — 97530 THERAPEUTIC ACTIVITIES: CPT

## 2022-08-25 PROCEDURE — 2060000000 HC ICU INTERMEDIATE R&B

## 2022-08-25 PROCEDURE — 80048 BASIC METABOLIC PNL TOTAL CA: CPT

## 2022-08-25 PROCEDURE — 84145 PROCALCITONIN (PCT): CPT

## 2022-08-25 PROCEDURE — 82962 GLUCOSE BLOOD TEST: CPT

## 2022-08-25 PROCEDURE — 82550 ASSAY OF CK (CPK): CPT

## 2022-08-25 PROCEDURE — 2580000003 HC RX 258: Performed by: INTERNAL MEDICINE

## 2022-08-25 PROCEDURE — 85025 COMPLETE CBC W/AUTO DIFF WBC: CPT

## 2022-08-25 PROCEDURE — 36415 COLL VENOUS BLD VENIPUNCTURE: CPT

## 2022-08-25 RX ADMIN — DOXYCYCLINE HYCLATE 100 MG: 100 CAPSULE ORAL at 09:20

## 2022-08-25 RX ADMIN — TIMOLOL MALEATE 1 DROP: 5 SOLUTION OPHTHALMIC at 21:00

## 2022-08-25 RX ADMIN — BRIMONIDINE TARTRATE 1 DROP: 2 SOLUTION OPHTHALMIC at 21:00

## 2022-08-25 RX ADMIN — BRIMONIDINE TARTRATE 1 DROP: 2 SOLUTION OPHTHALMIC at 09:47

## 2022-08-25 RX ADMIN — PANTOPRAZOLE SODIUM 40 MG: 40 TABLET, DELAYED RELEASE ORAL at 16:23

## 2022-08-25 RX ADMIN — Medication 10 ML: at 21:00

## 2022-08-25 RX ADMIN — TIMOLOL MALEATE 1 DROP: 5 SOLUTION OPHTHALMIC at 09:21

## 2022-08-25 RX ADMIN — ACETAMINOPHEN 650 MG: 325 TABLET ORAL at 21:00

## 2022-08-25 RX ADMIN — RAMIPRIL 2.5 MG: 2.5 CAPSULE ORAL at 09:47

## 2022-08-25 RX ADMIN — AMOXICILLIN AND CLAVULANATE POTASSIUM 1 TABLET: 875; 125 TABLET, FILM COATED ORAL at 20:59

## 2022-08-25 RX ADMIN — APIXABAN 5 MG: 5 TABLET, FILM COATED ORAL at 20:59

## 2022-08-25 RX ADMIN — PANTOPRAZOLE SODIUM 40 MG: 40 TABLET, DELAYED RELEASE ORAL at 06:43

## 2022-08-25 RX ADMIN — Medication: at 09:20

## 2022-08-25 RX ADMIN — APIXABAN 5 MG: 5 TABLET, FILM COATED ORAL at 09:20

## 2022-08-25 RX ADMIN — AMOXICILLIN AND CLAVULANATE POTASSIUM 1 TABLET: 875; 125 TABLET, FILM COATED ORAL at 09:47

## 2022-08-25 RX ADMIN — DOXYCYCLINE HYCLATE 100 MG: 100 CAPSULE ORAL at 20:59

## 2022-08-25 RX ADMIN — OXYCODONE AND ACETAMINOPHEN 1 TABLET: 5; 325 TABLET ORAL at 18:30

## 2022-08-25 RX ADMIN — FUROSEMIDE 20 MG: 20 TABLET ORAL at 09:20

## 2022-08-25 RX ADMIN — PRAVASTATIN SODIUM 40 MG: 20 TABLET ORAL at 20:59

## 2022-08-25 ASSESSMENT — PAIN DESCRIPTION - ORIENTATION
ORIENTATION: RIGHT
ORIENTATION: RIGHT;LEFT

## 2022-08-25 ASSESSMENT — PAIN SCALES - GENERAL
PAINLEVEL_OUTOF10: 7
PAINLEVEL_OUTOF10: 7
PAINLEVEL_OUTOF10: 3

## 2022-08-25 ASSESSMENT — PAIN DESCRIPTION - DESCRIPTORS
DESCRIPTORS: BURNING
DESCRIPTORS: ACHING

## 2022-08-25 ASSESSMENT — PAIN - FUNCTIONAL ASSESSMENT: PAIN_FUNCTIONAL_ASSESSMENT: PREVENTS OR INTERFERES SOME ACTIVE ACTIVITIES AND ADLS

## 2022-08-25 ASSESSMENT — PAIN DESCRIPTION - LOCATION
LOCATION: LEG
LOCATION: LEG

## 2022-08-25 NOTE — CARE COORDINATION
SS NOTE: COVID NEGATIVE 8/21. SW recd a distress call from pt's granddtr, Rohini Loyd today. If pt is dched she will be the one that is supposed to deal with his choice to go home. She was crying and stating on the phone this AM that she will come visit but will not take him home, \". .To lay on the floor and die. Cloteal Herrera Clotrafaela Herrera \" SW offered to discuss SNF placement with him in her presence when she comes in to visit him today. Pt's dtr Frank Rachel will not be available today since she is handling other medical issues with her own . Pt absolutely refuses SNF, however SW will attempt to discuss this dch option with pt again and his granddtr today. [facility] following pt for dch and has recd HHC/ wound care orders. If pt does go home this SW will contact Adult Protective Services. SS to continue. Milner Kaplan  [place]. 9:23AM.

## 2022-08-25 NOTE — PROGRESS NOTES
Physical Therapy  Physical Therapy Treatment Note/Plan of Care    Room #:  5901/8521-58  Patient Name: Debbi Cazares  YOB: 1934  MRN: [id number]    Date of Service: 8/25/2022     Tentative placement recommendation: Subacute vs Home Health Physical Therapy if patient meets goals with 24/7 Supervision  Equipment recommendation: Patient has needed equipment       Evaluating Physical Therapist: Sam Samano, PT, DPT #[id number]      Specific Provider Orders/Date/Referring Provider :     08/22/22 0815    PT eval and treat  Start:  08/22/22 0815,   End:  08/22/22 0815,   ONE TIME,   Standing Count:  1 Occurrences,   Aleks Clinton MD Acknowledge New       Admitting Diagnosis:     GIB (gastrointestinal bleeding) [K92.2]  Elevated troponin [R77.8]  CHRISTOPHER (acute kidney injury) ([facility] [N17.9]  Cellulitis of right leg [E82.164]  Non-traumatic rhabdomyolysis [M62.82]  Gastrointestinal hemorrhage with hematemesis [K92.0]      Surgery:   Visit Diagnoses         Codes    Non-traumatic rhabdomyolysis     M62.82    Cellulitis of right leg     L03.115    Elevated troponin     R77.8    CHRISTOPHER (acute kidney injury) ([facility]     N17.9            Patient Active Problem List   Diagnosis    Essential hypertension    Inflammatory arthritis    Paroxysmal A-fib ([facility]    Mixed hyperlipidemia    Pacemaker    Type 2 diabetes mellitus with stage 3b chronic kidney disease, without long-term current use of insulin ([address]    Ichthyosis    Hip fracture requiring operative repair, left, closed, initial encounter ([facility]    Stage 3 chronic kidney disease ([facility]    Bilateral leg edema    Blister of right lower leg without infection    Blister of left leg without infection    Ear lesion    Vaccination declined by patient    GIB (gastrointestinal bleeding)      ASSESSMENT of Current Deficits Patient exhibits decreased strength, balance, and endurance impairing functional mobility, transfers, gait , gait distance, and tolerance to activity. Right LE swollen and wrapped with gauze wrapping. Patient motivated and willing to participate with therapy. Pt mildly unsteady with antalgic gait cues for walker approximation. Poor insight into deficits and poor safety awareness where he is at risk of falls. PHYSICAL THERAPY  PLAN OF CARE   Physical therapy plan of care is established based on physician order, patient diagnosis and clinical assessment    Current Treatment Recommendations:    -Bed Mobility: Lower extremity exercises  and Trunk control activities   -Sitting Balance: Incorporate reaching activities to activate trunk muscles , Hands on support to maintain midline , Facilitate active trunk muscle engagement , Facilitate postural control in all planes , and Engage in core activities to allow for movement within base of support   -Standing Balance: Perform strengthening exercises in standing to promote motor control with or without upper extremity support , Instruct patient on adequate base of support to maintain balance, and Challenge balance utilizing reaching  activities beyond center of gravity    -Transfers: Provide instruction on proper hand and foot position for adequate transfer of weight onto lower extremities and use of gait device if needed, Cues for hand placement, technique and safety.  Provide stabilization to prevent fall , Facilitate weight shift forward on to lower extremities and provide necessary stabilization of bilateral lower extremities , Support transfer of weight on to lower extremities, and Assist with extension of knees trunk and hip to accept weight transfer   -Gait: Gait training, Standing activities to improve: base of support, weight shift, weight bearing , Exercises to improve trunk control, Exercises to improve hip and knee control, Performance of protected weight bearing activities, and Activities to increase weight bearing   -Endurance: Utilize Supervised activities to increase level of endurance to allow for safe functional mobility including transfers and gait  and Use graduated activities to promote good breathing techniques and provide support and education to maximize respiratory function  -Stairs: Stair training with instruction on proper technique and hand placement on rail    PT long term treatment goals are located in below grid    Patient and or family understand(s) diagnosis, prognosis, and plan of care. Frequency of treatments: Patient will be seen  daily. Prior Level of Function: Patient ambulated with wheeled walker   Rehab Potential: good - for baseline    Past medical history:   Past Medical History:   Diagnosis Date    A-fib ([address]     Anticoagulant long-term use     Atrial fibrillation (HCC)     BPH (benign prostatic hyperplasia)     CAD (coronary artery disease)     Cataract of left eye 3/11/2014    Cerebrovascular disease     CHF (congestive heart failure) (HCC)     Diabetes mellitus ([address]     Dislocated IOL (intraocular lens), posterior 11/15/2013    Hearing loss     Hyperlipidemia     Hypertension     Osteoarthritis     Peripheral vascular disease ([address]     Renal cyst     Right cataract 11/12/2013    Type 2 diabetes mellitus with stage 3b chronic kidney disease, without long-term current use of insulin ([address] 2/22/2022    Type 2 diabetes mellitus without complication ([facility])     Unspecified cerebral artery occlusion with cerebral infarction 10/2010    mild stroke     Past Surgical History:   Procedure Laterality Date    CATARACT REMOVAL WITH IMPLANT Right 11 12 2013    CATARACT REMOVAL WITH IMPLANT Left 03/11/13    EYE SURGERY Right 11/15/13    dislocated intraocular lens    JOINT REPLACEMENT      left hip    UPPER GASTROINTESTINAL ENDOSCOPY N/A 8/23/2022    EGD ESOPHAGOGASTRODUODENOSCOPY performed by Rocio Howell MD at [address] [place] [address]:  Precautions:  Up with assistance, falls, confusion, and hard of hearing     Social history: Patient lives alone in a ranch home  with 3 steps  to enter bilateral Rail  Tub shower grab bars    Equipment owned: Lorena Lennox, Shun Ford, Standard Walker, [address] chair, and Tub transfer bench,      Hema Alonso Mobility Inpatient   How much difficulty turning over in bed?: None  How much difficulty sitting down on / standing up from a chair with arms?: A Little  How much difficulty moving from lying on back to sitting on side of bed?: A Little  How much help from another person moving to and from a bed to a chair?: A Little  How much help from another person needed to walk in hospital room?: A Little  How much help from another person for climbing 3-5 steps with a railing?: A Lot  AM-PAC Inpatient Mobility Raw Score : 18  AM-PAC Inpatient T-Scale Score : 43.63  Mobility Inpatient CMS 0-100% Score: 46.58  Mobility Inpatient CMS G-Code Modifier : CK    Nursing cleared patient for PT treatment. OBJECTIVE;   Initial Evaluation  Date: 8/22/2022 Treatment Date:  8/25/2022   Short Term/ Long Term   Goals   Was pt agreeable to Eval/treatment? Yes Yes To be met in 4 days   Pain level   0/10   No complaints however ambulated with antalgic gait    Bed Mobility  Rolling: Not assessed patient in chair    Supine to sit: Not assessed patient in chair    Sit to supine: Not assessed patient in chair    Scooting: Supervision    Rolling: Independent   Supine to sit: Supervision    Sit to supine: Not assessed  pt in chair. Scooting: Supervision   Rolling: Independent    Supine to sit: Independent    Sit to supine: Independent    Scooting: Independent     Transfers Sit to stand:  Min-ModA   Sit to stand: Minimal assist of 1 Sit to stand: Independent    Ambulation     2 x 60 feet using  wheeled walker with Minimal assist of 1   for walker control, walker approximation, balance, and safety Patient ambulated 25 feet x 2 using wheeled walker with Minimal assist of 1. Verbal cues were given for safety, balance, increased base of support, walker management.  Patient Patient's/ family goals   get stronger    Time in 350  Time out 404    Total Treatment Time 14 minutes    CPT codes:  Therapeutic activities (20212)   10 minutes  1 unit(s)  Therapeutic exercises (81998)   4 minutes  0 unit(s)    Cindy Sosa PTA   LIC# [id number]

## 2022-08-25 NOTE — PROGRESS NOTES
dislocated intraocular lens    JOINT REPLACEMENT         left hip             Precautions:  Fall Risk, fall PTA, alarm, hard of hearing       Assessment of current deficits    [x] Functional mobility            [x]ADLs           [x] Strength                   [x]Cognition    [x] Functional transfers          [x] IADLs          [x] Safety Awareness   [x]Endurance    [] Fine Coordination              [x] Balance      [] Vision/perception    []Sensation      []Gross Motor Coordination  [] ROM           [] Delirium                   [] Motor Control      OT PLAN OF CARE   OT POC based on physician orders, patient diagnosis and results of clinical assessment     Frequency/Duration 1-3 days/wk for 2 weeks PRN      Specific OT Treatment Interventions to include:   * Instruction/training on adapted ADL techniques and AE recommendations to increase functional independence within precautions       * Training on energy conservation strategies, correct breathing pattern and techniques to improve independence/tolerance for self-care routine  * Functional transfer/mobility training/DME recommendations for increased independence, safety, and fall prevention  * Patient/Family education to increase follow through with safety techniques and functional independence  * Recommendation of environmental modifications for increased safety with functional transfers/mobility and ADLs  * Cognitive retraining/development of therapeutic activities to improve problem solving, judgement, memory, and attention for increased safety/participation in ADL/IADL tasks  * Therapeutic exercise to improve motor endurance, ROM, and functional strength for ADLs/functional transfers  * Therapeutic activities to facilitate/challenge dynamic balance, stand tolerance for increased safety and independence with ADLs  * Positioning to improve skin integrity, interaction with environment and functional independence     Recommended Adaptive Equipment: TBD Home Living: Lives alone, single family home, 1 story, 3 to enter with bilateral rail. Bathroom set-up: Tub/shower with tub transfer bench - patient sits on TTB and sponge bathes only. Equipment owned: wheeled walker, cane, elevated commode. Prior Level of Function: Pt reports being Mod Indep with ADLs , family assists with IADLs; ambulated independently with walker. Driving: Yes   Occupation: N/a   Enjoys: His dog       Pain Level: Pt denied pain; Nursing notified. Cognition: A&O: 3/4 - self, place, and month; Follows 2-3 step directions              Memory: fair               Sequencing: fair               Problem solving: fair               Judgement/safety: fair      [facility]   How much help for putting on and taking off regular lower body clothing?: A Lot  How much help for Bathing?: A Lot  How much help for Toileting?: A Little  How much help for putting on and taking off regular upper body clothing?: A Lot  How much help for taking care of personal grooming?: A Little  How much help for eating meals?: A Little  AM-[facility] Inpatient Daily Activity Raw Score: 15  AM-PAC Inpatient ADL T-Scale Score : 34.69  ADL Inpatient CMS 0-100% Score: 56.46  ADL Inpatient CMS G-Code Modifier : CK                Functional Assessment:     Initial Eval Status  Date: 8/22/22    Treatment Status  Date: 8/25/22 STGs = LTGs  Time frame: 10-14 days   Feeding Supervision      N/T Independent    Grooming Minimal Assist   For oral care and face shave seated at table-top     Set-up assist for face wash, mouth rinse, and application of antiperspirant while up in chair. Supervision    UB Dressing Moderate Assist     Moderate Assist to doff/don gown and tie lace around neck. Supervision    LB Dressing Dependent      Maximal Assist to doff/don socks seated in chair. Limited by decreased functional reach and LE edema. Pt introduced to reacher however patient reluctant to using AE.     Minimal Assist Bathing Moderate Assist     Moderate Assist for UB/LB sponge bathing while seated in chair. Assist to maintain standing balance while bathing periarea. Increased assist for distal LEs. Minimal Assist    Toileting Maximal Assist   Suspected for rear hygiene; set-up for use of urinal      Minimal Assist for mgmt of urinal from seated position. Increased assist suspected for rear hygiene d/t balance deficit. Minimal Assist    Bed Mobility  Supine to sit:  Not Assessed; in chair  Sit to supine:  Not Assessed; in chair      N/T; patient in chair at start/end of session. Supine to sit: Independent   Sit to supine: Independent    Functional Transfers Sit to stand: Moderate Assist to stand from chair   Stand to sit: Minimal Assist      Transfer training with verbal cues for hand placement throughout session to improve safety. Minimal Assist with sit <> stand transfer from chair x 3 reps. Verbal cues for hand placement to improve safety. Independent    Functional Mobility Minimal Assist with wheeled walker to improve balance within room to simulate household distances, verbal cues for walker sequence and safety. Minimal Assist with wheeled walker to/from doorway x 2 reps to simulate household distances. Verbal cues for walker control and safety. Moderate [place] with use of wheeled walker    Balance Sitting:     Static: good in chair     Dynamic: good in chair   Standing: fair with walker     Fair standing balance with wheeled walker requiring CGA-Min A. Sitting:     Static: good    Dynamic: good  Standing: good    Activity Tolerance fair    Fair/fair plus  Increase standing tolerance >3 minutes for improved engagement with functional transfers and indep in ADLs      Visual/  Perceptual Glasses: Yes      Reports changes in vision since admission: No       NA         Comments: RN cleared patient for OT. Upon arrival to the room the patient was in chair.  At end of the session, the patient was in chair. Call light and phone within reach. Pt required verbal cues and instruction as noted above for safe facilitation and completion of tasks. Therapist provided skilled monitoring of the patient's response during treatment session. Prior to and at the end of session, environmental modifications/line management completed for patients safety and efficiency of treatment session. Overall, the patient demonstrates decreased independence with ADLs and functional transfers and mobility. Pt limited by generalized weakness, balance, and safety awareness. Pt would benefit from continued skilled OT to increase independence with BADL's and IADL's. Treatment: OT treatment provided this date includes:  Instructions/training on safety, sequencing, and adapted techniques for completion of ADLs. Instruction/training on safe functional mobility/transfer techniques including hand and feet placement   Instruction/training on energy conservation/work simplification for completion of ADLs    Pt has made fair progress towards set goals. Continue with current plan of care       Treatment time includes thorough review of current medical information, gathering information on past medical history/social history and prior level of function, completion of standardized testing/informal observation of tasks, assessment of data, and development of POC/Goals.     Time In: 9:20 AM    Time Out: 9:45 AM                  Min Units   OT Eval Low 95835     OT Eval Medium 79608     OT Eval High 35631     OT Re-Eval 78501          ADL/Self Care 01234 15 1   Therapeutic Activities 77831 10 1   Therapeutic Ex 48359     Orthotic Management 81767     Neuro Re-Ed 25366     Non-Billable Time     TOTAL TIMED TREATMENT 25 2     Georgi Johan OTR/L #[id number]

## 2022-08-25 NOTE — PLAN OF CARE
Problem: Discharge Planning  Goal: Discharge to home or other facility with appropriate resources  8/25/2022 1545 by Fermin Hastings RN  Outcome: Progressing  Flowsheets (Taken 8/25/2022 0800)  Discharge to home or other facility with appropriate resources:   Identify barriers to discharge with patient and caregiver   Arrange for needed discharge resources and transportation as appropriate   Identify discharge learning needs (meds, wound care, etc)  8/25/2022 0508 by Yusef Branch RN  Outcome: Progressing     Problem: Pain  Goal: Verbalizes/displays adequate comfort level or baseline comfort level  8/25/2022 1545 by Fermin Hastings RN  Outcome: Progressing  Flowsheets (Taken 8/25/2022 0800)  Verbalizes/displays adequate comfort level or baseline comfort level:   Encourage patient to monitor pain and request assistance   Assess pain using appropriate pain scale   Administer analgesics based on type and severity of pain and evaluate response   Implement non-pharmacological measures as appropriate and evaluate response  8/25/2022 0508 by Yusef Branch RN  Outcome: Progressing     Problem: Skin/Tissue Integrity  Goal: Absence of new skin breakdown  Description: 1. Monitor for areas of redness and/or skin breakdown  2. Assess vascular access sites hourly  3. Every 4-6 hours minimum:  Change oxygen saturation probe site  4. Every 4-6 hours:  If on nasal continuous positive airway pressure, respiratory therapy assess nares and determine need for appliance change or resting period.   8/25/2022 1545 by Fermin Hastings RN  Outcome: Progressing  8/25/2022 0508 by Yusef Branch RN  Outcome: Progressing     Problem: ABCDS Injury Assessment  Goal: Absence of physical injury  8/25/2022 1545 by Fermin Hastings RN  Outcome: Progressing  Flowsheets (Taken 8/25/2022 7799)  Absence of Physical Injury: Implement safety measures based on patient assessment  8/25/2022 0508 by Yusef Branch RN  Outcome: Progressing     Problem: Safety - Adult  Goal: Free from fall injury  8/25/2022 1545 by Lily Hernandez RN  Outcome: Progressing  Flowsheets (Taken 8/25/2022 0828)  Free From Fall Injury: Instruct family/caregiver on patient safety  8/25/2022 0508 by Zarina Tejeda RN  Outcome: Progressing     Problem: Chronic Conditions and Co-morbidities  Goal: Patient's chronic conditions and co-morbidity symptoms are monitored and maintained or improved  8/25/2022 1545 by Liyl Hernandez RN  Outcome: Progressing  Flowsheets (Taken 8/25/2022 0800)  Care Plan - Patient's Chronic Conditions and Co-Morbidity Symptoms are Monitored and Maintained or Improved:   Monitor and assess patient's chronic conditions and comorbid symptoms for stability, deterioration, or improvement   Collaborate with multidisciplinary team to address chronic and comorbid conditions and prevent exacerbation or deterioration  8/25/2022 0508 by Zarina Tejeda RN  Outcome: Progressing     Problem: Nutrition Deficit:  Goal: Optimize nutritional status  8/25/2022 1545 by Lily Hernandez RN  Outcome: Progressing  8/25/2022 0508 by Zarina Tejeda RN  Outcome: Progressing

## 2022-08-25 NOTE — PROGRESS NOTES
OCCUPATIONAL THERAPY BEDSIDE TREATMENT NOTE   [facility] CTR  Surinder [place]     Date:2022  Patient Name: Cristine Quiroz  MRN: [id number]  : 1934  Room: [address] [place] [address]     Evaluating OT: Veronica Koehler OTR/L #[id number]      Referring Provider and Specific Provider Orders/Date:      22   OT eval and treat  Start:  22,   End:  22,   ONE TIME,   Standing Count:  1 Occurrences,   Elena Dubois MD       Placement Recommendation: Subacute vs Home Health Occupational Therapy if patient is able to meet goals         Diagnosis:   1. Gastrointestinal hemorrhage with hematemesis    2. Non-traumatic rhabdomyolysis    3. Cellulitis of right leg    4. Elevated troponin    5.  CHRISTOPHER (acute kidney injury) [facility])         Surgery: None       Pertinent Medical History:       Past Medical History           Past Medical History:   Diagnosis Date    A-fib ([address]      Anticoagulant long-term use      Atrial fibrillation (HCC)      BPH (benign prostatic hyperplasia)      CAD (coronary artery disease)      Cataract of left eye 3/11/2014    Cerebrovascular disease      CHF (congestive heart failure) (HCC)      Diabetes mellitus ([address]      Dislocated IOL (intraocular lens), posterior 11/15/2013    Hearing loss      Hyperlipidemia      Hypertension      Osteoarthritis      Peripheral vascular disease ([address]      Renal cyst      Right cataract 2013    Type 2 diabetes mellitus with stage 3b chronic kidney disease, without long-term current use of insulin ([address] 2022    Type 2 diabetes mellitus without complication ([address]      Unspecified cerebral artery occlusion with cerebral infarction 10/2010     mild stroke            Past Surgical History             Past Surgical History:   Procedure Laterality Date    CATARACT REMOVAL WITH IMPLANT Right 2013    CATARACT REMOVAL WITH IMPLANT Left 13    EYE SURGERY Right TBD       Home Living: Lives alone, single family home, 1 story, 3 to enter with bilateral rail. Bathroom set-up: Tub/shower with tub transfer bench - patient sits on TTB and sponge bathes only. Equipment owned: wheeled walker, cane, elevated commode. Prior Level of Function: Pt reports being Mod Indep with ADLs , family assists with IADLs; ambulated independently with walker. Driving: Yes   Occupation: N/a   Enjoys: His dog       Pain Level: Pt denied pain; Nursing notified. Cognition: A&O: 3/4 - self, place, and month; Follows 2-3 step directions              Memory: fair               Sequencing: fair               Problem solving: fair               Judgement/safety: fair      [facility]   How much help for putting on and taking off regular lower body clothing?: A Lot  How much help for Bathing?: A Lot  How much help for Toileting?: A Little  How much help for putting on and taking off regular upper body clothing?: A Lot  How much help for taking care of personal grooming?: A Little  How much help for eating meals?: A Little  AM-[facility] Inpatient Daily Activity Raw Score: 15  AM-[facility] Inpatient ADL T-Scale Score : 34.69  ADL Inpatient CMS 0-100% Score: 56.46  ADL Inpatient CMS G-Code Modifier : CK               Functional Assessment:     Initial Eval Status  Date: 8/22/22    Treatment Status  Date: 8/25/22 STGs = LTGs  Time frame: 10-14 days   Feeding Supervision      N/T Independent    Grooming Minimal Assist   For oral care and face shave seated at table-top     N/T    Supervision    UB Dressing Moderate Assist    N/T Supervision    LB Dressing Dependent      N/T    Minimal Assist    Bathing Moderate Assist     N/T    Minimal Assist    Toileting Maximal Assist   Suspected for rear hygiene; set-up for use of urinal      Minimal Assist for transfer to/from bedside commode. Moderate Assist for thoroughness with rear hygiene while standing supported at walker.  CGA/Min A to maintain standing balance. Minimal Assist    Bed Mobility  Supine to sit:  Not Assessed; in chair  Sit to supine:  Not Assessed; in chair      Supervision with supine <> sit ; verbal cues for safety awareness and instructions for call bell as patient attempting to get out of bed multiple attempts despite bed alarm going off. Supine to sit: Independent   Sit to supine: Independent    Functional Transfers Sit to stand: Moderate Assist to stand from chair   Stand to sit: Minimal Assist      Transfer training with verbal cues for hand placement throughout session to improve safety. Minimal Assist with sit <> stand transfer EOB and bedside commode. Verbal cues for hand placement to prevent pulling up on walker. Independent    Functional Mobility Minimal Assist with wheeled walker to improve balance within room to simulate household distances, verbal cues for walker sequence and safety. Minimal Assist with wheeled walker to/from EOB and bedside commode. Verbal cues for safety. Pt stated, \"I don't need that walker\" however patient is a fall risk without AD. Moderate [place] with use of wheeled walker    Balance Sitting:     Static: good in chair     Dynamic: good in chair   Standing: fair with walker     Fair standing balance with wheeled walker requiring CGA-Min A. Sitting:     Static: good    Dynamic: good  Standing: good    Activity Tolerance fair    Fair/fair plus  Increase standing tolerance >3 minutes for improved engagement with functional transfers and indep in ADLs      Visual/  Perceptual Glasses: Yes      Reports changes in vision since admission: No       NA       Comments: RN cleared patient for OT. Upon arrival to the room the patient was at EOB attempting to stand up - bed alarm going off (third time this morning OT entered room to assist patient back to supine). OT entered room and assisted patient to bedside commode. At end of the session, the patient was supine, alarm on.  Call light and phone within reach. Pt required verbal cues and instruction as noted above for safe facilitation and completion of tasks. Therapist provided skilled monitoring of the patient's response during treatment session. Prior to and at the end of session, environmental modifications/line management completed for patients safety and efficiency of treatment session. Overall, the patient demonstrates decreased independence with ADLs and functional transfers and mobility. Pt would benefit from continued skilled OT to increase independence with BADL's and IADL's. Treatment: OT treatment provided this date includes:  Instructions/training on safety, sequencing, and adapted techniques for completion of ADLs. Instruction/training on safe functional mobility/transfer techniques including hand and feet placement     Pt has made fair progress towards set goals. Continue with current plan of care       Treatment time includes thorough review of current medical information, gathering information on past medical history/social history and prior level of function, completion of standardized testing/informal observation of tasks, assessment of data, and development of POC/Goals.     Time In: 11:05 AM    Time Out: 11:15 AM                  Min Units   OT Eval Low 36955     OT Eval Medium 66121     OT Eval High 42466     OT Re-Eval 27110          ADL/Self Care 19894 10 1   Therapeutic Activities 86208     Therapeutic Ex 54530     Orthotic Management 68465     Neuro Re-Ed 32870     Non-Billable Time     TOTAL TIMED TREATMENT 10 1     Partridge Emmanuel OTR/L #[id number]

## 2022-08-25 NOTE — PLAN OF CARE

## 2022-08-25 NOTE — PROGRESS NOTES
Department of Internal Medicine  General Internal Medicine  Attending Progress Note  Chief Complaint   Patient presents with    Fall     SUBJECTIVE:    Reports that he is doing well. Denied fever and chills. No chest pain. Noted that his leg is still swollen, but much better than admission. Still think that patient despite clinical improvement, he still needs more wound care.      OBJECTIVE      Medications    Current Facility-Administered Medications: ramipril (ALTACE) capsule 2.5 mg, 2.5 mg, Oral, Daily  furosemide (LASIX) tablet 20 mg, 20 mg, Oral, Daily  glucose chewable tablet 16 g, 4 tablet, Oral, PRN  dextrose bolus 10% 125 mL, 125 mL, IntraVENous, PRN **OR** dextrose bolus 10% 250 mL, 250 mL, IntraVENous, PRN  glucagon (rDNA) injection 1 mg, 1 mg, SubCUTAneous, PRN  dextrose 10 % infusion, , IntraVENous, Continuous PRN  Minerin Creme CREA, , Topical, Daily  amoxicillin-clavulanate (AUGMENTIN) 875-125 MG per tablet 1 tablet, 1 tablet, Oral, 2 times per day  doxycycline hyclate (VIBRAMYCIN) capsule 100 mg, 100 mg, Oral, 2 times per day  apixaban (ELIQUIS) tablet 5 mg, 5 mg, Oral, BID  pantoprazole (PROTONIX) tablet 40 mg, 40 mg, Oral, BID AC  oxyCODONE-acetaminophen (PERCOCET) 5-325 MG per tablet 1 tablet, 1 tablet, Oral, Q4H PRN  sodium chloride flush 0.9 % injection 5-40 mL, 5-40 mL, IntraVENous, 2 times per day  sodium chloride flush 0.9 % injection 5-40 mL, 5-40 mL, IntraVENous, PRN  0.9 % sodium chloride infusion, , IntraVENous, PRN  ondansetron (ZOFRAN-ODT) disintegrating tablet 4 mg, 4 mg, Oral, Q8H PRN **OR** ondansetron (ZOFRAN) injection 4 mg, 4 mg, IntraVENous, Q6H PRN  polyethylene glycol (GLYCOLAX) packet 17 g, 17 g, Oral, Daily PRN  acetaminophen (TYLENOL) tablet 650 mg, 650 mg, Oral, Q6H PRN **OR** acetaminophen (TYLENOL) suppository 650 mg, 650 mg, Rectal, Q6H PRN  perflutren lipid microspheres (DEFINITY) injection 1.65 mg, 1.5 mL, IntraVENous, ONCE PRN  pravastatin (PRAVACHOL) tablet 40 mg, 40 mg, Oral, Nightly  insulin lispro (HUMALOG) injection vial 0-4 Units, 0-4 Units, SubCUTAneous, TID WC  insulin lispro (HUMALOG) injection vial 0-4 Units, 0-4 Units, SubCUTAneous, Nightly  brimonidine (ALPHAGAN) 0.2 % ophthalmic solution 1 drop, 1 drop, Both Eyes, Q12H **AND** timolol (TIMOPTIC) 0.5 % ophthalmic solution 1 drop, 1 drop, Both Eyes, Q12H  [Held by provider] cefTRIAXone (ROCEPHIN) 1,000 mg in sterile water 10 mL IV syringe, 1,000 mg, IntraVENous, Daily  Physical    VITALS:  BP (!) 155/77   Pulse 63   Temp 97.7 °F (36.5 °C) (Oral)   Resp 18   Ht 5' 9\" (1.753 m)   Wt 180 lb (81.6 kg)   SpO2 96%   BMI 26.58 kg/m²   CONSTITUTIONAL:  awake, alert, and cooperative  EYES:  extra-ocular muscles intact and vision intact  ENT:  normocepalic, without obvious abnormality  NECK:  supple, symmetrical, trachea midline  BACK:  symmetric  LUNGS:  no increased work of breathing, no retractions, and clear to auscultation  CARDIOVASCULAR:  normal apical pulses and normal S1 and S2  ABDOMEN:  normal bowel sounds, non-distended, and non-tender  MUSCULOSKELETAL:  there is no redness, warmth, or swelling of the joints  NEUROLOGIC:  Mental Status Exam:  Level of Alertness:   awake  Orientation:   person, place, time  SKIN:  no bruising or bleeding  Data    CBC:   Lab Results   Component Value Date/Time    WBC 10.3 08/25/2022 06:48 AM    RBC 4.19 08/25/2022 06:48 AM    HGB 13.4 08/25/2022 06:48 AM    HCT 41.6 08/25/2022 06:48 AM    MCV 99.3 08/25/2022 06:48 AM    MCH 32.0 08/25/2022 06:48 AM    MCHC 32.2 08/25/2022 06:48 AM    RDW 13.6 08/25/2022 06:48 AM     08/25/2022 06:48 AM    MPV 10.4 08/25/2022 06:48 AM     BMP:    Lab Results   Component Value Date/Time     08/25/2022 06:48 AM    K 4.0 08/25/2022 06:48 AM    K 3.9 03/17/2022 04:25 PM     08/25/2022 06:48 AM    CO2 27 08/25/2022 06:48 AM    BUN 22 08/25/2022 06:48 AM    LABALBU 3.1 08/23/2022 09:00 AM    LABALBU 4.1 03/05/2012 08:54 AM CREATININE 1.2 08/25/2022 06:48 AM    CREATININE 1.2 03/23/2020 12:00 AM    CREATININE 1.2 03/23/2020 12:00 AM    CALCIUM 9.5 08/25/2022 06:48 AM    GFRAA >60 08/25/2022 06:48 AM    LABGLOM 57 08/25/2022 06:48 AM    GLUCOSE 91 08/25/2022 06:48 AM    GLUCOSE 137 03/05/2012 08:54 AM       ASSESSMENT AND PLAN      1.  GIB (gastrointestinal bleeding): s/p EGD. No source of hematemesis noted. Hgb has remained stable. Continue to monitor    2. Sepsis due to Lower extremity cellulitis: much improved. Continue current abx. Doxy and Augmentin. 3.  Mild rhabdomyolysis: resolved. Already discontinued abx. 4.  Hypertension Essential: continue home meds    5. Lower extremity swelling: improving. Continue current management. Wound care per wound team    6. HL: on statin    7. CAD: conservative management. Aware of CT report of lower extremity calcification. No further intervention per daughter and patient. 8.  Hx of atrial Fibrillation: continue eliquis. Rate is well controlled    9. Type 2 DM: stop Glipizide. No need for accucheck as BGL has remained stable. Concern for hypoglycemia. Overall stable    Patient not able to drive due to right lower extremity swelling and pain. Will need to to work with his PCP to evaluate need for ability to drive. 10.  CHRISTOPHER: much improved.

## 2022-08-26 PROCEDURE — 6370000000 HC RX 637 (ALT 250 FOR IP): Performed by: INTERNAL MEDICINE

## 2022-08-26 PROCEDURE — 97116 GAIT TRAINING THERAPY: CPT

## 2022-08-26 PROCEDURE — 2060000000 HC ICU INTERMEDIATE R&B

## 2022-08-26 PROCEDURE — 99232 SBSQ HOSP IP/OBS MODERATE 35: CPT | Performed by: INTERNAL MEDICINE

## 2022-08-26 RX ORDER — FUROSEMIDE 20 MG/1
20 TABLET ORAL ONCE
Status: COMPLETED | OUTPATIENT
Start: 2022-08-26 | End: 2022-08-26

## 2022-08-26 RX ADMIN — RAMIPRIL 2.5 MG: 2.5 CAPSULE ORAL at 08:48

## 2022-08-26 RX ADMIN — DOXYCYCLINE HYCLATE 100 MG: 100 CAPSULE ORAL at 08:48

## 2022-08-26 RX ADMIN — Medication: at 08:47

## 2022-08-26 RX ADMIN — OXYCODONE AND ACETAMINOPHEN 1 TABLET: 5; 325 TABLET ORAL at 13:40

## 2022-08-26 RX ADMIN — BRIMONIDINE TARTRATE 1 DROP: 2 SOLUTION OPHTHALMIC at 21:49

## 2022-08-26 RX ADMIN — OXYCODONE AND ACETAMINOPHEN 1 TABLET: 5; 325 TABLET ORAL at 21:55

## 2022-08-26 RX ADMIN — TIMOLOL MALEATE 1 DROP: 5 SOLUTION OPHTHALMIC at 21:49

## 2022-08-26 RX ADMIN — APIXABAN 5 MG: 5 TABLET, FILM COATED ORAL at 08:48

## 2022-08-26 RX ADMIN — DOXYCYCLINE HYCLATE 100 MG: 100 CAPSULE ORAL at 21:48

## 2022-08-26 RX ADMIN — AMOXICILLIN AND CLAVULANATE POTASSIUM 1 TABLET: 875; 125 TABLET, FILM COATED ORAL at 21:48

## 2022-08-26 RX ADMIN — FUROSEMIDE 20 MG: 20 TABLET ORAL at 08:48

## 2022-08-26 RX ADMIN — FUROSEMIDE 20 MG: 20 TABLET ORAL at 13:40

## 2022-08-26 RX ADMIN — APIXABAN 5 MG: 5 TABLET, FILM COATED ORAL at 21:48

## 2022-08-26 RX ADMIN — BRIMONIDINE TARTRATE 1 DROP: 2 SOLUTION OPHTHALMIC at 08:47

## 2022-08-26 RX ADMIN — TIMOLOL MALEATE 1 DROP: 5 SOLUTION OPHTHALMIC at 08:47

## 2022-08-26 RX ADMIN — PANTOPRAZOLE SODIUM 40 MG: 40 TABLET, DELAYED RELEASE ORAL at 06:46

## 2022-08-26 RX ADMIN — PRAVASTATIN SODIUM 40 MG: 20 TABLET ORAL at 21:48

## 2022-08-26 RX ADMIN — AMOXICILLIN AND CLAVULANATE POTASSIUM 1 TABLET: 875; 125 TABLET, FILM COATED ORAL at 08:48

## 2022-08-26 RX ADMIN — PANTOPRAZOLE SODIUM 40 MG: 40 TABLET, DELAYED RELEASE ORAL at 15:15

## 2022-08-26 RX ADMIN — OXYCODONE AND ACETAMINOPHEN 1 TABLET: 5; 325 TABLET ORAL at 02:26

## 2022-08-26 ASSESSMENT — PAIN DESCRIPTION - PAIN TYPE: TYPE: CHRONIC PAIN

## 2022-08-26 ASSESSMENT — PAIN DESCRIPTION - ORIENTATION
ORIENTATION: RIGHT;LEFT
ORIENTATION: RIGHT

## 2022-08-26 ASSESSMENT — PAIN SCALES - GENERAL
PAINLEVEL_OUTOF10: 3
PAINLEVEL_OUTOF10: 7
PAINLEVEL_OUTOF10: 7
PAINLEVEL_OUTOF10: 0
PAINLEVEL_OUTOF10: 5
PAINLEVEL_OUTOF10: 0
PAINLEVEL_OUTOF10: 7

## 2022-08-26 ASSESSMENT — PAIN DESCRIPTION - DESCRIPTORS
DESCRIPTORS: ACHING;BURNING
DESCRIPTORS: BURNING
DESCRIPTORS: BURNING
DESCRIPTORS: ACHING

## 2022-08-26 ASSESSMENT — PAIN DESCRIPTION - LOCATION
LOCATION: LEG

## 2022-08-26 ASSESSMENT — PAIN - FUNCTIONAL ASSESSMENT
PAIN_FUNCTIONAL_ASSESSMENT: PREVENTS OR INTERFERES SOME ACTIVE ACTIVITIES AND ADLS
PAIN_FUNCTIONAL_ASSESSMENT: ACTIVITIES ARE NOT PREVENTED

## 2022-08-26 ASSESSMENT — PAIN SCALES - WONG BAKER: WONGBAKER_NUMERICALRESPONSE: 0

## 2022-08-26 ASSESSMENT — PAIN DESCRIPTION - ONSET: ONSET: ON-GOING

## 2022-08-26 ASSESSMENT — PAIN DESCRIPTION - FREQUENCY: FREQUENCY: INTERMITTENT

## 2022-08-26 NOTE — PLAN OF CARE
Problem: Discharge Planning  Goal: Discharge to home or other facility with appropriate resources  Outcome: Progressing  Flowsheets (Taken 8/26/2022 0830)  Discharge to home or other facility with appropriate resources:   Identify barriers to discharge with patient and caregiver   Arrange for needed discharge resources and transportation as appropriate   Identify discharge learning needs (meds, wound care, etc)     Problem: Pain  Goal: Verbalizes/displays adequate comfort level or baseline comfort level  Outcome: Progressing     Problem: Skin/Tissue Integrity  Goal: Absence of new skin breakdown  Description: 1. Monitor for areas of redness and/or skin breakdown  2. Assess vascular access sites hourly  3. Every 4-6 hours minimum:  Change oxygen saturation probe site  4. Every 4-6 hours:  If on nasal continuous positive airway pressure, respiratory therapy assess nares and determine need for appliance change or resting period.   Outcome: Progressing     Problem: ABCDS Injury Assessment  Goal: Absence of physical injury  Outcome: Progressing  Flowsheets (Taken 8/26/2022 0830)  Absence of Physical Injury: Implement safety measures based on patient assessment     Problem: Safety - Adult  Goal: Free from fall injury  Outcome: Progressing  Flowsheets (Taken 8/26/2022 0830)  Free From Fall Injury: Instruct family/caregiver on patient safety     Problem: Chronic Conditions and Co-morbidities  Goal: Patient's chronic conditions and co-morbidity symptoms are monitored and maintained or improved  Outcome: Progressing  Flowsheets (Taken 8/26/2022 0830)  Care Plan - Patient's Chronic Conditions and Co-Morbidity Symptoms are Monitored and Maintained or Improved: Monitor and assess patient's chronic conditions and comorbid symptoms for stability, deterioration, or improvement     Problem: Nutrition Deficit:  Goal: Optimize nutritional status  Outcome: Progressing

## 2022-08-26 NOTE — PROGRESS NOTES
Department of Internal Medicine  General Internal Medicine  Attending Progress Note  Chief Complaint   Patient presents with    Fall     SUBJECTIVE:    Reports that he is doing better. Noted that his leg swelling is improving gradually. No fever or chill. No chest pain.  Aware of plan to watch his renal function and cbc in am.    OBJECTIVE      Medications    Current Facility-Administered Medications: ramipril (ALTACE) capsule 2.5 mg, 2.5 mg, Oral, Daily  furosemide (LASIX) tablet 20 mg, 20 mg, Oral, Daily  Minerin Creme CREA, , Topical, Daily  amoxicillin-clavulanate (AUGMENTIN) 875-125 MG per tablet 1 tablet, 1 tablet, Oral, 2 times per day  doxycycline hyclate (VIBRAMYCIN) capsule 100 mg, 100 mg, Oral, 2 times per day  apixaban (ELIQUIS) tablet 5 mg, 5 mg, Oral, BID  pantoprazole (PROTONIX) tablet 40 mg, 40 mg, Oral, BID AC  oxyCODONE-acetaminophen (PERCOCET) 5-325 MG per tablet 1 tablet, 1 tablet, Oral, Q4H PRN  sodium chloride flush 0.9 % injection 5-40 mL, 5-40 mL, IntraVENous, 2 times per day  sodium chloride flush 0.9 % injection 5-40 mL, 5-40 mL, IntraVENous, PRN  0.9 % sodium chloride infusion, , IntraVENous, PRN  ondansetron (ZOFRAN-ODT) disintegrating tablet 4 mg, 4 mg, Oral, Q8H PRN **OR** ondansetron (ZOFRAN) injection 4 mg, 4 mg, IntraVENous, Q6H PRN  polyethylene glycol (GLYCOLAX) packet 17 g, 17 g, Oral, Daily PRN  acetaminophen (TYLENOL) tablet 650 mg, 650 mg, Oral, Q6H PRN **OR** acetaminophen (TYLENOL) suppository 650 mg, 650 mg, Rectal, Q6H PRN  perflutren lipid microspheres (DEFINITY) injection 1.65 mg, 1.5 mL, IntraVENous, ONCE PRN  pravastatin (PRAVACHOL) tablet 40 mg, 40 mg, Oral, Nightly  brimonidine (ALPHAGAN) 0.2 % ophthalmic solution 1 drop, 1 drop, Both Eyes, Q12H **AND** timolol (TIMOPTIC) 0.5 % ophthalmic solution 1 drop, 1 drop, Both Eyes, Q12H  [Held by provider] cefTRIAXone (ROCEPHIN) 1,000 mg in sterile water 10 mL IV syringe, 1,000 mg, IntraVENous, Daily  Physical    VITALS:  BP advance treatment  Hx of A. Fib: rate controled  Mild Rhabdo: resolved  Right leg wound. Continue wound care. Sepsis due to #3.  Much improved  Discharge plan if labs remains stable in am. Will discharge with home care

## 2022-08-26 NOTE — PROGRESS NOTES
Physical Therapy  Physical Therapy Treatment Note/Plan of Care    Room #:  2411/6029-40  Patient Name: Danial Rubio  YOB: 1934  MRN: [id number]    Date of Service: 8/26/2022     Tentative placement recommendation: Subacute vs Home Health Physical Therapy if patient meets goals with 24/7 Supervision  Equipment recommendation: Patient has needed equipment       Evaluating Physical Therapist: Simon Zamudio, PT, DPT #[id number]      Specific Provider Orders/Date/Referring Provider :     08/22/22 0815    PT eval and treat  Start:  08/22/22 0815,   End:  08/22/22 0815,   ONE TIME,   Standing Count:  1 Occurrences,   Raquel Polanco MD Acknowledge New       Admitting Diagnosis:     GIB (gastrointestinal bleeding) [K92.2]  Elevated troponin [R77.8]  CHRISTOPHER (acute kidney injury) ([facility] [N17.9]  Cellulitis of right leg [B47.391]  Non-traumatic rhabdomyolysis [M62.82]  Gastrointestinal hemorrhage with hematemesis [K92.0]      Surgery:   Visit Diagnoses         Codes    Non-traumatic rhabdomyolysis     M62.82    Cellulitis of right leg     L03.115    Elevated troponin     R77.8    CHRISTOPHER (acute kidney injury) ([facility]     N17.9            Patient Active Problem List   Diagnosis    Essential hypertension    Inflammatory arthritis    Paroxysmal A-fib ([facility]    Mixed hyperlipidemia    Pacemaker    Type 2 diabetes mellitus with stage 3b chronic kidney disease, without long-term current use of insulin ([address]    Ichthyosis    Hip fracture requiring operative repair, left, closed, initial encounter ([facility]    Stage 3 chronic kidney disease ([facility]    Bilateral leg edema    Blister of right lower leg without infection    Blister of left leg without infection    Ear lesion    Vaccination declined by patient    GIB (gastrointestinal bleeding)      ASSESSMENT of Current Deficits Patient exhibits decreased strength, balance, and endurance impairing functional mobility, transfers, gait , gait distance, and tolerance to activity. allow for safe functional mobility including transfers and gait  and Use graduated activities to promote good breathing techniques and provide support and education to maximize respiratory function  -Stairs: Stair training with instruction on proper technique and hand placement on rail    PT long term treatment goals are located in below grid    Patient and or family understand(s) diagnosis, prognosis, and plan of care. Frequency of treatments: Patient will be seen daily. Prior Level of Function: Patient ambulated with wheeled walker   Rehab Potential: good - for baseline    Past medical history:   Past Medical History:   Diagnosis Date    A-fib ([address]     Anticoagulant long-term use     Atrial fibrillation (HCC)     BPH (benign prostatic hyperplasia)     CAD (coronary artery disease)     Cataract of left eye 3/11/2014    Cerebrovascular disease     CHF (congestive heart failure) (HCC)     Diabetes mellitus ([address]     Dislocated IOL (intraocular lens), posterior 11/15/2013    Hearing loss     Hyperlipidemia     Hypertension     Osteoarthritis     Peripheral vascular disease ([address]     Renal cyst     Right cataract 11/12/2013    Type 2 diabetes mellitus with stage 3b chronic kidney disease, without long-term current use of insulin ([address] 2/22/2022    Type 2 diabetes mellitus without complication ([facility])     Unspecified cerebral artery occlusion with cerebral infarction 10/2010    mild stroke     Past Surgical History:   Procedure Laterality Date    CATARACT REMOVAL WITH IMPLANT Right 11 12 2013    CATARACT REMOVAL WITH IMPLANT Left 03/11/13    EYE SURGERY Right 11/15/13    dislocated intraocular lens    JOINT REPLACEMENT      left hip    UPPER GASTROINTESTINAL ENDOSCOPY N/A 8/23/2022    EGD ESOPHAGOGASTRODUODENOSCOPY performed by Chitra Feliz MD at [address] [place] [address]:  Precautions:  Up with assistance, falls, confusion, and hard of hearing     Social history: Patient lives alone in a ranch home  with 3 steps  to enter bilateral Rail  Tub shower grab bars    Equipment owned: [place] [employer], [employer], Standard Walker, [address] chair, and Tub transfer bench,      Hema Alonso Mobility Inpatient   How much difficulty turning over in bed?: A Little  How much difficulty sitting down on / standing up from a chair with arms?: A Little  How much difficulty moving from lying on back to sitting on side of bed?: A Little  How much help from another person moving to and from a bed to a chair?: A Little  How much help from another person needed to walk in hospital room?: A Little  How much help from another person for climbing 3-5 steps with a railing?: A Lot  AM-PAC Inpatient Mobility Raw Score : 17  AM-PAC Inpatient T-Scale Score : 42.13  Mobility Inpatient CMS 0-100% Score: 50.57  Mobility Inpatient CMS G-Code Modifier : CK    Nursing cleared patient for PT treatment. OBJECTIVE;   Initial Evaluation  Date: 8/22/2022 Treatment Date:  8/26/2022   Short Term/ Long Term   Goals   Was pt agreeable to Eval/treatment? Yes Yes To be met in 4 days   Pain level   0/10   No number assigned to right lower extremity pain    Bed Mobility  Rolling: Not assessed patient in chair    Supine to sit: Not assessed patient in chair    Sit to supine: Not assessed patient in chair    Scooting: Supervision    Rolling: Independent   Supine to sit: Supervision    Sit to supine: Not assessed  pt in chair. Scooting: Supervision   Rolling: Independent    Supine to sit: Independent    Sit to supine: Independent    Scooting: Independent     Transfers Sit to stand:  Min-ModA   Sit to stand: Minimal assist of 1 Sit to stand: Independent    Ambulation     2 x 60 feet using  wheeled walker with Minimal assist of 1   for walker control, walker approximation, balance, and safety Patient ambulated 20 feet x 4 using wheeled walker with Minimal assist of 1. Verbal cues were given for safety, balance, increased base of support, walker management. Patient with antalgic gait and step to pattern. > 125 feet using  wheeled walker with Modified Independent    Stair negotiation: ascended and descended   Not assessed  Not assessed   3 steps with 2 HR with Mod I   ROM Within functional limits    Increase range of motion 10% of affected joints    Strength BUE:  refer to OT eval  RLE:  4-/5  LLE:  4-/5  Increase strength in affected mm groups by 1/3 grade   Balance Sitting EOB:  fair +  Dynamic Standing:  fair  Sitting EOB: fair+  Dynamic Standing: fair using wheeled walker Sitting EOB:  good   Dynamic Standing: fair +     Patient is Alert & Oriented x person, place, time, and situation and follows directions    Sensation: Patient  denies numbness/tingling   Edema:  no   Endurance: fair      Vitals: room air   Blood Pressure at rest  Blood Pressure during session    Heart Rate at rest  Heart Rate during session    SPO2 at rest %  SPO2 during session %     Patient education  Patient educated on role of Physical Therapy, risks of immobility, safety and plan of care, energy conservation,  importance of mobility while in hospital , ankle pumps, quad set and glut set for edema control, blood clot prevention, and safety      Patient response to education:   Pt verbalized understanding Pt demonstrated skill Pt requires further education in this area   Yes Partial Yes      Treatment: Patient practiced and was instructed/facilitated in the following treatment: Patient Sat edge of bed 5 minutes with Supervision  to increase dynamic sitting balance and activity tolerance. Pt performed bed mobility, transfers, exercises and ambulation in hallway. Therapeutic Exercises: ankle pumps and long arc quad x 10 reps. At end of session, patient in chair with  right lower extremity elevated on foot stool, call light and phone within reach, all lines and tubes intact, nursing notified.       Patient would benefit from continued skilled Physical Therapy to improve functional independence and quality of life.        Patient's/ family goals   get stronger    Time in 0900  Time out 0913    Total Treatment Time 13 minutes    CPT codes:  Gait Training (02369) 13 minutes 1 unit(s)    Dwayne Méndez, PTA   LIC# [id number]

## 2022-08-26 NOTE — CARE COORDINATION
SS NOTE: COVID NEGATIVE 8/21. SW met with pt again today to offer suggestion of SNF placement. Pt became agitated and refused once again. He plans on going home tomorrow and a neighbor friend Samia Sanchez will transport him. SW did make a referral to Haile Elmore at [facility] today. She will investigate with her supervisor whether they will be opening the case. SW also made contact with pt's dtr Adamaris Linares today and encouraged her also to call APS with her concerns- she agreed to do so. CHELY also went over with Adamaris Linares the process of trying to obtain a Legal Guardian for pt. She is aware of possible dch of pt tomorrow. Makenzie Rico is following pt for dch and has recd HHC/ wound care orders. SS to continue. Gee Parmar. 3:19PM.

## 2022-08-26 NOTE — CARE COORDINATION
SS NOTE: COVID NEGATIVE 8/21. Per pt's granddtr, Devonte Perry pt's neighbor / friend Radha Skaggs will be available today to transport pt home if dched. Pt absolutely refuses SNF. Adrien Roman is following pt for dch and has recd HHC/ wound care orders. When pt does go home this SW will contact Adult Protective Services. SS to continue. PATY Leone.8/26/2022. 9:00AM.

## 2022-08-27 VITALS
RESPIRATION RATE: 16 BRPM | BODY MASS INDEX: 26.66 KG/M2 | HEIGHT: 69 IN | TEMPERATURE: 97.5 F | OXYGEN SATURATION: 96 % | WEIGHT: 180 LBS | SYSTOLIC BLOOD PRESSURE: 124 MMHG | HEART RATE: 62 BPM | DIASTOLIC BLOOD PRESSURE: 64 MMHG

## 2022-08-27 LAB
ANION GAP SERPL CALCULATED.3IONS-SCNC: 10 MMOL/L (ref 7–16)
BASOPHILS ABSOLUTE: 0.06 E9/L (ref 0–0.2)
BASOPHILS RELATIVE PERCENT: 0.6 % (ref 0–2)
BUN BLDV-MCNC: 21 MG/DL (ref 6–23)
CALCIUM SERPL-MCNC: 9.3 MG/DL (ref 8.6–10.2)
CHLORIDE BLD-SCNC: 97 MMOL/L (ref 98–107)
CO2: 28 MMOL/L (ref 22–29)
CREAT SERPL-MCNC: 1.1 MG/DL (ref 0.7–1.2)
EOSINOPHILS ABSOLUTE: 0.2 E9/L (ref 0.05–0.5)
EOSINOPHILS RELATIVE PERCENT: 2 % (ref 0–6)
GFR AFRICAN AMERICAN: >60
GFR NON-AFRICAN AMERICAN: >60 ML/MIN/1.73
GLUCOSE BLD-MCNC: 135 MG/DL (ref 74–99)
HCT VFR BLD CALC: 43.7 % (ref 37–54)
HEMOGLOBIN: 14 G/DL (ref 12.5–16.5)
IMMATURE GRANULOCYTES #: 0.36 E9/L
IMMATURE GRANULOCYTES %: 3.6 % (ref 0–5)
LYMPHOCYTES ABSOLUTE: 1.12 E9/L (ref 1.5–4)
LYMPHOCYTES RELATIVE PERCENT: 11.1 % (ref 20–42)
MCH RBC QN AUTO: 31.9 PG (ref 26–35)
MCHC RBC AUTO-ENTMCNC: 32 % (ref 32–34.5)
MCV RBC AUTO: 99.5 FL (ref 80–99.9)
MONOCYTES ABSOLUTE: 1.01 E9/L (ref 0.1–0.95)
MONOCYTES RELATIVE PERCENT: 10 % (ref 2–12)
NEUTROPHILS ABSOLUTE: 7.33 E9/L (ref 1.8–7.3)
NEUTROPHILS RELATIVE PERCENT: 72.7 % (ref 43–80)
PDW BLD-RTO: 13.7 FL (ref 11.5–15)
PLATELET # BLD: 220 E9/L (ref 130–450)
PMV BLD AUTO: 9.6 FL (ref 7–12)
POTASSIUM SERPL-SCNC: 3.8 MMOL/L (ref 3.5–5)
PROCALCITONIN: 0.49 NG/ML (ref 0–0.08)
RBC # BLD: 4.39 E12/L (ref 3.8–5.8)
SODIUM BLD-SCNC: 135 MMOL/L (ref 132–146)
WBC # BLD: 10.1 E9/L (ref 4.5–11.5)

## 2022-08-27 PROCEDURE — 6370000000 HC RX 637 (ALT 250 FOR IP): Performed by: INTERNAL MEDICINE

## 2022-08-27 PROCEDURE — 36415 COLL VENOUS BLD VENIPUNCTURE: CPT

## 2022-08-27 PROCEDURE — 99239 HOSP IP/OBS DSCHRG MGMT >30: CPT | Performed by: INTERNAL MEDICINE

## 2022-08-27 PROCEDURE — 84145 PROCALCITONIN (PCT): CPT

## 2022-08-27 PROCEDURE — 85025 COMPLETE CBC W/AUTO DIFF WBC: CPT

## 2022-08-27 PROCEDURE — 80048 BASIC METABOLIC PNL TOTAL CA: CPT

## 2022-08-27 RX ORDER — PANTOPRAZOLE SODIUM 40 MG/1
40 TABLET, DELAYED RELEASE ORAL
Qty: 30 TABLET | Refills: 3 | Status: SHIPPED | OUTPATIENT
Start: 2022-08-27

## 2022-08-27 RX ORDER — LANOLIN ALCOHOL/MO/W.PET/CERES
1 CREAM (GRAM) TOPICAL DAILY
Qty: 454 G | Refills: 0 | Status: SHIPPED | OUTPATIENT
Start: 2022-08-28

## 2022-08-27 RX ORDER — DOXYCYCLINE HYCLATE 100 MG/1
100 CAPSULE ORAL EVERY 12 HOURS SCHEDULED
Qty: 14 CAPSULE | Refills: 0 | Status: SHIPPED | OUTPATIENT
Start: 2022-08-27 | End: 2022-09-03

## 2022-08-27 RX ORDER — OXYCODONE HYDROCHLORIDE AND ACETAMINOPHEN 5; 325 MG/1; MG/1
1 TABLET ORAL EVERY 12 HOURS PRN
Qty: 10 TABLET | Refills: 0 | Status: SHIPPED | OUTPATIENT
Start: 2022-08-27 | End: 2022-09-01

## 2022-08-27 RX ORDER — AMOXICILLIN AND CLAVULANATE POTASSIUM 875; 125 MG/1; MG/1
1 TABLET, FILM COATED ORAL EVERY 12 HOURS SCHEDULED
Qty: 14 TABLET | Refills: 0 | Status: SHIPPED | OUTPATIENT
Start: 2022-08-27 | End: 2022-09-03

## 2022-08-27 RX ADMIN — APIXABAN 5 MG: 5 TABLET, FILM COATED ORAL at 09:46

## 2022-08-27 RX ADMIN — TIMOLOL MALEATE 1 DROP: 5 SOLUTION OPHTHALMIC at 09:47

## 2022-08-27 RX ADMIN — OXYCODONE AND ACETAMINOPHEN 1 TABLET: 5; 325 TABLET ORAL at 02:44

## 2022-08-27 RX ADMIN — Medication: at 09:46

## 2022-08-27 RX ADMIN — BRIMONIDINE TARTRATE 1 DROP: 2 SOLUTION OPHTHALMIC at 09:47

## 2022-08-27 RX ADMIN — PANTOPRAZOLE SODIUM 40 MG: 40 TABLET, DELAYED RELEASE ORAL at 06:32

## 2022-08-27 RX ADMIN — FUROSEMIDE 20 MG: 20 TABLET ORAL at 09:46

## 2022-08-27 RX ADMIN — DOXYCYCLINE HYCLATE 100 MG: 100 CAPSULE ORAL at 09:46

## 2022-08-27 RX ADMIN — AMOXICILLIN AND CLAVULANATE POTASSIUM 1 TABLET: 875; 125 TABLET, FILM COATED ORAL at 09:46

## 2022-08-27 RX ADMIN — RAMIPRIL 2.5 MG: 2.5 CAPSULE ORAL at 09:46

## 2022-08-27 ASSESSMENT — PAIN SCALES - GENERAL
PAINLEVEL_OUTOF10: 0
PAINLEVEL_OUTOF10: 8

## 2022-08-27 ASSESSMENT — PAIN SCALES - WONG BAKER: WONGBAKER_NUMERICALRESPONSE: 0

## 2022-08-27 NOTE — DISCHARGE INSTRUCTIONS
Your information:  Name: Cristine Quiroz  : 1934    Your instructions:    YOU ARE BEING DISCHARGED HOME. PLEASE MAKE AND KEEP ALL FOLLOW-UP APPOINTMENTS. IF YOU EXPERIENCE CHEST PAIN, SHORTNESS OF BREATH, SWEATING, LIGHTHEADEDNESS, OR PALPITATIONS: CALL 911 OR GO TO THE EMERGENCY DEPARTMENT IMMEDIATELY. If you begin to feel unwell or symptoms persist, contact your physician. What to do after you leave the hospital:    Recommended diet: cardiac diet and diabetic diet    Recommended activity: activity as tolerated        The following personal items were collected during your admission and were returned to you:    Belongings  Dental Appliances: None  Vision - Corrective Lenses: Eyeglasses  Hearing Aid: None  Clothing: Sent home  Jewelry: None  Body Piercings Removed: No  Electronic Devices: None  Weapons (Notify Protective Services/Security): None  Other Valuables: Sent home  Home Medications: None  Valuables Given To: Family (Comment) ([place](Daughter))  Provide Name(s) of Who Valuable(s) Were Given To: Urbano Holm (Daughter)  Responsible person(s) in the waiting room: N/A  Patient approves for provider to speak to responsible person post operatively: Yes    Information obtained by:  By signing below, I understand that if any problems occur once I leave the hospital I am to contact [facility]. I understand and acknowledge receipt of the instructions indicated above.

## 2022-08-27 NOTE — PROGRESS NOTES
Cardiology  Progress Note      SUBJECTIVE:  No chest pain. No dyspnea. He was sitting up in bed. He stated he is feeling much better overall.       Current Inpatient Medications  Current Facility-Administered Medications: ramipril (ALTACE) capsule 2.5 mg, 2.5 mg, Oral, Daily  furosemide (LASIX) tablet 20 mg, 20 mg, Oral, Daily  Minerin Creme CREA, , Topical, Daily  amoxicillin-clavulanate (AUGMENTIN) 875-125 MG per tablet 1 tablet, 1 tablet, Oral, 2 times per day  doxycycline hyclate (VIBRAMYCIN) capsule 100 mg, 100 mg, Oral, 2 times per day  apixaban (ELIQUIS) tablet 5 mg, 5 mg, Oral, BID  pantoprazole (PROTONIX) tablet 40 mg, 40 mg, Oral, BID AC  oxyCODONE-acetaminophen (PERCOCET) 5-325 MG per tablet 1 tablet, 1 tablet, Oral, Q4H PRN  sodium chloride flush 0.9 % injection 5-40 mL, 5-40 mL, IntraVENous, 2 times per day  sodium chloride flush 0.9 % injection 5-40 mL, 5-40 mL, IntraVENous, PRN  0.9 % sodium chloride infusion, , IntraVENous, PRN  ondansetron (ZOFRAN-ODT) disintegrating tablet 4 mg, 4 mg, Oral, Q8H PRN **OR** ondansetron (ZOFRAN) injection 4 mg, 4 mg, IntraVENous, Q6H PRN  polyethylene glycol (GLYCOLAX) packet 17 g, 17 g, Oral, Daily PRN  acetaminophen (TYLENOL) tablet 650 mg, 650 mg, Oral, Q6H PRN **OR** acetaminophen (TYLENOL) suppository 650 mg, 650 mg, Rectal, Q6H PRN  perflutren lipid microspheres (DEFINITY) injection 1.65 mg, 1.5 mL, IntraVENous, ONCE PRN  pravastatin (PRAVACHOL) tablet 40 mg, 40 mg, Oral, Nightly  brimonidine (ALPHAGAN) 0.2 % ophthalmic solution 1 drop, 1 drop, Both Eyes, Q12H **AND** timolol (TIMOPTIC) 0.5 % ophthalmic solution 1 drop, 1 drop, Both Eyes, Q12H  [Held by provider] cefTRIAXone (ROCEPHIN) 1,000 mg in sterile water 10 mL IV syringe, 1,000 mg, IntraVENous, Daily      Physical  VITALS:  BP (!) 152/68   Pulse 63   Temp 97.5 °F (36.4 °C) (Oral)   Resp 18   Ht 5' 9\" (1.753 m)   Wt 180 lb (81.6 kg)   SpO2 97%   BMI 26.58 kg/m²   CURRENT TEMPERATURE:  Temp: 97.5 °F (36.4 °C)  CONSTITUTIONAL: No acute distress. EYES: Vision is intact. ENT: No sore throat. No ear drainage. NECK: No JVD. BACK: Symmetric. LUNGS:  diminished breath sounds right base and left base  CARDIOVASCULAR:  normal S1 and S2, no S3, and murmurs include systolic murmur I/VI located at right upper sternal border without radiation  ABDOMEN:  non-distended  NEUROLOGIC: No focal deficits. EXTREMITIES: No edema cyanosis or clubbing. DATA:      ECG:  I have reviewed EKG with the following interpretation:  normal sinus rhythm    Cardiology Labs:  BMP:    Lab Results   Component Value Date/Time     08/25/2022 06:48 AM    K 4.0 08/25/2022 06:48 AM    K 3.9 03/17/2022 04:25 PM     08/25/2022 06:48 AM    CO2 27 08/25/2022 06:48 AM    BUN 22 08/25/2022 06:48 AM     CBC:    Lab Results   Component Value Date/Time    WBC 10.1 08/27/2022 09:21 AM    RBC 4.39 08/27/2022 09:21 AM    HGB 14.0 08/27/2022 09:21 AM    HCT 43.7 08/27/2022 09:21 AM    MCV 99.5 08/27/2022 09:21 AM    RDW 13.7 08/27/2022 09:21 AM     08/27/2022 09:21 AM     PT/INR:  No results found for: PTINR  TROPONIN:  No components found for: TROP    ASSESSMENT    1.elevated high-sensitivity troponin. Mostly demand ischemia from infection with type II non-STEMI. Patient had also some renal insufficiency on admission which can cause some elevation of the troponin level. No complaints of chest pain. I will hold on coronary work-up for now. 2.chronic atrial fibrillation with bradycardia. Status post permanent pacemaker. On Eliquis. PLAN  As per orders.

## 2022-08-27 NOTE — DISCHARGE SUMMARY
Physician Discharge Summary     Patient ID:  Kris Huerta  [id number]  89 y.o.  1934    Admit date: 8/21/2022    Discharge date and time: No discharge date for patient encounter. Admitting Physician: Jaswant Rowan MD     Discharge Physician: Nicky Toure    Admission Diagnoses: GIB (gastrointestinal bleeding) [K92.2]  Elevated troponin [R77.8]  CHRISTOPHER (acute kidney injury) ([address] [N17.9]  Cellulitis of right leg [R25.961]  Non-traumatic rhabdomyolysis [M62.82]  Gastrointestinal hemorrhage with hematemesis [K92.0]    Discharge Diagnoses:   Acute kidney injury due to sepsis  Sepsis due to Right lower leg cellulitis  Right lower leg cellulitis/wound and swelling  DM type 2  Hypertension essential  Hyperlipidemia  Mild Rhabdomyolysis  Elevated Troponin  Hx of atrial fibrillation    Admission Condition: poor    Discharged Condition: good    Indication for Admission:     Hospital Course:   Mr. Noel Nieves was admitted to the hospital after being found on the ground at home. He was noted to have elevated trop and CK and Creatinine. His WBC was elevated too. He was started on IV fluid. Further examination showed right leg cellulitis. He was treated with IV ceftriaxone. There was concern for nec fasc. CT of Right leg was ordered and only showed swelling. CT scan also showed calcification. Both patient and daughter declined further work up. His renal function improved. His white count normalized and procalcitonin returned to near normal range. Cardiology was consulted for elevated trop, no further work up was recommended by cardiology  Patient has hematemesis in the ER on the day of admission. He was evaluated by GI. EGD showed no source of bleeding. He was continued on eliquis without any further drop in his hgb.   His glipizide was discontinued on the day of discharge due to episodes of hypoglycemia in the hospital.  A few dose of pain medication was given at the time of discharge    Social issues: patient was told not to drive due to right leg wound. Patient refused additional ECF and expressed his desire to be discharged home. He is aware of plan to get Home health care and home PT. Time spent in discharge of patient is greater than 45 minutes    Consults: cardiology    Significant Diagnostic Studies: labs:     Treatments: IV hydration and antibiotics: vancomycin and ceftriaxone    Discharge Exam:  /64   Pulse 62   Temp 97.5 °F (36.4 °C) (Oral)   Resp 16   Ht 5' 9\" (1.753 m)   Wt 180 lb (81.6 kg)   SpO2 96%   BMI 26.58 kg/m²   General appearance: alert, appears stated age, and cooperative  Head: Normocephalic, without obvious abnormality, atraumatic  Eyes: conjunctivae/corneas clear. PERRL, EOM's intact. Fundi benign. Ears: normal TM's and external ear canals both ears  Nose: Nares normal. Septum midline. Mucosa normal. No drainage or sinus tenderness. Throat: lips, mucosa, and tongue normal; teeth and gums normal  Lungs: clear to auscultation bilaterally  Heart: regular rate and rhythm, S1, S2 normal, no murmur, click, rub or gallop  Abdomen: soft, non-tender; bowel sounds normal; no masses,  no organomegaly  Extremities:  right leg swelling  Pulses: 2+ and symmetric    Disposition: home    In process/preliminary results:  Outstanding Order Results       No orders found from 7/23/2022 to 8/22/2022. Patient Instructions:   Current Discharge Medication List        START taking these medications    Details   Skin Protectants, Misc.  (MINERIN CREME) CREA Apply 1 applicator topically daily  Qty: 454 g, Refills: 0      amoxicillin-clavulanate (AUGMENTIN) 875-125 MG per tablet Take 1 tablet by mouth every 12 hours for 14 doses  Qty: 14 tablet, Refills: 0      doxycycline hyclate (VIBRAMYCIN) 100 MG capsule Take 1 capsule by mouth every 12 hours for 14 doses  Qty: 14 capsule, Refills: 0      pantoprazole (PROTONIX) 40 MG tablet Take 1 tablet by mouth 2 times daily (before meals)  Qty: 30 tablet, Refills: 3      oxyCODONE-acetaminophen (PERCOCET) 5-325 MG per tablet Take 1 tablet by mouth every 12 hours as needed for Pain for up to 5 days. Qty: 10 tablet, Refills: 0    Comments: Reduce doses taken as pain becomes manageable  Associated Diagnoses: Cellulitis of right leg           CONTINUE these medications which have NOT CHANGED    Details   apixaban (ELIQUIS) 2.5 MG TABS tablet Take 1 tablet by mouth 2 times daily  Qty: 180 tablet, Refills: 1    Comments: Note reduction in dose  Associated Diagnoses: Paroxysmal A-fib (HCC)      pravastatin (PRAVACHOL) 40 MG tablet Take 1 tablet by mouth at bedtime Note increase in dose  Qty: 90 tablet, Refills: 1    Associated Diagnoses: Mixed hyperlipidemia      ramipril (ALTACE) 2.5 MG capsule Take 1 capsule by mouth daily  Qty: 90 capsule, Refills: 1    Associated Diagnoses: Essential hypertension      furosemide (LASIX) 20 MG tablet Take 1 tablet by mouth daily  Qty: 90 tablet, Refills: 1    Associated Diagnoses: Essential hypertension      brimonidine-timolol (COMBIGAN) 0.2-0.5 % ophthalmic solution Place 1 drop into the right eye every 12 hours. Qty: 1 Bottle, Refills: 1           STOP taking these medications       glipiZIDE (GLUCOTROL XL) 2.5 MG extended release tablet Comments:   Reason for Stopping:             Activity: activity as tolerated  Diet: regular diet  Wound Care: keep wound clean and dry and as directed    Follow-up with PCP in 2 week.     Brittany Pittsungwu  8/27/2022  1:58 PM

## 2022-09-12 ENCOUNTER — OFFICE VISIT (OUTPATIENT)
Dept: FAMILY MEDICINE CLINIC | Age: 87
End: 2022-09-12
Payer: MEDICARE

## 2022-09-12 VITALS
HEIGHT: 69 IN | OXYGEN SATURATION: 95 % | RESPIRATION RATE: 16 BRPM | SYSTOLIC BLOOD PRESSURE: 122 MMHG | HEART RATE: 65 BPM | BODY MASS INDEX: 26.58 KG/M2 | TEMPERATURE: 97.7 F | DIASTOLIC BLOOD PRESSURE: 68 MMHG

## 2022-09-12 DIAGNOSIS — Z09 HOSPITAL DISCHARGE FOLLOW-UP: Primary | ICD-10-CM

## 2022-09-12 DIAGNOSIS — N18.32 TYPE 2 DIABETES MELLITUS WITH STAGE 3B CHRONIC KIDNEY DISEASE, WITHOUT LONG-TERM CURRENT USE OF INSULIN (HCC): ICD-10-CM

## 2022-09-12 DIAGNOSIS — Z87.19 HISTORY OF GI BLEED: ICD-10-CM

## 2022-09-12 DIAGNOSIS — E11.22 TYPE 2 DIABETES MELLITUS WITH STAGE 3B CHRONIC KIDNEY DISEASE, WITHOUT LONG-TERM CURRENT USE OF INSULIN (HCC): ICD-10-CM

## 2022-09-12 DIAGNOSIS — N17.9 ACUTE KIDNEY INJURY (HCC): ICD-10-CM

## 2022-09-12 PROBLEM — N40.0 BENIGN PROSTATIC HYPERPLASIA WITHOUT LOWER URINARY TRACT SYMPTOMS: Status: ACTIVE | Noted: 2022-03-21

## 2022-09-12 PROBLEM — I73.9 PERIPHERAL VASCULAR DISEASE, UNSPECIFIED (HCC): Status: ACTIVE | Noted: 2022-03-21

## 2022-09-12 PROBLEM — I25.10 ATHEROSCLEROTIC HEART DISEASE OF NATIVE CORONARY ARTERY WITHOUT ANGINA PECTORIS: Status: ACTIVE | Noted: 2022-03-21

## 2022-09-12 PROBLEM — S80.822A BLISTER OF LEFT LEG WITHOUT INFECTION: Status: RESOLVED | Noted: 2022-06-14 | Resolved: 2022-09-12

## 2022-09-12 PROBLEM — H91.90 UNSPECIFIED HEARING LOSS, UNSPECIFIED EAR: Status: ACTIVE | Noted: 2022-03-21

## 2022-09-12 PROBLEM — S72.002A HIP FRACTURE REQUIRING OPERATIVE REPAIR, LEFT, CLOSED, INITIAL ENCOUNTER (HCC): Status: RESOLVED | Noted: 2022-03-17 | Resolved: 2022-09-12

## 2022-09-12 PROCEDURE — 1111F DSCHRG MED/CURRENT MED MERGE: CPT | Performed by: NURSE PRACTITIONER

## 2022-09-12 PROCEDURE — 3044F HG A1C LEVEL LT 7.0%: CPT | Performed by: NURSE PRACTITIONER

## 2022-09-12 PROCEDURE — 99214 OFFICE O/P EST MOD 30 MIN: CPT | Performed by: NURSE PRACTITIONER

## 2022-09-12 PROCEDURE — 1123F ACP DISCUSS/DSCN MKR DOCD: CPT | Performed by: NURSE PRACTITIONER

## 2022-09-12 ASSESSMENT — ENCOUNTER SYMPTOMS
COLOR CHANGE: 0
WHEEZING: 0
VOMITING: 0
TROUBLE SWALLOWING: 0
VOICE CHANGE: 0
SHORTNESS OF BREATH: 0
RHINORRHEA: 0
DIARRHEA: 0
BACK PAIN: 0
CONSTIPATION: 0
COUGH: 0
CHEST TIGHTNESS: 0
SINUS PRESSURE: 0
NAUSEA: 0
ABDOMINAL PAIN: 0
FACIAL SWELLING: 0
SORE THROAT: 0
SINUS PAIN: 0

## 2022-09-12 NOTE — PROGRESS NOTES
Post-Discharge Transitional Care Follow Up      Ron Zavala   YOB: 1934    Date of Office Visit:  9/12/2022  Date of Hospital Admission: 8/21/22  Date of Hospital Discharge: 8/27/22  Readmission Risk Score (high >=14%. Medium >=10%):Readmission Risk Score: 10.4      Care management risk score Rising risk (score 2-5) and Complex Care (Scores >=6): No Risk Score On File     Non face to face  following discharge, date last encounter closed (first attempt may have been earlier): *No documented post hospital discharge outreach found in the last 14 days     Call initiated 2 business days of discharge: *No response recorded in the last 14 days     Hospital discharge follow-up  -     CA DISCHARGE MEDS RECONCILED W/ CURRENT OUTPATIENT MED LIST  History of GI bleed  Assessment & Plan:   EGD done during admission and no sign of GI bleed. Labs stable. Acute kidney injury [facility])  Assessment & Plan:   Reviewed labs and has resolved. Continue current medications. Type 2 diabetes mellitus with stage 3b chronic kidney disease, without long-term current use of insulin ([facility] [address]  Assessment & Plan:   Well controlled A1c 6.7% hospital discontinued glipizide due to hypoglycemia while inpatient, will hold off on medications today but to watch diet, limit alcohol intake and if A1c worsens at next visit will resume medication. Medical Decision Making: moderate complexity  Return in about 3 months (around 12/12/2022) for DM, HTN, hyperlipidemia  make medicare well visit  next year 8/2/2023 . On this date 9/12/2022 I have spent 40 minutes reviewing previous notes, test results and face to face with the patient discussing the diagnosis and importance of compliance with the treatment plan as well as documenting on the day of the visit.        Subjective:   HPI  From discharge notes Mr. Dru Marlow was admitted to the hospital after being found on the steps at home he says he was taking the shotgun out to clean it and he fell down the steps and landed on his buttocks. Chiquita Riley He was noted to have elevated trop and CK and Creatinine. His WBC was elevated too. He was started on IV fluid. Further examination showed right leg cellulitis. He was treated with IV ceftriaxone. There was concern for nec fasc. CT of Right leg was ordered and only showed swelling. CT scan also showed calcification. Both patient and daughter declined further work up. His renal function improved. His white count normalized and procalcitonin returned to near normal range. Cardiology was consulted for elevated trop, no further work up was recommended by cardiology  Patient has hematemesis in the ER on the day of admission. He says he bit his lip and thinks this is why he had the bleeding. He was evaluated by GI. EGD showed no source of bleeding. He was continued on eliquis without any further drop in his hgb. His glipizide was discontinued on the day of discharge due to episodes of hypoglycemia in the hospital. His A1c 6.7% from the hospital  A few dose of pain medication was given at the time of discharge    [facility] was there today and changed the dressing on the right leg. He is feeling well today. Labs improved and renal function back to normal, H/H stable no evidence of GI bleed. .     Labs reviewed from hospital stay    Inpatient course: Discharge summary reviewed- see chart.     Interval history/Current status: stable    Patient Active Problem List   Diagnosis    Essential hypertension    Inflammatory arthritis    Paroxysmal A-fib ([address]    Mixed hyperlipidemia    Pacemaker    Type 2 diabetes mellitus with stage 3b chronic kidney disease, without long-term current use of insulin ([address]    Ichthyosis    Stage 3 chronic kidney disease (HCC)    Bilateral leg edema    Blister of right lower leg without infection    Ear lesion    Vaccination declined by patient    GIB (gastrointestinal bleeding)    Peripheral vascular disease, unspecified ([address]    Benign prostatic hyperplasia without lower urinary tract symptoms    Atherosclerotic heart disease of native coronary artery without angina pectoris    Unspecified hearing loss, unspecified ear    History of GI bleed    Acute kidney injury ([facility] [address]       Medications listed as ordered at the time of discharge from hospital     Medication List            Accurate as of September 12, 2022  1:58 PM. If you have any questions, ask your nurse or doctor. CONTINUE taking these medications      apixaban 2.5 MG Tabs tablet  Commonly known as: Eliquis  Take 1 tablet by mouth 2 times daily     brimonidine-timolol 0.2-0.5 % ophthalmic solution  Commonly known as: Combigan  Place 1 drop into the right eye every 12 hours. furosemide 20 MG tablet  Commonly known as: Lasix  Take 1 tablet by mouth daily     Minerin Creme Crea  Apply 1 applicator topically daily     pantoprazole 40 MG tablet  Commonly known as: PROTONIX  Take 1 tablet by mouth 2 times daily (before meals)     pravastatin 40 MG tablet  Commonly known as: PRAVACHOL  Take 1 tablet by mouth at bedtime Note increase in dose     ramipril 2.5 MG capsule  Commonly known as: ALTACE  Take 1 capsule by mouth daily               Medications marked \"taking\" at this time  Outpatient Medications Marked as Taking for the 9/12/22 encounter (Office Visit) with LAURENCE Carr CNP   Medication Sig Dispense Refill    Skin Protectants, Misc.  (MINERIN CREME) CREA Apply 1 applicator topically daily 454 g 0    pantoprazole (PROTONIX) 40 MG tablet Take 1 tablet by mouth 2 times daily (before meals) 30 tablet 3    apixaban (ELIQUIS) 2.5 MG TABS tablet Take 1 tablet by mouth 2 times daily 180 tablet 1    pravastatin (PRAVACHOL) 40 MG tablet Take 1 tablet by mouth at bedtime Note increase in dose 90 tablet 1    ramipril (ALTACE) 2.5 MG capsule Take 1 capsule by mouth daily 90 capsule 1    furosemide (LASIX) 20 MG tablet Take 1 tablet by mouth daily 90 tablet 1    brimonidine-timolol (COMBIGAN) 0.2-0.5 % ophthalmic solution Place 1 drop into the right eye every 12 hours. 1 Bottle 1        Medications patient taking as of now reconciled against medications ordered at time of hospital discharge: Yes    Review of Systems   Constitutional:  Negative for activity change, appetite change, chills, diaphoresis, fatigue, fever and unexpected weight change. HENT:  Negative for congestion, dental problem, drooling, ear discharge, ear pain, facial swelling, hearing loss, mouth sores, nosebleeds, postnasal drip, rhinorrhea, sinus pressure, sinus pain, sneezing, sore throat, tinnitus, trouble swallowing and voice change. Eyes:  Negative for visual disturbance. Respiratory:  Negative for cough, chest tightness, shortness of breath and wheezing. Cardiovascular:  Negative for chest pain, palpitations and leg swelling. Gastrointestinal:  Negative for abdominal pain, constipation, diarrhea, nausea and vomiting. Endocrine: Negative for cold intolerance, heat intolerance, polydipsia, polyphagia and polyuria. Genitourinary:  Negative for difficulty urinating, frequency and urgency. Musculoskeletal:  Positive for gait problem. Negative for arthralgias, back pain, joint swelling, myalgias, neck pain and neck stiffness. Skin:  Negative for color change, pallor, rash and wound. Wound right lower leg/foot dressing dry and intact. Allergic/Immunologic: Negative for environmental allergies, food allergies and immunocompromised state. Neurological:  Negative for dizziness, tremors, seizures, syncope, facial asymmetry, speech difficulty, weakness, light-headedness, numbness and headaches. Hematological:  Negative for adenopathy. Does not bruise/bleed easily. Psychiatric/Behavioral:  Negative for agitation, behavioral problems, confusion, decreased concentration, dysphoric mood, hallucinations, self-injury, sleep disturbance and suicidal ideas. The patient is not nervous/anxious and is not hyperactive. Objective:    /68   Pulse 65   Temp 97.7 °F (36.5 °C)   Resp 16   Ht 5' 9\" (1.753 m)   SpO2 95%   BMI 26.58 kg/m²   Physical Exam  Vitals and nursing note reviewed. Constitutional:       Appearance: Normal appearance. He is normal weight. HENT:      Head: Normocephalic and atraumatic. Cardiovascular:      Rate and Rhythm: Normal rate and regular rhythm. Pulses: Normal pulses. Heart sounds: Normal heart sounds. Pulmonary:      Effort: Pulmonary effort is normal.      Breath sounds: Normal breath sounds. Abdominal:      General: Abdomen is flat. Bowel sounds are normal.      Palpations: Abdomen is soft. Skin:     General: Skin is warm and dry. Capillary Refill: Capillary refill takes 2 to 3 seconds. Comments: Dressing on right lower leg and foot dry and intact,    Neurological:      General: No focal deficit present. Mental Status: He is alert and oriented to person, place, and time. Mental status is at baseline. Psychiatric:         Mood and Affect: Mood normal.         Behavior: Behavior normal.         Thought Content: Thought content normal.         Judgment: Judgment normal.       An electronic signature was used to authenticate this note.   --Jessica Watson, LAURENCE - CNP

## 2022-09-12 NOTE — ASSESSMENT & PLAN NOTE
Well controlled A1c 6.7% hospital discontinued glipizide due to hypoglycemia while inpatient, will hold off on medications today but to watch diet, limit alcohol intake and if A1c worsens at next visit will resume medication.

## 2022-11-21 ENCOUNTER — TELEPHONE (OUTPATIENT)
Dept: FAMILY MEDICINE CLINIC | Age: 87
End: 2022-11-21

## 2022-11-21 NOTE — TELEPHONE ENCOUNTER
I rec'd a call on the Nurse Triage Line informing patient is c/o swelling of Rt leg, abscesses, drainage with smell. The call was warm transferred and patient informed he's been having a home health nurse come out twice weekly, the last visit was last Thursday. Patient stated no one came out today. Patient c/o Rt leg swelling, areas with scabs that have opened and are draining with some yellowish and dark brown discharge with smell. I informed patient that Johnny New is gone for the day and advised patient that he should go to the ED. Patient verbalized understanding and was agreeable. Msg routed, for informational purposes.      Last seen 9/12/2022  Next appt 12/14/2022

## 2022-11-22 ENCOUNTER — HOSPITAL ENCOUNTER (EMERGENCY)
Age: 87
Discharge: HOME | End: 2022-11-22
Attending: EMERGENCY MEDICINE
Payer: MEDICARE

## 2022-11-22 VITALS
RESPIRATION RATE: 20 BRPM | WEIGHT: 185 LBS | OXYGEN SATURATION: 98 % | DIASTOLIC BLOOD PRESSURE: 67 MMHG | BODY MASS INDEX: 27.32 KG/M2 | SYSTOLIC BLOOD PRESSURE: 151 MMHG | TEMPERATURE: 97.3 F | HEART RATE: 61 BPM

## 2022-11-22 DIAGNOSIS — L03.115 CELLULITIS OF RIGHT LOWER EXTREMITY: Primary | ICD-10-CM

## 2022-11-22 LAB
ALBUMIN SERPL-MCNC: 3.8 G/DL (ref 3.5–5.2)
ALP BLD-CCNC: 92 U/L (ref 40–129)
ALT SERPL-CCNC: 6 U/L (ref 0–40)
ANION GAP SERPL CALCULATED.3IONS-SCNC: 9 MMOL/L (ref 7–16)
AST SERPL-CCNC: 15 U/L (ref 0–39)
BASOPHILS ABSOLUTE: 0.09 E9/L (ref 0–0.2)
BASOPHILS RELATIVE PERCENT: 1 % (ref 0–2)
BILIRUB SERPL-MCNC: 0.4 MG/DL (ref 0–1.2)
BUN BLDV-MCNC: 31 MG/DL (ref 6–23)
CALCIUM SERPL-MCNC: 9.3 MG/DL (ref 8.6–10.2)
CHLORIDE BLD-SCNC: 105 MMOL/L (ref 98–107)
CO2: 27 MMOL/L (ref 22–29)
CREAT SERPL-MCNC: 1.3 MG/DL (ref 0.7–1.2)
EOSINOPHILS ABSOLUTE: 0.35 E9/L (ref 0.05–0.5)
EOSINOPHILS RELATIVE PERCENT: 3.9 % (ref 0–6)
GFR SERPL CREATININE-BSD FRML MDRD: 53 ML/MIN/1.73
GLUCOSE BLD-MCNC: 151 MG/DL (ref 74–99)
HCT VFR BLD CALC: 38.7 % (ref 37–54)
HEMOGLOBIN: 12.4 G/DL (ref 12.5–16.5)
IMMATURE GRANULOCYTES #: 0.06 E9/L
IMMATURE GRANULOCYTES %: 0.7 % (ref 0–5)
LYMPHOCYTES ABSOLUTE: 0.95 E9/L (ref 1.5–4)
LYMPHOCYTES RELATIVE PERCENT: 10.6 % (ref 20–42)
MCH RBC QN AUTO: 32 PG (ref 26–35)
MCHC RBC AUTO-ENTMCNC: 32 % (ref 32–34.5)
MCV RBC AUTO: 100 FL (ref 80–99.9)
MONOCYTES ABSOLUTE: 0.84 E9/L (ref 0.1–0.95)
MONOCYTES RELATIVE PERCENT: 9.4 % (ref 2–12)
NEUTROPHILS ABSOLUTE: 6.68 E9/L (ref 1.8–7.3)
NEUTROPHILS RELATIVE PERCENT: 74.4 % (ref 43–80)
PDW BLD-RTO: 14.3 FL (ref 11.5–15)
PLATELET # BLD: 242 E9/L (ref 130–450)
PMV BLD AUTO: 10.5 FL (ref 7–12)
POTASSIUM SERPL-SCNC: 4.5 MMOL/L (ref 3.5–5)
RBC # BLD: 3.87 E12/L (ref 3.8–5.8)
SODIUM BLD-SCNC: 141 MMOL/L (ref 132–146)
TOTAL PROTEIN: 6.5 G/DL (ref 6.4–8.3)
WBC # BLD: 9 E9/L (ref 4.5–11.5)

## 2022-11-22 PROCEDURE — 80053 COMPREHEN METABOLIC PANEL: CPT

## 2022-11-22 PROCEDURE — 6370000000 HC RX 637 (ALT 250 FOR IP): Performed by: NURSE PRACTITIONER

## 2022-11-22 PROCEDURE — 99283 EMERGENCY DEPT VISIT LOW MDM: CPT

## 2022-11-22 PROCEDURE — 36415 COLL VENOUS BLD VENIPUNCTURE: CPT

## 2022-11-22 PROCEDURE — 85025 COMPLETE CBC W/AUTO DIFF WBC: CPT

## 2022-11-22 RX ORDER — BACITRACIN ZINC 500 [USP'U]/G
OINTMENT TOPICAL ONCE
Status: COMPLETED | OUTPATIENT
Start: 2022-11-22 | End: 2022-11-22

## 2022-11-22 RX ORDER — CEPHALEXIN 500 MG/1
500 CAPSULE ORAL 4 TIMES DAILY
Qty: 21 CAPSULE | Refills: 0 | Status: SHIPPED | OUTPATIENT
Start: 2022-11-22 | End: 2022-11-29

## 2022-11-22 RX ADMIN — BACITRACIN: 500 OINTMENT TOPICAL at 11:05

## 2022-11-22 NOTE — ED PROVIDER NOTES
ED Attending shared visit  CC: No    HPI:  11/22/22,   Time: 11:33 AM REJI Morin is a 80 y.o. male presenting to the ED for wound check to the right lower extremity, beginning several months ago. The complaint has been persistent, mild in severity, and worsened by nothing. Patient presents stating that he was advised to come to the emergency room for evaluation by the home care nurse who was at his residence earlier today for dressing change. States that she was concerned that the wound is just not healing. He states the wound is not getting any worse it is not getting any better either. He has not been on any antibiotics recently. He denies any recent falls or injuries. He denies any fevers. He is diabetic. States that he just needs antibiotics and does not want to be admitted to the hospital.    ROS:   Pertinent positives and negatives are stated within HPI, all other systems reviewed and are negative.  --------------------------------------------- PAST HISTORY ---------------------------------------------  Past Medical History:  has a past medical history of A-fib ([address] Anticoagulant long-term use, Atrial fibrillation ([address] Blister of left leg without infection, BPH (benign prostatic hyperplasia), CAD (coronary artery disease), Cataract of left eye, Cerebrovascular disease, CHF (congestive heart failure) ([address] Diabetes mellitus ([address] Dislocated IOL (intraocular lens), posterior, Hearing loss, Hip fracture requiring operative repair, left, closed, initial encounter ([address] Hyperlipidemia, Hypertension, Osteoarthritis, Peripheral vascular disease ([address] Renal cyst, Right cataract, Type 2 diabetes mellitus with stage 3b chronic kidney disease, without long-term current use of insulin ([address] Type 2 diabetes mellitus without complication ([address] and Unspecified cerebral artery occlusion with cerebral infarction.     Past Surgical History:  has a past surgical history that includes joint replacement; Cataract removal with implant (Right, 11 12 2013); eye surgery (Right, 11/15/13); Cataract removal with implant (Left, 03/11/13); and Upper gastrointestinal endoscopy (N/A, 8/23/2022). Social History:  reports that he quit smoking about 24 years ago. His smoking use included cigarettes. He has a 30.00 pack-year smoking history. He quit smokeless tobacco use about 6 years ago. He reports current alcohol use. He reports that he does not use drugs. Family History: family history includes Asthma in his mother; High Blood Pressure in his mother; Stroke in his mother. The patients home medications have been reviewed.     Allergies: Lipitor [atorvastatin calcium] and Vytorin [ezetimibe-simvastatin]    -------------------------------------------------- RESULTS -------------------------------------------------  All laboratory and radiology results have been personally reviewed by myself   LABS:  Results for orders placed or performed during the hospital encounter of 11/22/22   CBC with Auto Differential   Result Value Ref Range    WBC 9.0 4.5 - 11.5 E9/L    RBC 3.87 3.80 - 5.80 E12/L    Hemoglobin 12.4 (L) 12.5 - 16.5 g/dL    Hematocrit 38.7 37.0 - 54.0 %    .0 (H) 80.0 - 99.9 fL    MCH 32.0 26.0 - 35.0 pg    MCHC 32.0 32.0 - 34.5 %    RDW 14.3 11.5 - 15.0 fL    Platelets 179 014 - 547 E9/L    MPV 10.5 7.0 - 12.0 fL    Neutrophils % 74.4 43.0 - 80.0 %    Immature Granulocytes % 0.7 0.0 - 5.0 %    Lymphocytes % 10.6 (L) 20.0 - 42.0 %    Monocytes % 9.4 2.0 - 12.0 %    Eosinophils % 3.9 0.0 - 6.0 %    Basophils % 1.0 0.0 - 2.0 %    Neutrophils Absolute 6.68 1.80 - 7.30 E9/L    Immature Granulocytes # 0.06 E9/L    Lymphocytes Absolute 0.95 (L) 1.50 - 4.00 E9/L    Monocytes Absolute 0.84 0.10 - 0.95 E9/L    Eosinophils Absolute 0.35 0.05 - 0.50 E9/L    Basophils Absolute 0.09 0.00 - 0.20 E9/L   Comprehensive Metabolic Panel   Result Value Ref Range    Sodium 141 132 - 146 mmol/L    Potassium 4.5 3.5 - 5.0 mmol/L    Chloride 105 98 - 107 mmol/L    CO2 27 22 - 29 mmol/L    Anion Gap 9 7 - 16 mmol/L    Glucose 151 (H) 74 - 99 mg/dL    BUN 31 (H) 6 - 23 mg/dL    Creatinine 1.3 (H) 0.7 - 1.2 mg/dL    Est, Glom Filt Rate 53 >=60 mL/min/1.73    Calcium 9.3 8.6 - 10.2 mg/dL    Total Protein 6.5 6.4 - 8.3 g/dL    Albumin 3.8 3.5 - 5.2 g/dL    Total Bilirubin 0.4 0.0 - 1.2 mg/dL    Alkaline Phosphatase 92 40 - 129 U/L    ALT 6 0 - 40 U/L    AST 15 0 - 39 U/L       RADIOLOGY:  Interpreted by Radiologist.  No orders to display       ------------------------- NURSING NOTES AND VITALS REVIEWED ---------------------------   The nursing notes within the ED encounter and vital signs as below have been reviewed. BP (!) 151/67   Pulse 61   Temp 97.3 °F (36.3 °C) (Infrared)   Resp 20   Wt 185 lb (83.9 kg)   SpO2 98%   BMI 27.32 kg/m²   Oxygen Saturation Interpretation: Normal      ---------------------------------------------------PHYSICAL EXAM--------------------------------------      Constitutional/General: Alert and oriented x3, well appearing, non toxic in NAD  Head: NC/AT  Eyes: PERRL, EOMI  Mouth: Oropharynx clear, handling secretions, no trismus  Neck: Supple, full ROM, no meningeal signs  Pulmonary: Lungs clear to auscultation bilaterally, no wheezes, rales, or rhonchi. Not in respiratory distress  Cardiovascular:  Regular rate and rhythm, no murmurs, gallops, or rubs. 2+ distal pulses  Abdomen: Soft, non tender, non distended,   Extremities: Moves all extremities x 4. Warm and well perfused, has some mild edema noted to the right lower extremity there are 2 approximately 1 cm diameter circular ulcerations to the anterior of the distal right tib-fib as well as to the dorsum of the right foot. There is mild surrounding erythema. No visible drainage is noted. Unable to palpate pedal or PT pulse however was obtained by Doppler.   No calf tenderness on exam.  His decree sensation to the toes which she states is normal.  Skin: warm and dry without rash  Neurologic: GCS 15,  Psych: Normal Affect      ------------------------------ ED COURSE/MEDICAL DECISION MAKING----------------------  Medications   bacitracin zinc ointment ( Topical Given 11/22/22 6328)         Medical Decision Making:    Patient was examined by Dr. Kaur Lee. He presented with a wound check to the right lower extremity secondary to a nonhealing diabetic ulcer. There is no purulent drainage noted. He denies any fevers. No evidence of leukocytosis on laboratory evaluation. Plan will be for antibiotics and advised to follow-up with primary care physician to continue wound care as currently being followed by home care. If any change or worsening symptoms to return back to the emergency room otherwise follow-up with PCP. Counseling: The emergency provider has spoken with the patient and discussed todays results, in addition to providing specific details for the plan of care and counseling regarding the diagnosis and prognosis. Questions are answered at this time and they are agreeable with the plan.      --------------------------------- IMPRESSION AND DISPOSITION ---------------------------------    IMPRESSION  1.  Cellulitis of right lower extremity        DISPOSITION  Disposition: Discharge to home  Patient condition is good                  LAURENCE [place] CNP  11/22/22 8297

## 2022-11-22 NOTE — ED NOTES
Bacitracin applied to wound on right leg/foot and clean dry dressing and cling applied.      Telma Covington RN  11/22/22 0609 done

## 2023-05-05 ENCOUNTER — HOSPITAL ENCOUNTER (OUTPATIENT)
Dept: WOUND CARE | Age: 88
Discharge: HOME | End: 2023-05-05
Payer: MEDICARE

## 2023-05-05 VITALS
HEIGHT: 69 IN | TEMPERATURE: 97.4 F | WEIGHT: 182 LBS | BODY MASS INDEX: 26.96 KG/M2 | SYSTOLIC BLOOD PRESSURE: 132 MMHG | RESPIRATION RATE: 18 BRPM | DIASTOLIC BLOOD PRESSURE: 70 MMHG | HEART RATE: 72 BPM

## 2023-05-05 DIAGNOSIS — I73.9 PERIPHERAL VASCULAR DISEASE, UNSPECIFIED (HCC): Primary | ICD-10-CM

## 2023-05-05 DIAGNOSIS — I87.333 CHRONIC VENOUS HTN W ULCER AND INFLAM OF BILATERAL LOW EXTRM (HCC): ICD-10-CM

## 2023-05-05 PROCEDURE — 99213 OFFICE O/P EST LOW 20 MIN: CPT

## 2023-05-05 PROCEDURE — 99204 OFFICE O/P NEW MOD 45 MIN: CPT | Performed by: FAMILY MEDICINE

## 2023-05-05 RX ORDER — LIDOCAINE 40 MG/G
CREAM TOPICAL ONCE
OUTPATIENT
Start: 2023-05-05 | End: 2023-05-05

## 2023-05-05 RX ORDER — BACITRACIN ZINC AND POLYMYXIN B SULFATE 500; 1000 [USP'U]/G; [USP'U]/G
OINTMENT TOPICAL ONCE
OUTPATIENT
Start: 2023-05-05 | End: 2023-05-05

## 2023-05-05 RX ORDER — CLOBETASOL PROPIONATE 0.5 MG/G
OINTMENT TOPICAL ONCE
OUTPATIENT
Start: 2023-05-05 | End: 2023-05-05

## 2023-05-05 RX ORDER — GINSENG 100 MG
CAPSULE ORAL ONCE
OUTPATIENT
Start: 2023-05-05 | End: 2023-05-05

## 2023-05-05 RX ORDER — BETAMETHASONE DIPROPIONATE 0.05 %
OINTMENT (GRAM) TOPICAL ONCE
OUTPATIENT
Start: 2023-05-05 | End: 2023-05-05

## 2023-05-05 RX ORDER — LIDOCAINE HYDROCHLORIDE 20 MG/ML
JELLY TOPICAL ONCE
OUTPATIENT
Start: 2023-05-05 | End: 2023-05-05

## 2023-05-05 RX ORDER — BACITRACIN, NEOMYCIN, POLYMYXIN B 400; 3.5; 5 [USP'U]/G; MG/G; [USP'U]/G
OINTMENT TOPICAL ONCE
OUTPATIENT
Start: 2023-05-05 | End: 2023-05-05

## 2023-05-05 RX ORDER — GENTAMICIN SULFATE 1 MG/G
OINTMENT TOPICAL ONCE
OUTPATIENT
Start: 2023-05-05 | End: 2023-05-05

## 2023-05-05 RX ORDER — LIDOCAINE 50 MG/G
OINTMENT TOPICAL ONCE
OUTPATIENT
Start: 2023-05-05 | End: 2023-05-05

## 2023-05-05 RX ORDER — LIDOCAINE HYDROCHLORIDE 40 MG/ML
SOLUTION TOPICAL ONCE
OUTPATIENT
Start: 2023-05-05 | End: 2023-05-05

## 2023-05-05 ASSESSMENT — ENCOUNTER SYMPTOMS
CONSTIPATION: 0
WHEEZING: 0
BLOOD IN STOOL: 0
DIARRHEA: 0

## 2023-05-05 NOTE — PLAN OF CARE
Problem: Chronic Conditions and Co-morbidities  Goal: Patient's chronic conditions and co-morbidity symptoms are monitored and maintained or improved  Outcome: Adequate for Discharge     Problem: Wound:  Goal: Will show signs of wound healing; wound closure and no evidence of infection  Description: Will show signs of wound healing; wound closure and no evidence of infection  Outcome: Adequate for Discharge     Problem: Falls - Risk of:  Goal: Will remain free from falls  Description: Will remain free from falls  Outcome: Adequate for Discharge

## 2023-05-05 NOTE — DISCHARGE INSTRUCTIONS
Visit Discharge/Physician Orders    Discharge condition: Stable    Assessment of pain at discharge: none    Anesthetic used: 4% licocaine topically    Discharge to: Home    Left via:Private automobile    Accompanied by: son in law    ECF/HHA: [address]    Dressing Orders: Cleanse with normal saline, apply alginate to open areas, ABD pads and wrap with Kerlix. Change daily. Treatment Orders:  schedule ALEM testing to lower legs. [facility] followup visit _____________________2 weeks Dr. Kamala Low ________  (Please note your next appointment above and if you are unable to keep, kindly give a 24 hour notice. Thank you.)    Physician signature:__________________________      If you experience any of the following, please call the [facility] during business hours:    * Increase in Pain  * Temperature over 101  * Increase in drainage from your wound  * Drainage with a foul odor  * Bleeding  * Increase in swelling  * Need for compression bandage changes due to slippage, breakthrough drainage. If you need medical attention outside of the business hours of the [facility] please contact your PCP or go to the nearest emergency room.

## 2023-05-05 NOTE — PROGRESS NOTES
admission. Social History:    reports that he quit smoking about 25 years ago. His smoking use included cigars and cigarettes. He has a 30.00 pack-year smoking history. He quit smokeless tobacco use about 7 years ago. His smokeless tobacco use included chew. He reports current alcohol use. He reports that he does not use drugs. Family History:   family history includes Asthma in his mother; High Blood Pressure in his mother; Stroke in his mother. REVIEW OF SYSTEMS:  Review of Systems   Constitutional:  Negative for chills and diaphoresis. HENT:  Negative for ear discharge, ear pain, hearing loss, nosebleeds and tinnitus. Respiratory:  Negative for wheezing. Cardiovascular:  Negative for chest pain. Gastrointestinal:  Negative for blood in stool, constipation and diarrhea. Genitourinary:  Negative for dysuria, flank pain and hematuria. Skin:  Negative for rash. Neurological:  Negative for headaches. Hematological:  Does not bruise/bleed easily. PHYSICAL EXAM:    Vitals:  Vitals:    05/05/23 0945   BP: 132/70   Pulse: 72   Resp: 18   Temp: 97.4 °F (36.3 °C)       General:  Awake, alert, oriented X 3. Well developed, well nourished, 80 y.o. male. No apparent distress. HEENT:  Normocephalic, atraumatic. Pupils equal, round, reactive to light. No scleral icterus. No conjunctival injection. Normal lips, teeth, and gums. No nasal discharge. Neck:  Supple  Lungs:  CTA bilaterally, bilat symmetrical expansion, no wheeze, rales, or rhonchi  Heart:  RRR, no murmurs, gallops, rubs  Abdomen: Bowel sounds present, soft, nontender, no masses, no organomegaly, no peritoneal signs  Extremities:  3+ edema   Skin:  Warm and dry, satisfactory turgor   Neuro:  Cranial nerves 2-12 intact, no focal deficits.     Wound Metric Flow:   /70   Pulse 72   Temp 97.4 °F (36.3 °C) (Temporal)   Resp 18   Ht 5' 9\" (1.753 m)   Wt 182 lb (82.6 kg)   BMI 26.88 kg/m²   Wound serous exudate

## 2023-05-16 NOTE — DISCHARGE INSTRUCTIONS
Discharge condition: Stable     Assessment of pain at discharge: none     Anesthetic used: 4% licocaine topically     Discharge to: Home     Left via:Private automobile     Accompanied by: son in law     ECF/HHA: [address]     Dressing Orders:  Left and right lower legs Cleanse with normal saline, apply alginate to open areas, ABD pads and wrap with Kerlix. Change daily. Left buttock -  Apply skin barrier cream like Desitin  or  Pinxav (zinc) daily. Treatment Orders:   Weigh daily every morning. Take 2 Lasix if weight is 3 pounds more than the day before. [address] [place] [address],[address] followup visit _____________________2 weeks Dr. Ben Orozco ________  (Please note your next appointment above and if you are unable to keep, kindly give a 24 hour notice. Thank you.)     Physician signature:__________________________        If you experience any of the following, please call the [facility] during business hours:     * Increase in Pain  * Temperature over 101  * Increase in drainage from your wound  * Drainage with a foul odor  * Bleeding  * Increase in swelling  * Need for compression bandage changes due to slippage, breakthrough drainage. If you need medical attention outside of the business hours of the [facility] please contact your PCP or go to the nearest emergency room.

## 2023-05-19 ENCOUNTER — HOSPITAL ENCOUNTER (OUTPATIENT)
Dept: WOUND CARE | Age: 88
Discharge: HOME | End: 2023-05-19
Payer: MEDICARE

## 2023-05-19 VITALS
WEIGHT: 182 LBS | BODY MASS INDEX: 26.96 KG/M2 | SYSTOLIC BLOOD PRESSURE: 133 MMHG | HEIGHT: 69 IN | DIASTOLIC BLOOD PRESSURE: 63 MMHG | RESPIRATION RATE: 18 BRPM | TEMPERATURE: 97.6 F | HEART RATE: 62 BPM

## 2023-05-19 DIAGNOSIS — S80.821S: ICD-10-CM

## 2023-05-19 DIAGNOSIS — I87.333 CHRONIC VENOUS HTN W ULCER AND INFLAM OF BILATERAL LOW EXTRM (HCC): ICD-10-CM

## 2023-05-19 DIAGNOSIS — I73.9 PERIPHERAL VASCULAR DISEASE, UNSPECIFIED (HCC): Primary | ICD-10-CM

## 2023-05-19 PROCEDURE — 99213 OFFICE O/P EST LOW 20 MIN: CPT

## 2023-05-19 PROCEDURE — 99213 OFFICE O/P EST LOW 20 MIN: CPT | Performed by: FAMILY MEDICINE

## 2023-05-19 RX ORDER — LIDOCAINE 50 MG/G
OINTMENT TOPICAL ONCE
OUTPATIENT
Start: 2023-05-19 | End: 2023-05-19

## 2023-05-19 RX ORDER — LIDOCAINE HYDROCHLORIDE 20 MG/ML
JELLY TOPICAL ONCE
OUTPATIENT
Start: 2023-05-19 | End: 2023-05-19

## 2023-05-19 RX ORDER — LIDOCAINE HYDROCHLORIDE 40 MG/ML
SOLUTION TOPICAL ONCE
Status: DISCONTINUED | OUTPATIENT
Start: 2023-05-19 | End: 2023-05-20 | Stop reason: HOSPADM

## 2023-05-19 RX ORDER — BACITRACIN ZINC AND POLYMYXIN B SULFATE 500; 1000 [USP'U]/G; [USP'U]/G
OINTMENT TOPICAL ONCE
OUTPATIENT
Start: 2023-05-19 | End: 2023-05-19

## 2023-05-19 RX ORDER — GINSENG 100 MG
CAPSULE ORAL ONCE
OUTPATIENT
Start: 2023-05-19 | End: 2023-05-19

## 2023-05-19 RX ORDER — LIDOCAINE 40 MG/G
CREAM TOPICAL ONCE
OUTPATIENT
Start: 2023-05-19 | End: 2023-05-19

## 2023-05-19 RX ORDER — GENTAMICIN SULFATE 1 MG/G
OINTMENT TOPICAL ONCE
OUTPATIENT
Start: 2023-05-19 | End: 2023-05-19

## 2023-05-19 RX ORDER — BACITRACIN, NEOMYCIN, POLYMYXIN B 400; 3.5; 5 [USP'U]/G; MG/G; [USP'U]/G
OINTMENT TOPICAL ONCE
OUTPATIENT
Start: 2023-05-19 | End: 2023-05-19

## 2023-05-19 RX ORDER — BETAMETHASONE DIPROPIONATE 0.05 %
OINTMENT (GRAM) TOPICAL ONCE
OUTPATIENT
Start: 2023-05-19 | End: 2023-05-19

## 2023-05-19 RX ORDER — LIDOCAINE HYDROCHLORIDE 40 MG/ML
SOLUTION TOPICAL ONCE
OUTPATIENT
Start: 2023-05-19 | End: 2023-05-19

## 2023-05-19 RX ORDER — CLOBETASOL PROPIONATE 0.5 MG/G
OINTMENT TOPICAL ONCE
OUTPATIENT
Start: 2023-05-19 | End: 2023-05-19

## 2023-05-19 ASSESSMENT — PAIN DESCRIPTION - LOCATION: LOCATION: BUTTOCKS

## 2023-05-19 ASSESSMENT — PAIN - FUNCTIONAL ASSESSMENT: PAIN_FUNCTIONAL_ASSESSMENT: ACTIVITIES ARE NOT PREVENTED

## 2023-05-19 ASSESSMENT — PAIN DESCRIPTION - DESCRIPTORS: DESCRIPTORS: STABBING;BURNING

## 2023-05-19 ASSESSMENT — PAIN DESCRIPTION - FREQUENCY: FREQUENCY: INTERMITTENT

## 2023-05-19 ASSESSMENT — PAIN DESCRIPTION - ONSET: ONSET: ON-GOING

## 2023-05-19 ASSESSMENT — PAIN SCALES - GENERAL: PAINLEVEL_OUTOF10: 5

## 2023-05-19 ASSESSMENT — PAIN DESCRIPTION - PAIN TYPE: TYPE: CHRONIC PAIN

## 2023-05-19 NOTE — PLAN OF CARE
Problem: Chronic Conditions and Co-morbidities  Goal: Patient's chronic conditions and co-morbidity symptoms are monitored and maintained or improved  Outcome: Progressing     Problem: Chronic Conditions and Co-morbidities  Goal: Patient's chronic conditions and co-morbidity symptoms are monitored and maintained or improved  Outcome: Progressing     Problem: Wound:  Goal: Will show signs of wound healing; wound closure and no evidence of infection  Description: Will show signs of wound healing; wound closure and no evidence of infection  Outcome: Progressing     Problem: Falls - Risk of:  Goal: Will remain free from falls  Description: Will remain free from falls  Outcome: Progressing

## 2023-05-19 NOTE — PROGRESS NOTES
Follow-Up Wound Progress Note    Ronald Colon  AGE: 80 y.o. GENDER: male  DOD: 3/17/1934  TODAY'S DATE:  5/19/23  Subjective:    Ronald Colon is a 80 y.o. male who presents today for wound check. Wound Etiology :  Other: CVLU with worsenng edema  Wound Location :  Bilateral and legs  Pain : {1/10     Abx : Absent       Overall Wound Assessment  Wound is has worsened slightly. Size has unchanged  Objective:    /63   Pulse 62   Temp 97.6 °F (36.4 °C) (Tympanic)   Resp 18   Ht 5' 9\" (1.753 m)   Wt 182 lb (82.6 kg)   BMI 26.88 kg/m²     Wound   serous exudate noted  Errythema - Present    Incision 03/28/17 Chest Left (Active)   Number of days: 2243       Wound 08/21/22 Wrist Left;Dorsal (Active)   Number of days: 270       Wound 08/24/22 Leg Right; Lower (Active)   Number of days: 267       Wound 05/05/23 Pretibial Left #1 (Active)   Wound Image   05/05/23 0949   Wound Etiology Venous 05/05/23 0949   Wound Length (cm) 10.4 cm 05/19/23 0941   Wound Width (cm) 7.2 cm 05/19/23 0941   Wound Depth (cm) 0.1 cm 05/19/23 0941   Wound Surface Area (cm^2) 74.88 cm^2 05/19/23 0941   Change in Wound Size % (l*w) -1542.11 05/19/23 0941   Wound Volume (cm^3) 7.488 cm^3 05/19/23 0941   Wound Healing % -1542 05/19/23 0941   Post-Procedure Length (cm) 0 cm 05/05/23 1025   Post-Procedure Width (cm) 0 cm 05/05/23 1025   Post-Procedure Depth (cm) 0 cm 05/05/23 1025   Post-Procedure Surface Area (cm^2) 0 cm^2 05/05/23 1025   Post-Procedure Volume (cm^3) 0 cm^3 05/05/23 1025   Wound Assessment Pink/red 05/19/23 0941   Drainage Amount Copious 05/19/23 0941   Drainage Description Yellow 05/19/23 0941   Odor Mild 05/19/23 0941   Hansa-wound Assessment Maceration;Blanchable erythema 05/19/23 0941   Number of days: 14       Wound 05/05/23 Leg Right; Lower #2 circumfrential (Active)   Wound Image   05/05/23 0949   Wound Length (cm) 20 cm 05/19/23 0941   Wound Width (cm) 37 cm 05/19/23 0941   Wound Depth (cm) 0.1 cm 05/19/23 0941

## 2023-05-31 NOTE — DISCHARGE INSTRUCTIONS
Discharge condition: Stable     Assessment of pain at discharge: none     Anesthetic used: 4% licocaine topically     Discharge to: Home     Left via:Private automobile     Accompanied by: son in law     ECF/HHA: [address]     Dressing Orders:         Left and right lower legs Cleanse with acetic acid solution,  apply alginate to open areas, ABD pads and wrap with Kerlix. Change daily. Apply spandigrip to bilateral lower legs on in the morning and off at night. Left buttock -  Apply skin barrier cream like Desitin  or  Pinxav (zinc) daily. Treatment Orders:   Weigh daily every morning. Take 2 Lasix if weight is 3 pounds more than the day before. Keep sodium under 2000 mg daily. [facility] followup visit _____________________2 weeks Dr. Jaime Liz ________  (Please note your next appointment above and if you are unable to keep, kindly give a 24 hour notice. Thank you.)     Physician signature:__________________________        If you experience any of the following, please call the [facility] during business hours:     * Increase in Pain  * Temperature over 101  * Increase in drainage from your wound  * Drainage with a foul odor  * Bleeding  * Increase in swelling  * Need for compression bandage changes due to slippage, breakthrough drainage. If you need medical attention outside of the business hours of the [facility] please contact your PCP or go to the nearest emergency room.

## 2023-06-02 ENCOUNTER — HOSPITAL ENCOUNTER (OUTPATIENT)
Dept: WOUND CARE | Age: 88
Discharge: HOME | End: 2023-06-02
Payer: MEDICARE

## 2023-06-02 VITALS
DIASTOLIC BLOOD PRESSURE: 76 MMHG | RESPIRATION RATE: 18 BRPM | HEIGHT: 69 IN | WEIGHT: 182 LBS | BODY MASS INDEX: 26.96 KG/M2 | SYSTOLIC BLOOD PRESSURE: 147 MMHG | HEART RATE: 66 BPM | TEMPERATURE: 97.9 F

## 2023-06-02 DIAGNOSIS — I87.333 CHRONIC VENOUS HTN W ULCER AND INFLAM OF BILATERAL LOW EXTRM (HCC): ICD-10-CM

## 2023-06-02 DIAGNOSIS — I73.9 PERIPHERAL VASCULAR DISEASE, UNSPECIFIED (HCC): ICD-10-CM

## 2023-06-02 DIAGNOSIS — S80.821S: Primary | ICD-10-CM

## 2023-06-02 PROCEDURE — 99213 OFFICE O/P EST LOW 20 MIN: CPT

## 2023-06-02 PROCEDURE — 99213 OFFICE O/P EST LOW 20 MIN: CPT | Performed by: FAMILY MEDICINE

## 2023-06-02 RX ORDER — CLOBETASOL PROPIONATE 0.5 MG/G
OINTMENT TOPICAL ONCE
OUTPATIENT
Start: 2023-06-02 | End: 2023-06-02

## 2023-06-02 RX ORDER — BACITRACIN ZINC AND POLYMYXIN B SULFATE 500; 1000 [USP'U]/G; [USP'U]/G
OINTMENT TOPICAL ONCE
OUTPATIENT
Start: 2023-06-02 | End: 2023-06-02

## 2023-06-02 RX ORDER — LIDOCAINE HYDROCHLORIDE 40 MG/ML
SOLUTION TOPICAL ONCE
Status: DISCONTINUED | OUTPATIENT
Start: 2023-06-02 | End: 2023-06-03 | Stop reason: HOSPADM

## 2023-06-02 RX ORDER — BETAMETHASONE DIPROPIONATE 0.05 %
OINTMENT (GRAM) TOPICAL ONCE
OUTPATIENT
Start: 2023-06-02 | End: 2023-06-02

## 2023-06-02 RX ORDER — GINSENG 100 MG
CAPSULE ORAL ONCE
OUTPATIENT
Start: 2023-06-02 | End: 2023-06-02

## 2023-06-02 RX ORDER — LIDOCAINE 50 MG/G
OINTMENT TOPICAL ONCE
OUTPATIENT
Start: 2023-06-02 | End: 2023-06-02

## 2023-06-02 RX ORDER — LIDOCAINE HYDROCHLORIDE 40 MG/ML
SOLUTION TOPICAL ONCE
OUTPATIENT
Start: 2023-06-02 | End: 2023-06-02

## 2023-06-02 RX ORDER — BACITRACIN, NEOMYCIN, POLYMYXIN B 400; 3.5; 5 [USP'U]/G; MG/G; [USP'U]/G
OINTMENT TOPICAL ONCE
OUTPATIENT
Start: 2023-06-02 | End: 2023-06-02

## 2023-06-02 RX ORDER — LIDOCAINE HYDROCHLORIDE 20 MG/ML
JELLY TOPICAL ONCE
OUTPATIENT
Start: 2023-06-02 | End: 2023-06-02

## 2023-06-02 RX ORDER — GENTAMICIN SULFATE 1 MG/G
OINTMENT TOPICAL ONCE
OUTPATIENT
Start: 2023-06-02 | End: 2023-06-02

## 2023-06-02 RX ORDER — LIDOCAINE 40 MG/G
CREAM TOPICAL ONCE
OUTPATIENT
Start: 2023-06-02 | End: 2023-06-02

## 2023-06-02 ASSESSMENT — PAIN DESCRIPTION - LOCATION: LOCATION: LEG

## 2023-06-02 ASSESSMENT — PAIN DESCRIPTION - DESCRIPTORS: DESCRIPTORS: BURNING;SHARP

## 2023-06-02 ASSESSMENT — PAIN DESCRIPTION - FREQUENCY: FREQUENCY: CONTINUOUS

## 2023-06-02 ASSESSMENT — PAIN DESCRIPTION - ONSET: ONSET: ON-GOING

## 2023-06-02 ASSESSMENT — PAIN SCALES - GENERAL: PAINLEVEL_OUTOF10: 5

## 2023-06-02 ASSESSMENT — PAIN - FUNCTIONAL ASSESSMENT: PAIN_FUNCTIONAL_ASSESSMENT: ACTIVITIES ARE NOT PREVENTED

## 2023-06-02 ASSESSMENT — PAIN DESCRIPTION - PAIN TYPE: TYPE: CHRONIC PAIN

## 2023-06-02 NOTE — PLAN OF CARE
Problem: Chronic Conditions and Co-morbidities  Goal: Patient's chronic conditions and co-morbidity symptoms are monitored and maintained or improved  Outcome: Progressing     Problem: Pain  Goal: Verbalizes/displays adequate comfort level or baseline comfort level  Outcome: Progressing     Problem: Wound:  Goal: Will show signs of wound healing; wound closure and no evidence of infection  Description: Will show signs of wound healing; wound closure and no evidence of infection  Outcome: Progressing     Problem: Pain  Goal: Verbalizes/displays adequate comfort level or baseline comfort level  Outcome: Progressing

## 2023-06-02 NOTE — PROGRESS NOTES
Follow-Up Wound Progress Note    Faye Parmar  AGE: 80 y.o. GENDER: male  DOD: 3/17/1934  TODAY'S DATE:  6/2/23  Subjective:    Faye Parmar is a 80 y.o. male who presents today for wound check. Wound Etiology :  Other: CVLU  Wound Location :  Bilateral and legs  Pain : {3/10     Abx : Absent       Overall Wound Assessment  Wound is has slightly improved. Size has decreased  Objective:    BP (!) 147/76   Pulse 66   Temp 97.9 °F (36.6 °C) (Temporal)   Resp 18   Ht 5' 9\" (1.753 m)   Wt 182 lb (82.6 kg)   BMI 26.88 kg/m²     Wound   serous exudate noted  Errythema - Present    Incision 03/28/17 Chest Left (Active)   Number of days: 2257       Wound 08/21/22 Wrist Left;Dorsal (Active)   Number of days: 284       Wound 08/24/22 Leg Right; Lower (Active)   Number of days: 281       Wound 05/05/23 Pretibial Left #1 (Active)   Wound Image   05/05/23 0949   Wound Etiology Venous 05/05/23 0949   Dressing Status New dressing applied;Clean;Dry; Intact 05/19/23 1031   Wound Cleansed Cleansed with saline 05/19/23 1031   Dressing/Treatment ABD; Alginate;Dry dressing 05/19/23 1031   Wound Length (cm) 11 cm 06/02/23 1024   Wound Width (cm) 10 cm 06/02/23 1024   Wound Depth (cm) 0.1 cm 06/02/23 1024   Wound Surface Area (cm^2) 110 cm^2 06/02/23 1024   Change in Wound Size % (l*w) -2312.28 06/02/23 1024   Wound Volume (cm^3) 11 cm^3 06/02/23 1024   Wound Healing % -2312 06/02/23 1024   Post-Procedure Length (cm) 0 cm 05/05/23 1025   Post-Procedure Width (cm) 0 cm 05/05/23 1025   Post-Procedure Depth (cm) 0 cm 05/05/23 1025   Post-Procedure Surface Area (cm^2) 0 cm^2 05/05/23 1025   Post-Procedure Volume (cm^3) 0 cm^3 05/05/23 1025   Wound Assessment Pink/red;Fibrin 06/02/23 1024   Drainage Amount Moderate 06/02/23 1024   Drainage Description Serosanguinous 06/02/23 1024   Odor None 06/02/23 1024   Hansa-wound Assessment Blanchable erythema 06/02/23 1024   Number of days: 28       Wound 05/05/23 Leg Right; Lower #2

## 2023-06-30 ENCOUNTER — HOSPITAL ENCOUNTER (OUTPATIENT)
Dept: WOUND CARE | Age: 88
Discharge: HOME | End: 2023-06-30
Payer: MEDICARE

## 2023-06-30 VITALS
HEART RATE: 61 BPM | WEIGHT: 182 LBS | BODY MASS INDEX: 26.96 KG/M2 | HEIGHT: 69 IN | TEMPERATURE: 97.9 F | DIASTOLIC BLOOD PRESSURE: 74 MMHG | SYSTOLIC BLOOD PRESSURE: 139 MMHG | RESPIRATION RATE: 18 BRPM

## 2023-06-30 DIAGNOSIS — I73.9 PERIPHERAL VASCULAR DISEASE, UNSPECIFIED (HCC): Primary | ICD-10-CM

## 2023-06-30 DIAGNOSIS — E11.621 DIABETIC ULCER OF TOE OF RIGHT FOOT ASSOCIATED WITH TYPE 2 DIABETES MELLITUS, WITH FAT LAYER EXPOSED (HCC): ICD-10-CM

## 2023-06-30 DIAGNOSIS — I87.333 CHRONIC VENOUS HTN W ULCER AND INFLAM OF BILATERAL LOW EXTRM (HCC): ICD-10-CM

## 2023-06-30 DIAGNOSIS — S80.821S: ICD-10-CM

## 2023-06-30 DIAGNOSIS — L97.512 DIABETIC ULCER OF TOE OF RIGHT FOOT ASSOCIATED WITH TYPE 2 DIABETES MELLITUS, WITH FAT LAYER EXPOSED (HCC): ICD-10-CM

## 2023-06-30 PROCEDURE — 11042 DBRDMT SUBQ TIS 1ST 20SQCM/<: CPT | Performed by: FAMILY MEDICINE

## 2023-06-30 PROCEDURE — 11042 DBRDMT SUBQ TIS 1ST 20SQCM/<: CPT

## 2023-06-30 RX ORDER — LIDOCAINE 40 MG/G
CREAM TOPICAL ONCE
OUTPATIENT
Start: 2023-06-30 | End: 2023-06-30

## 2023-06-30 RX ORDER — LIDOCAINE HYDROCHLORIDE 40 MG/ML
SOLUTION TOPICAL ONCE
Status: COMPLETED | OUTPATIENT
Start: 2023-06-30 | End: 2023-06-30

## 2023-06-30 RX ORDER — GENTAMICIN SULFATE 1 MG/G
OINTMENT TOPICAL ONCE
OUTPATIENT
Start: 2023-06-30 | End: 2023-06-30

## 2023-06-30 RX ORDER — LIDOCAINE HYDROCHLORIDE 20 MG/ML
JELLY TOPICAL ONCE
OUTPATIENT
Start: 2023-06-30 | End: 2023-06-30

## 2023-06-30 RX ORDER — IBUPROFEN 200 MG
TABLET ORAL ONCE
OUTPATIENT
Start: 2023-06-30 | End: 2023-06-30

## 2023-06-30 RX ORDER — CLOBETASOL PROPIONATE 0.5 MG/G
OINTMENT TOPICAL ONCE
OUTPATIENT
Start: 2023-06-30 | End: 2023-06-30

## 2023-06-30 RX ORDER — LIDOCAINE 50 MG/G
OINTMENT TOPICAL ONCE
OUTPATIENT
Start: 2023-06-30 | End: 2023-06-30

## 2023-06-30 RX ORDER — LIDOCAINE HYDROCHLORIDE 40 MG/ML
SOLUTION TOPICAL ONCE
OUTPATIENT
Start: 2023-06-30 | End: 2023-06-30

## 2023-06-30 RX ORDER — BETAMETHASONE DIPROPIONATE 0.05 %
OINTMENT (GRAM) TOPICAL ONCE
OUTPATIENT
Start: 2023-06-30 | End: 2023-06-30

## 2023-06-30 RX ORDER — BACITRACIN ZINC AND POLYMYXIN B SULFATE 500; 1000 [USP'U]/G; [USP'U]/G
OINTMENT TOPICAL ONCE
OUTPATIENT
Start: 2023-06-30 | End: 2023-06-30

## 2023-06-30 RX ORDER — GINSENG 100 MG
CAPSULE ORAL ONCE
OUTPATIENT
Start: 2023-06-30 | End: 2023-06-30

## 2023-06-30 RX ADMIN — LIDOCAINE HYDROCHLORIDE 10 ML: 40 SOLUTION TOPICAL at 10:26

## 2023-06-30 ASSESSMENT — PAIN - FUNCTIONAL ASSESSMENT: PAIN_FUNCTIONAL_ASSESSMENT: ACTIVITIES ARE NOT PREVENTED

## 2023-06-30 ASSESSMENT — PAIN DESCRIPTION - PAIN TYPE: TYPE: CHRONIC PAIN

## 2023-06-30 ASSESSMENT — PAIN DESCRIPTION - ONSET: ONSET: ON-GOING

## 2023-06-30 ASSESSMENT — PAIN DESCRIPTION - FREQUENCY: FREQUENCY: CONTINUOUS

## 2023-06-30 ASSESSMENT — PAIN DESCRIPTION - LOCATION: LOCATION: LEG

## 2023-06-30 ASSESSMENT — PAIN SCALES - GENERAL: PAINLEVEL_OUTOF10: 6

## 2023-06-30 ASSESSMENT — PAIN DESCRIPTION - ORIENTATION: ORIENTATION: RIGHT;LEFT

## 2023-06-30 ASSESSMENT — PAIN DESCRIPTION - DESCRIPTORS: DESCRIPTORS: BURNING;SHARP

## 2023-07-13 NOTE — DISCHARGE INSTRUCTIONS
Discharge condition: Stable     Assessment of pain at discharge: none     Anesthetic used: 4% licocaine topically     Discharge to: Home     Left via:Private automobile     Accompanied by: self     ECF/HHA: [address] Orders:          right lower legs Cleanse with acetic acid solution,  apply alginate to open areas, ABD pads and wrap with Kerlix. Change daily. Right foot - second digit cleasne with normal saline and apply alginate and dry dressing. Change daily. Apply spandigrip to bilateral lower legs on in the morning and off at night. Apply carmol 40 to bilateral lower legs for dryness daily                                        ALEM testing  to be scheduled. Treatment Orders:   Weigh daily every morning. Take 2 Lasix if weight is 3 pounds more than the day before. Keep sodium under 2000 mg daily. Xray to right foot second toe. [facility] followup visit _____________________2 weeks Dr. Colmenares________  (Please note your next appointment above and if you are unable to keep, kindly give a 24 hour notice. Thank you.)     Physician signature:__________________________        If you experience any of the following, please call the [facility] during business hours:     * Increase in Pain  * Temperature over 101  * Increase in drainage from your wound  * Drainage with a foul odor  * Bleeding  * Increase in swelling  * Need for compression bandage changes due to slippage, breakthrough drainage. If you need medical attention outside of the business hours of the [facility] please contact your PCP or go to the nearest emergency room.

## 2023-07-14 ENCOUNTER — HOSPITAL ENCOUNTER (OUTPATIENT)
Dept: WOUND CARE | Age: 88
Discharge: HOME | End: 2023-07-14
Payer: MEDICARE

## 2023-07-14 VITALS
HEART RATE: 68 BPM | WEIGHT: 182 LBS | TEMPERATURE: 97.2 F | DIASTOLIC BLOOD PRESSURE: 86 MMHG | BODY MASS INDEX: 26.96 KG/M2 | RESPIRATION RATE: 18 BRPM | SYSTOLIC BLOOD PRESSURE: 172 MMHG | HEIGHT: 69 IN

## 2023-07-14 DIAGNOSIS — E11.621 DIABETIC ULCER OF TOE OF RIGHT FOOT ASSOCIATED WITH TYPE 2 DIABETES MELLITUS, WITH FAT LAYER EXPOSED (HCC): ICD-10-CM

## 2023-07-14 DIAGNOSIS — R09.89 DIMINISHED PULSES IN LOWER EXTREMITY: ICD-10-CM

## 2023-07-14 DIAGNOSIS — I87.333 CHRONIC VENOUS HTN W ULCER AND INFLAM OF BILATERAL LOW EXTRM (HCC): ICD-10-CM

## 2023-07-14 DIAGNOSIS — I73.9 PERIPHERAL VASCULAR DISEASE, UNSPECIFIED (HCC): Primary | ICD-10-CM

## 2023-07-14 DIAGNOSIS — L97.512 DIABETIC ULCER OF TOE OF RIGHT FOOT ASSOCIATED WITH TYPE 2 DIABETES MELLITUS, WITH FAT LAYER EXPOSED (HCC): ICD-10-CM

## 2023-07-14 PROCEDURE — 11042 DBRDMT SUBQ TIS 1ST 20SQCM/<: CPT

## 2023-07-14 RX ORDER — CLOBETASOL PROPIONATE 0.5 MG/G
OINTMENT TOPICAL ONCE
OUTPATIENT
Start: 2023-07-14 | End: 2023-07-14

## 2023-07-14 RX ORDER — SODIUM CHLOR/HYPOCHLOROUS ACID 0.033 %
SOLUTION, IRRIGATION IRRIGATION ONCE
OUTPATIENT
Start: 2023-07-14 | End: 2023-07-14

## 2023-07-14 RX ORDER — BETAMETHASONE DIPROPIONATE 0.05 %
OINTMENT (GRAM) TOPICAL ONCE
OUTPATIENT
Start: 2023-07-14 | End: 2023-07-14

## 2023-07-14 RX ORDER — BACITRACIN ZINC AND POLYMYXIN B SULFATE 500; 1000 [USP'U]/G; [USP'U]/G
OINTMENT TOPICAL ONCE
OUTPATIENT
Start: 2023-07-14 | End: 2023-07-14

## 2023-07-14 RX ORDER — GENTAMICIN SULFATE 1 MG/G
OINTMENT TOPICAL ONCE
OUTPATIENT
Start: 2023-07-14 | End: 2023-07-14

## 2023-07-14 RX ORDER — LIDOCAINE 50 MG/G
OINTMENT TOPICAL ONCE
OUTPATIENT
Start: 2023-07-14 | End: 2023-07-14

## 2023-07-14 RX ORDER — LIDOCAINE HYDROCHLORIDE 40 MG/ML
SOLUTION TOPICAL ONCE
Status: COMPLETED | OUTPATIENT
Start: 2023-07-14 | End: 2023-07-14

## 2023-07-14 RX ORDER — GINSENG 100 MG
CAPSULE ORAL ONCE
OUTPATIENT
Start: 2023-07-14 | End: 2023-07-14

## 2023-07-14 RX ORDER — LIDOCAINE 40 MG/G
CREAM TOPICAL ONCE
OUTPATIENT
Start: 2023-07-14 | End: 2023-07-14

## 2023-07-14 RX ORDER — LIDOCAINE HYDROCHLORIDE 40 MG/ML
SOLUTION TOPICAL ONCE
OUTPATIENT
Start: 2023-07-14 | End: 2023-07-14

## 2023-07-14 RX ORDER — IBUPROFEN 200 MG
TABLET ORAL ONCE
OUTPATIENT
Start: 2023-07-14 | End: 2023-07-14

## 2023-07-14 RX ADMIN — LIDOCAINE HYDROCHLORIDE 10 ML: 40 SOLUTION TOPICAL at 09:59

## 2023-07-14 ASSESSMENT — PAIN DESCRIPTION - FREQUENCY: FREQUENCY: INTERMITTENT

## 2023-07-14 ASSESSMENT — PAIN DESCRIPTION - DESCRIPTORS: DESCRIPTORS: BURNING

## 2023-07-14 ASSESSMENT — PAIN DESCRIPTION - PAIN TYPE: TYPE: CHRONIC PAIN

## 2023-07-14 ASSESSMENT — PAIN DESCRIPTION - ORIENTATION: ORIENTATION: RIGHT;LEFT

## 2023-07-14 ASSESSMENT — PAIN SCALES - GENERAL: PAINLEVEL_OUTOF10: 5

## 2023-07-14 ASSESSMENT — PAIN DESCRIPTION - ONSET: ONSET: ON-GOING

## 2023-07-14 ASSESSMENT — PAIN DESCRIPTION - LOCATION: LOCATION: LEG

## 2023-07-14 ASSESSMENT — PAIN - FUNCTIONAL ASSESSMENT: PAIN_FUNCTIONAL_ASSESSMENT: ACTIVITIES ARE NOT PREVENTED

## 2023-07-14 NOTE — PROGRESS NOTES
3/11/2014    Cerebrovascular disease     CHF (congestive heart failure) (HCC)     Diabetes mellitus ([address])     Dislocated IOL (intraocular lens), posterior 11/15/2013    Hearing loss     Hip fracture requiring operative repair, left, closed, initial encounter ([address]) 3/17/2022    Hyperlipidemia     Hypertension     Osteoarthritis     Peripheral vascular disease ([address])     Renal cyst     Right cataract 2013    Type 2 diabetes mellitus with stage 3b chronic kidney disease, without long-term current use of insulin ([address]) 2022    Type 2 diabetes mellitus without complication ([facility])     Unspecified cerebral artery occlusion with cerebral infarction 10/2010    mild stroke     Past Surgical History:   Procedure Laterality Date    CATARACT REMOVAL WITH IMPLANT Right 2013    CATARACT REMOVAL WITH IMPLANT Left 2013    ENDOSCOPY, COLON, DIAGNOSTIC      EYE SURGERY Right 11/15/2013    dislocated intraocular lens    JOINT REPLACEMENT      left hip    UPPER GASTROINTESTINAL ENDOSCOPY N/A 2022    EGD ESOPHAGOGASTRODUODENOSCOPY performed by Miguel Weston MD at [facility] ENDOSCOPY     Family History   Problem Relation Age of Onset    Asthma Mother     High Blood Pressure Mother     Stroke Mother      Social History     Tobacco Use    Smoking status: Former     Packs/day: 1.00     Years: 30.00     Pack years: 30.00     Types: Cigars, Cigarettes     Quit date: 3/17/1998     Years since quittin.3    Smokeless tobacco: Former     Types: Chew     Quit date: 2015   Vaping Use    Vaping Use: Never used   Substance Use Topics    Alcohol use: Yes     Comment: 2 beers every few days    Drug use: No     Allergies   Allergen Reactions    Lipitor [Atorvastatin Calcium] Other (See Comments)     Muscle cramps    Vytorin [Ezetimibe-Simvastatin] Other (See Comments)     Leg Cramps       Current Outpatient Medications on File Prior to Encounter   Medication Sig Dispense Refill    Urea 40 % LOTN Apply 1 Dose topically daily

## 2023-07-25 NOTE — DISCHARGE INSTRUCTIONS
Discharge condition: Stable     Assessment of pain at discharge: none     Anesthetic used: 4% licocaine topically     Discharge to: Home     Left via:Private automobile     Accompanied by: self     ECF/HHA: [address] Orders:          right lower legs Cleanse with acetic acid solution,  apply alginate to open areas, ABD pads and wrap with Kerlix. Change daily. Right foot - second digit cleasne with normal saline and apply alginate and dry dressing. Change daily. Apply spandigrip to bilateral lower legs on in the morning and off at night. Apply carmol 40 to bilateral lower legs for dryness daily                                         ALEM testing  to be scheduled. Treatment Orders:   Weigh daily every morning. Take 2 Lasix if weight is 3 pounds more than the day before. Keep sodium under 2000 mg daily. Xray to right foot second toe. [facility] followup visit _____________________2 weeks Dr. Colmenares________  (Please note your next appointment above and if you are unable to keep, kindly give a 24 hour notice. Thank you.)     Physician signature:__________________________        If you experience any of the following, please call the [facility] during business hours:     * Increase in Pain  * Temperature over 101  * Increase in drainage from your wound  * Drainage with a foul odor  * Bleeding  * Increase in swelling  * Need for compression bandage changes due to slippage, breakthrough drainage. If you need medical attention outside of the business hours of the [address] [place] [address] please contact your PCP or go to the nearest emergency room.

## 2023-07-28 ENCOUNTER — HOSPITAL ENCOUNTER (OUTPATIENT)
Dept: WOUND CARE | Age: 88
Discharge: HOME | End: 2023-07-28

## 2023-08-02 NOTE — DISCHARGE INSTRUCTIONS
Discharge condition: Stable     Assessment of pain at discharge: none     Anesthetic used: 4% licocaine topically     Discharge to: Home     Left via:Private automobile     Accompanied by: self     ECF/HHA: [address] Orders:          right  and left lower legs Cleanse with acetic acid solution,  apply alginate to open areas, ABD pads and wrap with Kerlix. Change daily. Right foot - second digit cleasne with normal saline and apply alginate and dry dressing. Change daily. Apply spandigrip to bilateral lower legs on in the morning and off at night. Apply carmol 40 to bilateral lower legs for dryness daily                                         ALEM testing  to be scheduled. Treatment Orders:   Weigh daily every morning. Take 2 Lasix if weight is 3 pounds more than the day before. Keep sodium under 2000 mg daily. Xray to right foot second toe. Eat a diet high in protein and vitamin C. Take a multiple vitamin daily unless contraindicated. [address] [place] [address] followup visit: 2 weeks Dr. Colmenares____________________________  (Please note your next appointment above and if you are unable to keep, kindly give a 24 hour notice. Thank you.)     Physician signature:__________________________        If you experience any of the following, please call the [facility] during business hours:     * Increase in Pain  * Temperature over 101  * Increase in drainage from your wound  * Drainage with a foul odor  * Bleeding  * Increase in swelling  * Need for compression bandage changes due to slippage, breakthrough drainage. If you need medical attention outside of the business hours of the [address] [place] [address] please contact your PCP or go to the nearest emergency room.

## 2023-08-04 ENCOUNTER — HOSPITAL ENCOUNTER (OUTPATIENT)
Dept: WOUND CARE | Age: 88
Discharge: HOME | End: 2023-08-04
Payer: MEDICARE

## 2023-08-04 VITALS
HEART RATE: 84 BPM | SYSTOLIC BLOOD PRESSURE: 144 MMHG | RESPIRATION RATE: 18 BRPM | DIASTOLIC BLOOD PRESSURE: 76 MMHG | TEMPERATURE: 97.2 F | HEIGHT: 69 IN | WEIGHT: 182 LBS | BODY MASS INDEX: 26.96 KG/M2

## 2023-08-04 DIAGNOSIS — I73.9 PERIPHERAL VASCULAR DISEASE, UNSPECIFIED (HCC): ICD-10-CM

## 2023-08-04 DIAGNOSIS — S80.821S: ICD-10-CM

## 2023-08-04 DIAGNOSIS — Q80.9 ICHTHYOSIS: ICD-10-CM

## 2023-08-04 DIAGNOSIS — I87.333 CHRONIC VENOUS HTN W ULCER AND INFLAM OF BILATERAL LOW EXTRM (HCC): ICD-10-CM

## 2023-08-04 DIAGNOSIS — L97.512 DIABETIC ULCER OF TOE OF RIGHT FOOT ASSOCIATED WITH TYPE 2 DIABETES MELLITUS, WITH FAT LAYER EXPOSED (HCC): ICD-10-CM

## 2023-08-04 DIAGNOSIS — R09.89 DIMINISHED PULSES IN LOWER EXTREMITY: ICD-10-CM

## 2023-08-04 DIAGNOSIS — E11.621 DIABETIC ULCER OF TOE OF RIGHT FOOT ASSOCIATED WITH TYPE 2 DIABETES MELLITUS, WITH FAT LAYER EXPOSED (HCC): ICD-10-CM

## 2023-08-04 DIAGNOSIS — S80.821D: Primary | ICD-10-CM

## 2023-08-04 PROCEDURE — 11042 DBRDMT SUBQ TIS 1ST 20SQCM/<: CPT

## 2023-08-04 RX ORDER — BACITRACIN ZINC AND POLYMYXIN B SULFATE 500; 1000 [USP'U]/G; [USP'U]/G
OINTMENT TOPICAL ONCE
OUTPATIENT
Start: 2023-08-04 | End: 2023-08-04

## 2023-08-04 RX ORDER — GENTAMICIN SULFATE 1 MG/G
OINTMENT TOPICAL ONCE
OUTPATIENT
Start: 2023-08-04 | End: 2023-08-04

## 2023-08-04 RX ORDER — LIDOCAINE HYDROCHLORIDE 40 MG/ML
SOLUTION TOPICAL ONCE
OUTPATIENT
Start: 2023-08-04 | End: 2023-08-04

## 2023-08-04 RX ORDER — CLOBETASOL PROPIONATE 0.5 MG/G
OINTMENT TOPICAL ONCE
OUTPATIENT
Start: 2023-08-04 | End: 2023-08-04

## 2023-08-04 RX ORDER — SODIUM CHLOR/HYPOCHLOROUS ACID 0.033 %
SOLUTION, IRRIGATION IRRIGATION ONCE
OUTPATIENT
Start: 2023-08-04 | End: 2023-08-04

## 2023-08-04 RX ORDER — IBUPROFEN 200 MG
TABLET ORAL ONCE
OUTPATIENT
Start: 2023-08-04 | End: 2023-08-04

## 2023-08-04 RX ORDER — BETAMETHASONE DIPROPIONATE 0.05 %
OINTMENT (GRAM) TOPICAL ONCE
OUTPATIENT
Start: 2023-08-04 | End: 2023-08-04

## 2023-08-04 RX ORDER — LIDOCAINE HYDROCHLORIDE 40 MG/ML
SOLUTION TOPICAL ONCE
Status: COMPLETED | OUTPATIENT
Start: 2023-08-04 | End: 2023-08-04

## 2023-08-04 RX ORDER — LIDOCAINE 40 MG/G
CREAM TOPICAL ONCE
OUTPATIENT
Start: 2023-08-04 | End: 2023-08-04

## 2023-08-04 RX ORDER — GINSENG 100 MG
CAPSULE ORAL ONCE
OUTPATIENT
Start: 2023-08-04 | End: 2023-08-04

## 2023-08-04 RX ORDER — LIDOCAINE 50 MG/G
OINTMENT TOPICAL ONCE
OUTPATIENT
Start: 2023-08-04 | End: 2023-08-04

## 2023-08-04 RX ADMIN — LIDOCAINE HYDROCHLORIDE 10 ML: 40 SOLUTION TOPICAL at 10:07

## 2023-08-04 NOTE — PROGRESS NOTES
pads and wrap with Kerlix. Change daily. Right foot - second digit cleasne with normal saline and apply alginate and dry dressing. Change daily. Apply spandigrip to bilateral lower legs on in the morning and off at night. Apply carmol 40 to bilateral lower legs for dryness daily                                         ALEM testing  to be scheduled. Treatment Orders:   Weigh daily every morning. Take 2 Lasix if weight is 3 pounds more than the day before. Keep sodium under 2000 mg daily. Xray to right foot second toe. Eat a diet high in protein and vitamin C. Take a multiple vitamin daily unless contraindicated. [address] [place] [address] followup visit: 2 weeks Dr. Colmenares____________________________  (Please note your next appointment above and if you are unable to keep, kindly give a 24 hour notice. Thank you.)     Physician signature:__________________________        If you experience any of the following, please call the [address] [place] [address] during business hours:     * Increase in Pain  * Temperature over 101  * Increase in drainage from your wound  * Drainage with a foul odor  * Bleeding  * Increase in swelling  * Need for compression bandage changes due to slippage, breakthrough drainage. If you need medical attention outside of the business hours of the [address] [place] [address] please contact your PCP or go to the nearest emergency room.          Electronically signed by Boby Delgado DO on 8/4/2023 at 12:45 PM

## 2023-08-07 ENCOUNTER — TELEPHONE (OUTPATIENT)
Dept: INTERVENTIONAL RADIOLOGY/VASCULAR | Age: 88
End: 2023-08-07

## 2023-08-07 NOTE — TELEPHONE ENCOUNTER
Spoke with patient via phone conversation of upcoming appointment on 08/09/2023. Patient wished to cancel appointment as his son is in [facility] and he is unable to come that day. Patient will call us to reschedule.

## 2023-08-12 ENCOUNTER — APPOINTMENT (OUTPATIENT)
Dept: CT IMAGING | Age: 88
End: 2023-08-12
Payer: MEDICARE

## 2023-08-12 ENCOUNTER — APPOINTMENT (OUTPATIENT)
Dept: GENERAL RADIOLOGY | Age: 88
End: 2023-08-12
Payer: MEDICARE

## 2023-08-12 ENCOUNTER — HOSPITAL ENCOUNTER (EMERGENCY)
Age: 88
Discharge: ANOTHER HOSPITAL | End: 2023-08-12
Attending: STUDENT IN AN ORGANIZED HEALTH CARE EDUCATION/TRAINING PROGRAM
Payer: MEDICARE

## 2023-08-12 VITALS
HEART RATE: 62 BPM | OXYGEN SATURATION: 97 % | SYSTOLIC BLOOD PRESSURE: 169 MMHG | BODY MASS INDEX: 26.73 KG/M2 | WEIGHT: 181 LBS | RESPIRATION RATE: 17 BRPM | DIASTOLIC BLOOD PRESSURE: 87 MMHG

## 2023-08-12 DIAGNOSIS — I63.9 CEREBROVASCULAR ACCIDENT (CVA), UNSPECIFIED MECHANISM (HCC): Primary | ICD-10-CM

## 2023-08-12 DIAGNOSIS — I10 ESSENTIAL HYPERTENSION: ICD-10-CM

## 2023-08-12 LAB
ANION GAP SERPL CALCULATED.3IONS-SCNC: 13 MMOL/L (ref 7–16)
APAP SERPL-MCNC: <5 UG/ML (ref 10–30)
BASOPHILS # BLD: 0.11 K/UL (ref 0–0.2)
BASOPHILS NFR BLD: 1 % (ref 0–2)
BUN SERPL-MCNC: 16 MG/DL (ref 6–23)
CALCIUM SERPL-MCNC: 9.2 MG/DL (ref 8.6–10.2)
CHLORIDE SERPL-SCNC: 101 MMOL/L (ref 98–107)
CO2 SERPL-SCNC: 20 MMOL/L (ref 22–29)
CREAT SERPL-MCNC: 1 MG/DL (ref 0.7–1.2)
EOSINOPHIL # BLD: 0.38 K/UL (ref 0.05–0.5)
EOSINOPHILS RELATIVE PERCENT: 4 % (ref 0–6)
ERYTHROCYTE [DISTWIDTH] IN BLOOD BY AUTOMATED COUNT: 15 % (ref 11.5–15)
ETHANOLAMINE SERPL-MCNC: 19 MG/DL
GFR SERPL CREATININE-BSD FRML MDRD: >60 ML/MIN/1.73M2
GLUCOSE SERPL-MCNC: 102 MG/DL (ref 74–99)
HCT VFR BLD AUTO: 39.4 % (ref 37–54)
HGB BLD-MCNC: 12.7 G/DL (ref 12.5–16.5)
IMM GRANULOCYTES # BLD AUTO: 0.05 K/UL (ref 0–0.58)
IMM GRANULOCYTES NFR BLD: 1 % (ref 0–5)
INR PPP: 1.1
LYMPHOCYTES NFR BLD: 1.27 K/UL (ref 1.5–4)
LYMPHOCYTES RELATIVE PERCENT: 13 % (ref 20–42)
MCH RBC QN AUTO: 30.7 PG (ref 26–35)
MCHC RBC AUTO-ENTMCNC: 32.2 G/DL (ref 32–34.5)
MCV RBC AUTO: 95.2 FL (ref 80–99.9)
MONOCYTES NFR BLD: 1.03 K/UL (ref 0.1–0.95)
MONOCYTES NFR BLD: 11 % (ref 2–12)
NEUTROPHILS NFR BLD: 71 % (ref 43–80)
NEUTS SEG NFR BLD: 6.95 K/UL (ref 1.8–7.3)
PARTIAL THROMBOPLASTIN TIME: 30.1 SEC (ref 24.5–35.1)
PLATELET # BLD AUTO: 195 K/UL (ref 130–450)
PMV BLD AUTO: 10.6 FL (ref 7–12)
POTASSIUM SERPL-SCNC: 4 MMOL/L (ref 3.5–5)
PROTHROMBIN TIME: 12.2 SEC (ref 9.3–12.4)
RBC # BLD AUTO: 4.14 M/UL (ref 3.8–5.8)
SALICYLATES SERPL-MCNC: <0.3 MG/DL (ref 0–30)
SODIUM SERPL-SCNC: 134 MMOL/L (ref 132–146)
TOXIC TRICYCLIC SC,BLOOD: NEGATIVE
TROPONIN I SERPL HS-MCNC: 44 NG/L (ref 0–11)
WBC OTHER # BLD: 9.8 K/UL (ref 4.5–11.5)

## 2023-08-12 PROCEDURE — 80307 DRUG TEST PRSMV CHEM ANLYZR: CPT

## 2023-08-12 PROCEDURE — G0480 DRUG TEST DEF 1-7 CLASSES: HCPCS

## 2023-08-12 PROCEDURE — 71045 X-RAY EXAM CHEST 1 VIEW: CPT

## 2023-08-12 PROCEDURE — 93005 ELECTROCARDIOGRAM TRACING: CPT | Performed by: STUDENT IN AN ORGANIZED HEALTH CARE EDUCATION/TRAINING PROGRAM

## 2023-08-12 PROCEDURE — 80048 BASIC METABOLIC PNL TOTAL CA: CPT

## 2023-08-12 PROCEDURE — 6360000004 HC RX CONTRAST MEDICATION: Performed by: RADIOLOGY

## 2023-08-12 PROCEDURE — 70450 CT HEAD/BRAIN W/O DYE: CPT

## 2023-08-12 PROCEDURE — 85730 THROMBOPLASTIN TIME PARTIAL: CPT

## 2023-08-12 PROCEDURE — 6360000002 HC RX W HCPCS: Performed by: STUDENT IN AN ORGANIZED HEALTH CARE EDUCATION/TRAINING PROGRAM

## 2023-08-12 PROCEDURE — 96374 THER/PROPH/DIAG INJ IV PUSH: CPT

## 2023-08-12 PROCEDURE — 85610 PROTHROMBIN TIME: CPT

## 2023-08-12 PROCEDURE — 70498 CT ANGIOGRAPHY NECK: CPT

## 2023-08-12 PROCEDURE — 84484 ASSAY OF TROPONIN QUANT: CPT

## 2023-08-12 PROCEDURE — 80179 DRUG ASSAY SALICYLATE: CPT

## 2023-08-12 PROCEDURE — 80143 DRUG ASSAY ACETAMINOPHEN: CPT

## 2023-08-12 PROCEDURE — 70496 CT ANGIOGRAPHY HEAD: CPT

## 2023-08-12 PROCEDURE — 6370000000 HC RX 637 (ALT 250 FOR IP): Performed by: STUDENT IN AN ORGANIZED HEALTH CARE EDUCATION/TRAINING PROGRAM

## 2023-08-12 PROCEDURE — 99285 EMERGENCY DEPT VISIT HI MDM: CPT

## 2023-08-12 PROCEDURE — 85025 COMPLETE CBC W/AUTO DIFF WBC: CPT

## 2023-08-12 RX ORDER — PRAVASTATIN SODIUM 40 MG
40 TABLET ORAL NIGHTLY
COMMUNITY

## 2023-08-12 RX ORDER — ASPIRIN 81 MG/1
324 TABLET, CHEWABLE ORAL ONCE
Status: DISCONTINUED | OUTPATIENT
Start: 2023-08-12 | End: 2023-08-12

## 2023-08-12 RX ORDER — RAMIPRIL 2.5 MG/1
2.5 CAPSULE ORAL DAILY
COMMUNITY

## 2023-08-12 RX ORDER — HYDRALAZINE HYDROCHLORIDE 20 MG/ML
10 INJECTION INTRAMUSCULAR; INTRAVENOUS ONCE
Status: COMPLETED | OUTPATIENT
Start: 2023-08-12 | End: 2023-08-12

## 2023-08-12 RX ORDER — CLOPIDOGREL 300 MG/1
300 TABLET, FILM COATED ORAL ONCE
Status: DISCONTINUED | OUTPATIENT
Start: 2023-08-12 | End: 2023-08-12 | Stop reason: HOSPADM

## 2023-08-12 RX ORDER — ASPIRIN 300 MG/1
300 SUPPOSITORY RECTAL ONCE
Status: COMPLETED | OUTPATIENT
Start: 2023-08-12 | End: 2023-08-12

## 2023-08-12 RX ADMIN — IOPAMIDOL 75 ML: 755 INJECTION, SOLUTION INTRAVENOUS at 16:36

## 2023-08-12 RX ADMIN — ASPIRIN 300 MG: 300 SUPPOSITORY RECTAL at 17:47

## 2023-08-12 RX ADMIN — HYDRALAZINE HYDROCHLORIDE 10 MG: 20 INJECTION, SOLUTION INTRAMUSCULAR; INTRAVENOUS at 16:57

## 2023-08-12 ASSESSMENT — ENCOUNTER SYMPTOMS
NAUSEA: 0
WHEEZING: 0
EYE PAIN: 0
SINUS PRESSURE: 0
BACK PAIN: 0
DIARRHEA: 0
SHORTNESS OF BREATH: 0
VOMITING: 0
COUGH: 0
EYE REDNESS: 0
ABDOMINAL PAIN: 0
SORE THROAT: 0
EYE DISCHARGE: 0

## 2023-08-13 LAB
EKG ATRIAL RATE: 202 BPM
EKG Q-T INTERVAL: 484 MS
EKG QRS DURATION: 158 MS
EKG QTC CALCULATION (BAZETT): 484 MS
EKG R AXIS: 138 DEGREES
EKG T AXIS: 56 DEGREES
EKG VENTRICULAR RATE: 60 BPM

## 2023-08-13 NOTE — ED NOTES
Call received from Viky Miguel at [facility] regarding pts pharmacy, PCP and TNK administration      Gwendolyn Lazar RN  08/13/23 [id number]

## 2023-08-14 PROCEDURE — 93010 ELECTROCARDIOGRAM REPORT: CPT | Performed by: INTERNAL MEDICINE

## 2023-08-18 ENCOUNTER — HOSPITAL ENCOUNTER (OUTPATIENT)
Dept: WOUND CARE | Age: 88
Discharge: HOME | End: 2023-08-18

## 2023-09-27 PROBLEM — I63.512 LEFT ACUTE ARTERIAL ISCHEMIC STROKE, MCA (MIDDLE CEREBRAL ARTERY) (HCC): Status: ACTIVE | Noted: 2023-09-27

## 2023-09-27 PROBLEM — I63.9 ARTERIAL ISCHEMIC STROKE (HCC): Status: ACTIVE | Noted: 2023-09-27

## 2023-10-27 ENCOUNTER — HOSPITAL ENCOUNTER (OUTPATIENT)
Dept: WOUND CARE | Age: 88
Discharge: HOME | End: 2023-10-27
Payer: MEDICARE

## 2023-10-27 VITALS
SYSTOLIC BLOOD PRESSURE: 119 MMHG | HEART RATE: 62 BPM | WEIGHT: 165 LBS | BODY MASS INDEX: 24.44 KG/M2 | TEMPERATURE: 97 F | RESPIRATION RATE: 18 BRPM | DIASTOLIC BLOOD PRESSURE: 53 MMHG | HEIGHT: 69 IN

## 2023-10-27 DIAGNOSIS — I73.9 PERIPHERAL VASCULAR DISEASE, UNSPECIFIED (HCC): ICD-10-CM

## 2023-10-27 DIAGNOSIS — R09.89 DIMINISHED PULSES IN LOWER EXTREMITY: ICD-10-CM

## 2023-10-27 DIAGNOSIS — Z71.1 FEARED COMPLAINT WITHOUT DIAGNOSIS: ICD-10-CM

## 2023-10-27 DIAGNOSIS — S81.819A SKIN TEAR OF LOWER LEG WITHOUT COMPLICATION, INITIAL ENCOUNTER: Primary | ICD-10-CM

## 2023-10-27 DIAGNOSIS — R60.9 PERIPHERAL EDEMA: ICD-10-CM

## 2023-10-27 DIAGNOSIS — I87.333 CHRONIC VENOUS HTN W ULCER AND INFLAM OF BILATERAL LOW EXTRM (HCC): ICD-10-CM

## 2023-10-27 DIAGNOSIS — I87.2 VENOUS INSUFFICIENCY OF BOTH LOWER EXTREMITIES: ICD-10-CM

## 2023-10-27 DIAGNOSIS — E11.628 TYPE 2 DIABETES MELLITUS WITH OTHER SKIN COMPLICATION, WITHOUT LONG-TERM CURRENT USE OF INSULIN (HCC): ICD-10-CM

## 2023-10-27 PROBLEM — Z71.3 DIETARY COUNSELING: Status: ACTIVE | Noted: 2023-10-27

## 2023-10-27 PROBLEM — Z72.9: Status: ACTIVE | Noted: 2023-10-27

## 2023-10-27 PROBLEM — Z71.89 COUNSELING ON INJURY PREVENTION: Status: ACTIVE | Noted: 2023-10-27

## 2023-10-27 PROBLEM — R60.0 PERIPHERAL EDEMA: Status: ACTIVE | Noted: 2023-10-27

## 2023-10-27 PROBLEM — E11.9 DIABETES MELLITUS (HCC): Status: ACTIVE | Noted: 2023-10-27

## 2023-10-27 PROCEDURE — 97602 WOUND(S) CARE NON-SELECTIVE: CPT | Performed by: SURGERY

## 2023-10-27 PROCEDURE — 99214 OFFICE O/P EST MOD 30 MIN: CPT | Performed by: SURGERY

## 2023-10-27 RX ORDER — GINSENG 100 MG
CAPSULE ORAL ONCE
OUTPATIENT
Start: 2023-10-27 | End: 2023-10-27

## 2023-10-27 RX ORDER — CLOBETASOL PROPIONATE 0.5 MG/G
OINTMENT TOPICAL ONCE
OUTPATIENT
Start: 2023-10-27 | End: 2023-10-27

## 2023-10-27 RX ORDER — LIDOCAINE 40 MG/G
CREAM TOPICAL ONCE
OUTPATIENT
Start: 2023-10-27 | End: 2023-10-27

## 2023-10-27 RX ORDER — TRIAMCINOLONE ACETONIDE 1 MG/G
OINTMENT TOPICAL ONCE
OUTPATIENT
Start: 2023-10-27 | End: 2023-10-27

## 2023-10-27 RX ORDER — LIDOCAINE HYDROCHLORIDE 40 MG/ML
SOLUTION TOPICAL ONCE
OUTPATIENT
Start: 2023-10-27 | End: 2023-10-27

## 2023-10-27 RX ORDER — IBUPROFEN 200 MG
TABLET ORAL ONCE
OUTPATIENT
Start: 2023-10-27 | End: 2023-10-27

## 2023-10-27 RX ORDER — LIDOCAINE HYDROCHLORIDE 40 MG/ML
SOLUTION TOPICAL ONCE
Status: COMPLETED | OUTPATIENT
Start: 2023-10-27 | End: 2023-10-27

## 2023-10-27 RX ORDER — SODIUM CHLOR/HYPOCHLOROUS ACID 0.033 %
SOLUTION, IRRIGATION IRRIGATION ONCE
OUTPATIENT
Start: 2023-10-27 | End: 2023-10-27

## 2023-10-27 RX ORDER — BACITRACIN ZINC AND POLYMYXIN B SULFATE 500; 1000 [USP'U]/G; [USP'U]/G
OINTMENT TOPICAL ONCE
OUTPATIENT
Start: 2023-10-27 | End: 2023-10-27

## 2023-10-27 RX ORDER — LIDOCAINE 50 MG/G
OINTMENT TOPICAL ONCE
OUTPATIENT
Start: 2023-10-27 | End: 2023-10-27

## 2023-10-27 RX ORDER — GENTAMICIN SULFATE 1 MG/G
OINTMENT TOPICAL ONCE
OUTPATIENT
Start: 2023-10-27 | End: 2023-10-27

## 2023-10-27 RX ORDER — BETAMETHASONE DIPROPIONATE 0.05 %
OINTMENT (GRAM) TOPICAL ONCE
OUTPATIENT
Start: 2023-10-27 | End: 2023-10-27

## 2023-10-27 RX ADMIN — LIDOCAINE HYDROCHLORIDE 10 ML: 40 SOLUTION TOPICAL at 10:15

## 2023-10-27 ASSESSMENT — PAIN DESCRIPTION - DESCRIPTORS: DESCRIPTORS: ACHING

## 2023-10-27 ASSESSMENT — PAIN SCALES - GENERAL: PAINLEVEL_OUTOF10: 5

## 2023-10-27 ASSESSMENT — PAIN - FUNCTIONAL ASSESSMENT: PAIN_FUNCTIONAL_ASSESSMENT: PREVENTS OR INTERFERES SOME ACTIVE ACTIVITIES AND ADLS

## 2023-10-27 ASSESSMENT — PAIN DESCRIPTION - FREQUENCY: FREQUENCY: INTERMITTENT

## 2023-10-27 ASSESSMENT — PAIN DESCRIPTION - PAIN TYPE: TYPE: CHRONIC PAIN

## 2023-10-27 ASSESSMENT — PAIN DESCRIPTION - ONSET: ONSET: ON-GOING

## 2023-10-27 ASSESSMENT — PAIN DESCRIPTION - LOCATION: LOCATION: LEG

## 2023-10-27 ASSESSMENT — PAIN DESCRIPTION - ORIENTATION: ORIENTATION: RIGHT;LEFT

## 2023-10-27 NOTE — DISCHARGE INSTRUCTIONS
Visit Discharge/Physician Orders    Discharge condition: Stable    Assessment of pain at discharge:none    Anesthetic used: 4% lidocaine topically    Discharge to: Home    Left via:Private automobile    Accompanied by: daughter    ECF/HHA: [facility] - please fax NEW ORDERS    Dressing Orders: Cleanse left leg with normal saline apply alginate and dry dressing change daily. Compression stockings to bilateral lower legs. Wash legs once or twice with baby shampoo, pat dry. Elevate legs 20 minutes 4 times a day to reduce swelling and edema. Treatment Orders:FOLLOW NUTRITIOUS DIET. CHOOSE FOODS HIGH IN PROTEIN -CHICKEN- FISH-AND EGGS,  CHOOSE FOODS HIGH IN VITAMIN C.   MULTIVITAMIN DAILY. FOLLOW NUTRITIOUS DIET. CHOOSE FOODS HIGH IN PROTEIN -CHICKEN- FISH-AND EGGS,  CHOOSE FOODS HIGH IN VITAMIN C.   MULTIVITAMIN DAILY. Watch Sodium intake less than 2,000 mg daily. [facility] followup visit ______________3 weeks   Dr. Colmenares_______________  (Please note your next appointment above and if you are unable to keep, kindly give a 24 hour notice. Thank you.)    Physician signature:__________________________      If you experience any of the following, please call the [facility] during business hours:    * Increase in Pain  * Temperature over 101  * Increase in drainage from your wound  * Drainage with a foul odor  * Bleeding  * Increase in swelling  * Need for compression bandage changes due to slippage, breakthrough drainage. If you need medical attention outside of the business hours of the [address] [place] [address] please contact your PCP or go to the nearest emergency room.

## 2023-10-27 NOTE — PLAN OF CARE
Problem: Chronic Conditions and Co-morbidities  Goal: Patient's chronic conditions and co-morbidity symptoms are monitored and maintained or improved  Outcome: Adequate for Discharge     Problem: ABCDS Injury Assessment  Goal: Absence of physical injury  Outcome: Adequate for Discharge     Problem: Pain  Goal: Verbalizes/displays adequate comfort level or baseline comfort level  Outcome: Adequate for Discharge     Problem: Wound:  Goal: Will show signs of wound healing; wound closure and no evidence of infection  Description: Will show signs of wound healing; wound closure and no evidence of infection  Outcome: Adequate for Discharge     Problem: Venous:  Goal: Signs of wound healing will improve  Description: Signs of wound healing will improve  Outcome: Adequate for Discharge

## 2023-10-27 NOTE — PROGRESS NOTES
Diminished pulses in lower extremity    Venous insufficiency of both lower extremities       Endocrine    Diabetic ulcer of toe of right foot associated with type 2 diabetes mellitus, with fat layer exposed ([address])       Other    Skin tear of lower leg without complication, initial encounter - Primary    Feared complaint without diagnosis    Peripheral edema       Pre Debridement Measurements:  Are located in the [facility]  Documentation Flow Sheet  Post Debridement Measurements:  Wound/Ulcer Descriptions are Pre Debridement except measurements:     Incision 03/28/17 Chest Left (Active)   Number of days: 2404       Wound 08/21/22 Wrist Left;Dorsal (Active)   Number of days: 431       Wound 08/24/22 Leg Right; Lower (Active)   Number of days: 428       Wound 10/27/23 Pretibial Left #1 (Active)   Wound Image   10/27/23 1011   Wound Etiology Traumatic 10/27/23 1011   Dressing Status New dressing applied;Clean;Dry; Intact 10/27/23 1056   Wound Cleansed Cleansed with saline 10/27/23 1056   Dressing/Treatment Alginate;ABD;Dry dressing 10/27/23 1056   Wound Length (cm) 6 cm 10/27/23 1011   Wound Width (cm) 3.8 cm 10/27/23 1011   Wound Depth (cm) 0.1 cm 10/27/23 1011   Wound Surface Area (cm^2) 22.8 cm^2 10/27/23 1011   Wound Volume (cm^3) 2.28 cm^3 10/27/23 1011   Post-Procedure Length (cm) 6 cm 10/27/23 1027   Post-Procedure Width (cm) 3.8 cm 10/27/23 1027   Post-Procedure Depth (cm) 0.1 cm 10/27/23 1027   Post-Procedure Surface Area (cm^2) 22.8 cm^2 10/27/23 1027   Post-Procedure Volume (cm^3) 2.28 cm^3 10/27/23 1027   Wound Assessment Pink/red 10/27/23 1011   Drainage Amount Small (< 25%) 10/27/23 1011   Drainage Description Serosanguinous 10/27/23 1011   Odor None 10/27/23 1011   Hansa-wound Assessment Blanchable erythema;Fragile 10/27/23 1011   Margins Attached edges 10/27/23 1011   Wound Thickness Description not for Pressure Injury Full thickness 10/27/23 1011   Number of days: 0          Procedure Note  Not done.

## 2023-11-17 ENCOUNTER — HOSPITAL ENCOUNTER (OUTPATIENT)
Dept: WOUND CARE | Age: 88
Discharge: HOME | End: 2023-11-17

## 2024-01-24 ENCOUNTER — TELEPHONE (OUTPATIENT)
Dept: FAMILY MEDICINE CLINIC | Age: 89
End: 2024-01-24

## 2024-01-24 NOTE — TELEPHONE ENCOUNTER
Melissa/[facility] called to inform that patient will be discharged from [facility] today and was given home health care orders with Dr. Talley as his PCP.  Melissa asked if Dr. Talley will follow.  I informed Melissa that patient has not scheduled an appt w/Dr. Talley since he moved back to , therefore, unsure if patient is following Dr. Talley to  or if he's staying at [facility].  Melissa informed me that she will call patient to ask.  Melissa asked that I send a msg to Dr. Talley if he will follow, pending patient appt.    Last appt 9/6/2023

## 2024-01-24 NOTE — TELEPHONE ENCOUNTER
Spoke with Melissa and notified.  Melissa verbalized understanding.  Patient scheduled an appointment on 1/30/24

## 2024-01-30 ENCOUNTER — TELEPHONE (OUTPATIENT)
Dept: FAMILY MEDICINE CLINIC | Age: 89
End: 2024-01-30

## 2024-01-30 NOTE — TELEPHONE ENCOUNTER
Patient's daughter, Aurora, called stating patient is unable to come to his Hospital Follow Up appt today at 10:30 and she wanted to know if she can bring him in this afternoon.  I informed that there are no other appt times for Hospital Follow Up, due to the amount of time needed.  Aurora stated that patient is adamant about not coming in, and also stated he reportedly fell last night, onto his knees.  I advised that patient go to the ED for eval/X rays.  Appt RS to 2/2/24 at 12:00 pm.  Aurora informed that she and her  are on their way from [place], where they live, to patient's home and will try to get him to go to the ED.  Msg routed, for informational purposes.    Last seen 9/12/2022  Next appt 2/2/2024

## 2024-02-02 ENCOUNTER — OFFICE VISIT (OUTPATIENT)
Dept: FAMILY MEDICINE CLINIC | Age: 89
End: 2024-02-02

## 2024-02-02 VITALS
BODY MASS INDEX: 24.44 KG/M2 | HEIGHT: 69 IN | WEIGHT: 165 LBS | SYSTOLIC BLOOD PRESSURE: 132 MMHG | DIASTOLIC BLOOD PRESSURE: 78 MMHG | HEART RATE: 84 BPM | OXYGEN SATURATION: 98 % | TEMPERATURE: 97.4 F | RESPIRATION RATE: 16 BRPM

## 2024-02-02 DIAGNOSIS — Z91.89 AT RISK FOR SELF CARE DEFICIT: ICD-10-CM

## 2024-02-02 DIAGNOSIS — Z09 HOSPITAL DISCHARGE FOLLOW-UP: ICD-10-CM

## 2024-02-02 DIAGNOSIS — N18.32 TYPE 2 DIABETES MELLITUS WITH STAGE 3B CHRONIC KIDNEY DISEASE, WITHOUT LONG-TERM CURRENT USE OF INSULIN (HCC): Chronic | ICD-10-CM

## 2024-02-02 DIAGNOSIS — E11.22 TYPE 2 DIABETES MELLITUS WITH STAGE 3B CHRONIC KIDNEY DISEASE, WITHOUT LONG-TERM CURRENT USE OF INSULIN (HCC): Chronic | ICD-10-CM

## 2024-02-02 DIAGNOSIS — I73.9 PAD (PERIPHERAL ARTERY DISEASE) (HCC): ICD-10-CM

## 2024-02-02 DIAGNOSIS — L03.116 BILATERAL LOWER LEG CELLULITIS: Primary | ICD-10-CM

## 2024-02-02 DIAGNOSIS — I87.2 VENOUS INSUFFICIENCY: ICD-10-CM

## 2024-02-02 DIAGNOSIS — N18.32 STAGE 3B CHRONIC KIDNEY DISEASE (HCC): Chronic | ICD-10-CM

## 2024-02-02 DIAGNOSIS — M79.2 NEUROPATHIC PAIN: ICD-10-CM

## 2024-02-02 DIAGNOSIS — I10 ESSENTIAL HYPERTENSION: Chronic | ICD-10-CM

## 2024-02-02 DIAGNOSIS — L03.115 BILATERAL LOWER LEG CELLULITIS: Primary | ICD-10-CM

## 2024-02-02 DIAGNOSIS — I48.0 PAROXYSMAL A-FIB (HCC): Chronic | ICD-10-CM

## 2024-02-02 PROBLEM — N18.30 STAGE 3 CHRONIC KIDNEY DISEASE (HCC): Chronic | Status: ACTIVE | Noted: 2022-03-19

## 2024-02-02 PROBLEM — N17.9 ACUTE KIDNEY INJURY (HCC): Status: RESOLVED | Noted: 2022-09-12 | Resolved: 2024-02-02

## 2024-02-02 RX ORDER — AMMONIUM LACTATE 12 G/100G
LOTION TOPICAL PRN
COMMUNITY
Start: 2024-01-15

## 2024-02-02 RX ORDER — GABAPENTIN 300 MG/1
300 CAPSULE ORAL NIGHTLY
COMMUNITY

## 2024-02-02 RX ORDER — DOXYCYCLINE HYCLATE 100 MG
100 TABLET ORAL 2 TIMES DAILY
COMMUNITY
Start: 2024-01-24

## 2024-02-02 RX ORDER — ATORVASTATIN CALCIUM 80 MG/1
80 TABLET, FILM COATED ORAL DAILY
COMMUNITY
End: 2024-02-02

## 2024-02-02 RX ORDER — ATORVASTATIN CALCIUM 40 MG/1
40 TABLET, FILM COATED ORAL DAILY
Qty: 90 TABLET | Refills: 1 | Status: SHIPPED | OUTPATIENT
Start: 2024-02-02

## 2024-02-02 ASSESSMENT — PATIENT HEALTH QUESTIONNAIRE - PHQ9
SUM OF ALL RESPONSES TO PHQ QUESTIONS 1-9: 0
SUM OF ALL RESPONSES TO PHQ QUESTIONS 1-9: 0
2. FEELING DOWN, DEPRESSED OR HOPELESS: 0
SUM OF ALL RESPONSES TO PHQ9 QUESTIONS 1 & 2: 0
1. LITTLE INTEREST OR PLEASURE IN DOING THINGS: 0
SUM OF ALL RESPONSES TO PHQ QUESTIONS 1-9: 0
SUM OF ALL RESPONSES TO PHQ QUESTIONS 1-9: 0

## 2024-02-02 NOTE — PROGRESS NOTES
sPost-Discharge Transitional Care Follow Up    Omar Zayas   YOB: 1934    Date of Office Visit:  2/2/2024  Date of Hospital Admission: 1/11/2024  Date of Hospital Discharge: 1/24/2024    Chief Complaint   Patient presents with    Constipation     No BM for 3 days; c/o abd pain; OTC prune juice and half a magnesium unsure of mg    Follow-Up from Hospital     Wound infection; was in [facility]; discharged 9 days ago    Urinary Frequency     Increased urination every hour and half until they cut the water pill down       Care management risk score Rising risk (score 2-5) and Complex Care (Scores >=6): No Risk Score On File     Non face to face  following discharge, date last encounter closed (first attempt may have been earlier): *No documented post hospital discharge outreach found in the last 14 days     Call initiated 2 business days of discharge: *No response recorded in the last 14 days    ASSESSMENT/PLAN:   Bilateral lower leg cellulitis  -Some erythema and superficial wounds noted without overt evidence of ongoing cellulitis, patient and patient's family report overall improvement since discharge.  Some consideration for resuming/extending antibiotic course but with reported improvement decision was made to monitor closely and call if any worsening.  Discussed that with the wounds and edema there is the potential for recurrence of cellulitis, signs and symptoms of which were discussed extensively and agreed to call should any develop.  Continue diuretic.  Refer to ID at [facility] per patient request.  Refer to vascular surgery at [facility] per patient request due to patients known PAD and likely venous insufficiency.  -     External Referral To Infectious Disease  -     Comprehensive Metabolic Panel; Future  -     CBC with Auto Differential; Future  -     Hemoglobin A1C; Future  -     Lipid Panel; Future  -     TSH; Future  -     T4, Free; Future  -     Vitamin D 25 Hydroxy; Future  -     Vitamin B12 & Folate;

## 2024-02-03 ASSESSMENT — ENCOUNTER SYMPTOMS
SHORTNESS OF BREATH: 0
ABDOMINAL PAIN: 0
COLOR CHANGE: 1
COUGH: 0
CONSTIPATION: 1

## 2024-02-06 ENCOUNTER — TELEPHONE (OUTPATIENT)
Dept: FAMILY MEDICINE CLINIC | Age: 89
End: 2024-02-06

## 2024-02-07 NOTE — TELEPHONE ENCOUNTER
LSW initiated first phone call to patient on 2.6.24 at 2:10pm regarding referral to Social Work Department. LSW was unable to make contact with pt but was able to leave a voicemail for pt with reason for call and contact information. LSW will continue to attempt contact with pt.

## 2024-02-13 ENCOUNTER — SOCIAL WORK (OUTPATIENT)
Dept: FAMILY MEDICINE CLINIC | Age: 89
End: 2024-02-13

## 2024-02-13 NOTE — PROGRESS NOTES
LSW initiated second phone call to patient on 2.13.24 at 11:55am regarding referral to Social Work Department. LSW was unable to make contact with pt but was able to leave a voicemail for pt with reason for call and contact information. LSW will continue to attempt contact with pt.

## 2024-02-20 ENCOUNTER — SOCIAL WORK (OUTPATIENT)
Dept: FAMILY MEDICINE CLINIC | Age: 89
End: 2024-02-20

## 2024-02-20 NOTE — PROGRESS NOTES
SW reached out to patient on 2.19.24. LSW was able to speak with pts daughter. Daughter stated that her and her sister have POA for their father, but LSW would be better to talk to daughter, Mirtha. Daughter, Aurora, stated she would let Mirtha know to follow up with LSW ASAP.

## 2024-02-21 RX ORDER — GABAPENTIN 300 MG/1
300 CAPSULE ORAL NIGHTLY
Qty: 30 CAPSULE | Refills: 1 | Status: SHIPPED | OUTPATIENT
Start: 2024-02-21 | End: 2024-04-21

## 2024-02-26 ENCOUNTER — SOCIAL WORK (OUTPATIENT)
Dept: FAMILY MEDICINE CLINIC | Age: 89
End: 2024-02-26

## 2024-02-26 NOTE — PROGRESS NOTES
LSW was able to make contact with pts daughter, Mirtha, on 2.26.24. Daughter explained the difficulties they have been having with pt. Daughter stated that they believe he is ready to transition to a SNF, but pt is reluctant. LSW suggested [facility]. Daughter said she was reluctant to schedule assessment due to father not agreeing. LSW suggested scheduling and having the  talk with pt about benefits. Daughter agreed and stated she would follow-up with [facility].   LSW provided daughter with information on [facility], who helps families find home health and SNF for patients.

## 2024-02-27 ENCOUNTER — OFFICE VISIT (OUTPATIENT)
Dept: FAMILY MEDICINE CLINIC | Age: 89
End: 2024-02-27
Payer: MEDICARE

## 2024-02-27 VITALS
DIASTOLIC BLOOD PRESSURE: 78 MMHG | HEART RATE: 60 BPM | RESPIRATION RATE: 18 BRPM | SYSTOLIC BLOOD PRESSURE: 128 MMHG | OXYGEN SATURATION: 91 %

## 2024-02-27 DIAGNOSIS — J40 BRONCHITIS: Primary | ICD-10-CM

## 2024-02-27 DIAGNOSIS — J01.01 ACUTE RECURRENT MAXILLARY SINUSITIS: ICD-10-CM

## 2024-02-27 PROCEDURE — 99213 OFFICE O/P EST LOW 20 MIN: CPT | Performed by: FAMILY MEDICINE

## 2024-02-27 PROCEDURE — 1124F ACP DISCUSS-NO DSCNMKR DOCD: CPT | Performed by: FAMILY MEDICINE

## 2024-02-27 RX ORDER — CEFDINIR 300 MG/1
300 CAPSULE ORAL 2 TIMES DAILY
Qty: 20 CAPSULE | Refills: 0 | Status: SHIPPED | OUTPATIENT
Start: 2024-02-27 | End: 2024-03-08

## 2024-02-27 RX ORDER — PREDNISONE 10 MG/1
10 TABLET ORAL DAILY
Qty: 5 TABLET | Refills: 0 | Status: SHIPPED | OUTPATIENT
Start: 2024-02-27 | End: 2024-03-03

## 2024-02-27 RX ORDER — ALBUTEROL SULFATE 90 UG/1
2 AEROSOL, METERED RESPIRATORY (INHALATION) 4 TIMES DAILY PRN
Qty: 18 G | Refills: 5 | Status: SHIPPED | OUTPATIENT
Start: 2024-02-27

## 2024-02-27 RX ORDER — BENZONATATE 200 MG/1
200 CAPSULE ORAL 3 TIMES DAILY PRN
Qty: 30 CAPSULE | Refills: 0 | Status: SHIPPED | OUTPATIENT
Start: 2024-02-27 | End: 2024-03-08

## 2024-02-28 ENCOUNTER — TELEPHONE (OUTPATIENT)
Dept: FAMILY MEDICINE CLINIC | Age: 89
End: 2024-02-28

## 2024-02-28 NOTE — TELEPHONE ENCOUNTER
Pt never received abx from Jodi Lundberg, attempted to call in but line is busy. Will attempt e prescribe.

## 2024-03-25 ENCOUNTER — TELEPHONE (OUTPATIENT)
Dept: FAMILY MEDICINE CLINIC | Age: 89
End: 2024-03-25

## 2024-03-25 NOTE — TELEPHONE ENCOUNTER
Called and rescheduled appt to 4/25/24.  Advised Dr Contreras isn't taking new patients at this time.

## 2024-03-25 NOTE — TELEPHONE ENCOUNTER
----- Message from Ese Tayloris sent at 3/25/2024  9:09 AM EDT -----  Subject: Appointment Request    Reason for Call: Established Patient Appointment needed: Routine Existing   Condition Follow Up    QUESTIONS    Reason for appointment request? No appointments available during search     Additional Information for Provider? Pt is requesting to have a follow up   with PCP Guillermo Contreras please call pt to advise.   ---------------------------------------------------------------------------  --------------  CALL BACK INFO  [phone]; OK to leave message on voicemail  ---------------------------------------------------------------------------  --------------  SCRIPT ANSWERS

## 2024-04-10 ENCOUNTER — TELEPHONE (OUTPATIENT)
Dept: FAMILY MEDICINE CLINIC | Age: 89
End: 2024-04-10

## 2024-04-10 NOTE — TELEPHONE ENCOUNTER
Celeste/[facility] nurse called to inform that she was out to see patient who previously had venous stasis bilateral extremities and currently has an area on his Rt leg that's inflamed and red and has an ulcer on his Lt inner ankle.  Celeste informed that she treated patient with supplies for his wounds, but wanted to know if Dr. Talley has any other advisement and also if patient should be seen sooner than his 4/25/24 appt.  Please advise.    Last seen 2/27/2024  Next appt 4/25/2024  Jodi Lundberg/ANDRES [facility]

## 2024-04-10 NOTE — TELEPHONE ENCOUNTER
I called Celeste/[facility] and informed her, as noted by Dr. Talley.  Celeste stated she informed patient when she was there to follow up with Wound Care, ID and Vascular Surgery and patient stated he's not going to any of those appts.    I called patient and left a detailed  msg informing him, as noted by Dr. Talley.    I called patient's daughter, Aurora, LMOM requesting a call back.    Aurora called back and I informed her, as noted by Dr. Talley.  Aurora stated patient is very strong-willed and refusing to go.  Aurora stated she will try to get patient to go back to Wound Care.

## 2024-04-10 NOTE — TELEPHONE ENCOUNTER
No he was referred to vascular surgery and ID last visit and had stopped seeing wound care. I strongly recommend he return to wound care d/t ongoing/recurrent venous stasis ulcers and follow ID, vasc surgery recommendations.

## 2024-04-12 ENCOUNTER — TELEPHONE (OUTPATIENT)
Dept: FAMILY MEDICINE CLINIC | Age: 89
End: 2024-04-12

## 2024-04-12 DIAGNOSIS — M79.2 NEUROPATHIC PAIN: ICD-10-CM

## 2024-04-12 DIAGNOSIS — L03.119 CELLULITIS OF LOWER EXTREMITY, UNSPECIFIED LATERALITY: Primary | ICD-10-CM

## 2024-04-12 RX ORDER — DOXYCYCLINE HYCLATE 100 MG
100 TABLET ORAL 2 TIMES DAILY
Qty: 20 TABLET | Refills: 0 | Status: SHIPPED | OUTPATIENT
Start: 2024-04-12 | End: 2024-04-22

## 2024-04-12 RX ORDER — GABAPENTIN 300 MG/1
300 CAPSULE ORAL NIGHTLY
Qty: 30 CAPSULE | Refills: 1 | Status: SHIPPED | OUTPATIENT
Start: 2024-04-12 | End: 2024-06-11

## 2024-04-12 NOTE — TELEPHONE ENCOUNTER
Dr Talley.. do you want to send an rx according to wound notes in media or do you want a culture performed ?

## 2024-04-12 NOTE — TELEPHONE ENCOUNTER
Daughter Mirtha Santillan is calling and said visiting nurse was at [facility] and she told daughter he needs to be on antibiotic. He has weeping wounds on legs. Please adivse.     Last seen 2/27/2024  Next appt 4/25/2024     [facility] [address]

## 2024-04-25 ENCOUNTER — OFFICE VISIT (OUTPATIENT)
Dept: FAMILY MEDICINE CLINIC | Age: 89
End: 2024-04-25
Payer: MEDICARE

## 2024-04-25 VITALS
WEIGHT: 176 LBS | SYSTOLIC BLOOD PRESSURE: 138 MMHG | OXYGEN SATURATION: 99 % | DIASTOLIC BLOOD PRESSURE: 84 MMHG | TEMPERATURE: 97.2 F | RESPIRATION RATE: 16 BRPM | HEART RATE: 78 BPM | BODY MASS INDEX: 26.07 KG/M2 | HEIGHT: 69 IN

## 2024-04-25 DIAGNOSIS — N18.32 STAGE 3B CHRONIC KIDNEY DISEASE (HCC): Chronic | ICD-10-CM

## 2024-04-25 DIAGNOSIS — I83.029 VENOUS STASIS ULCERS OF BOTH LOWER EXTREMITIES (HCC): Primary | ICD-10-CM

## 2024-04-25 DIAGNOSIS — I87.2 VENOUS INSUFFICIENCY: ICD-10-CM

## 2024-04-25 DIAGNOSIS — E11.22 TYPE 2 DIABETES MELLITUS WITH STAGE 3B CHRONIC KIDNEY DISEASE, WITHOUT LONG-TERM CURRENT USE OF INSULIN (HCC): Chronic | ICD-10-CM

## 2024-04-25 DIAGNOSIS — N18.32 TYPE 2 DIABETES MELLITUS WITH STAGE 3B CHRONIC KIDNEY DISEASE, WITHOUT LONG-TERM CURRENT USE OF INSULIN (HCC): Chronic | ICD-10-CM

## 2024-04-25 DIAGNOSIS — Z09 HOSPITAL DISCHARGE FOLLOW-UP: ICD-10-CM

## 2024-04-25 DIAGNOSIS — E55.9 VITAMIN D DEFICIENCY: ICD-10-CM

## 2024-04-25 DIAGNOSIS — L03.116 BILATERAL LOWER LEG CELLULITIS: ICD-10-CM

## 2024-04-25 DIAGNOSIS — L03.115 BILATERAL LOWER LEG CELLULITIS: ICD-10-CM

## 2024-04-25 DIAGNOSIS — I48.0 PAROXYSMAL A-FIB (HCC): Chronic | ICD-10-CM

## 2024-04-25 DIAGNOSIS — I10 ESSENTIAL HYPERTENSION: Chronic | ICD-10-CM

## 2024-04-25 DIAGNOSIS — M79.2 NEUROPATHIC PAIN: ICD-10-CM

## 2024-04-25 DIAGNOSIS — I87.333 CHRONIC VENOUS HTN W ULCER AND INFLAM OF BILATERAL LOW EXTRM (HCC): ICD-10-CM

## 2024-04-25 DIAGNOSIS — I73.9 PAD (PERIPHERAL ARTERY DISEASE) (HCC): ICD-10-CM

## 2024-04-25 DIAGNOSIS — L97.929 VENOUS STASIS ULCERS OF BOTH LOWER EXTREMITIES (HCC): Primary | ICD-10-CM

## 2024-04-25 DIAGNOSIS — I83.019 VENOUS STASIS ULCERS OF BOTH LOWER EXTREMITIES (HCC): Primary | ICD-10-CM

## 2024-04-25 DIAGNOSIS — L97.919 VENOUS STASIS ULCERS OF BOTH LOWER EXTREMITIES (HCC): Primary | ICD-10-CM

## 2024-04-25 DIAGNOSIS — E78.5 DYSLIPIDEMIA: ICD-10-CM

## 2024-04-25 DIAGNOSIS — I73.9 PERIPHERAL VASCULAR DISEASE, UNSPECIFIED (HCC): ICD-10-CM

## 2024-04-25 PROBLEM — L97.512 DIABETIC ULCER OF TOE OF RIGHT FOOT ASSOCIATED WITH TYPE 2 DIABETES MELLITUS, WITH FAT LAYER EXPOSED (HCC): Status: RESOLVED | Noted: 2023-07-14 | Resolved: 2024-04-25

## 2024-04-25 PROBLEM — K92.2 GIB (GASTROINTESTINAL BLEEDING): Status: RESOLVED | Noted: 2022-08-21 | Resolved: 2024-04-25

## 2024-04-25 PROBLEM — E11.621 DIABETIC ULCER OF TOE OF RIGHT FOOT ASSOCIATED WITH TYPE 2 DIABETES MELLITUS, WITH FAT LAYER EXPOSED (HCC): Status: RESOLVED | Noted: 2023-07-14 | Resolved: 2024-04-25

## 2024-04-25 PROBLEM — Z86.73 HISTORY OF STROKE: Status: ACTIVE | Noted: 2023-09-27

## 2024-04-25 PROBLEM — S80.821A: Status: RESOLVED | Noted: 2022-06-14 | Resolved: 2024-04-25

## 2024-04-25 PROBLEM — R09.89 DIMINISHED PULSES IN LOWER EXTREMITY: Status: RESOLVED | Noted: 2023-07-14 | Resolved: 2024-04-25

## 2024-04-25 LAB
ALBUMIN: 3.7 G/DL (ref 3.5–5.2)
ALP BLD-CCNC: 88 U/L (ref 40–129)
ALT SERPL-CCNC: <5 U/L (ref 0–40)
ANION GAP SERPL CALCULATED.3IONS-SCNC: 17 MMOL/L (ref 7–16)
AST SERPL-CCNC: 17 U/L (ref 0–39)
BASOPHILS ABSOLUTE: 0.1 K/UL (ref 0–0.2)
BASOPHILS RELATIVE PERCENT: 1 % (ref 0–2)
BILIRUB SERPL-MCNC: 0.3 MG/DL (ref 0–1.2)
BUN BLDV-MCNC: 41 MG/DL (ref 6–23)
CALCIUM SERPL-MCNC: 9.3 MG/DL (ref 8.6–10.2)
CHLORIDE BLD-SCNC: 111 MMOL/L (ref 98–107)
CHOLESTEROL: 135 MG/DL
CO2: 16 MMOL/L (ref 22–29)
CREAT SERPL-MCNC: 1.5 MG/DL (ref 0.7–1.2)
EOSINOPHILS ABSOLUTE: 0.31 K/UL (ref 0.05–0.5)
EOSINOPHILS RELATIVE PERCENT: 4 % (ref 0–6)
GFR SERPL CREATININE-BSD FRML MDRD: 43 ML/MIN/1.73M2
GLUCOSE BLD-MCNC: 108 MG/DL (ref 74–99)
HCT VFR BLD CALC: 37.2 % (ref 37–54)
HDLC SERPL-MCNC: 51 MG/DL
HEMOGLOBIN: 11.5 G/DL (ref 12.5–16.5)
IMMATURE GRANULOCYTES %: 1 % (ref 0–5)
IMMATURE GRANULOCYTES ABSOLUTE: 0.05 K/UL (ref 0–0.58)
LDL CHOLESTEROL: 73 MG/DL
LYMPHOCYTES ABSOLUTE: 1.47 K/UL (ref 1.5–4)
LYMPHOCYTES RELATIVE PERCENT: 19 % (ref 20–42)
MCH RBC QN AUTO: 31.5 PG (ref 26–35)
MCHC RBC AUTO-ENTMCNC: 30.9 G/DL (ref 32–34.5)
MCV RBC AUTO: 101.9 FL (ref 80–99.9)
MONOCYTES ABSOLUTE: 0.82 K/UL (ref 0.1–0.95)
MONOCYTES RELATIVE PERCENT: 11 % (ref 2–12)
NEUTROPHILS ABSOLUTE: 5.07 K/UL (ref 1.8–7.3)
NEUTROPHILS RELATIVE PERCENT: 65 % (ref 43–80)
PDW BLD-RTO: 15.2 % (ref 11.5–15)
PLATELET # BLD: 207 K/UL (ref 130–450)
PMV BLD AUTO: 11.4 FL (ref 7–12)
POTASSIUM SERPL-SCNC: 5 MMOL/L (ref 3.5–5)
RBC # BLD: 3.65 M/UL (ref 3.8–5.8)
SODIUM BLD-SCNC: 144 MMOL/L (ref 132–146)
T4 FREE: 1 NG/DL (ref 0.9–1.7)
TOTAL PROTEIN: 6.3 G/DL (ref 6.4–8.3)
TRIGL SERPL-MCNC: 53 MG/DL
TSH SERPL DL<=0.05 MIU/L-ACNC: 2.49 UIU/ML (ref 0.27–4.2)
VITAMIN D 25-HYDROXY: 11.6 NG/ML (ref 30–100)
VLDLC SERPL CALC-MCNC: 11 MG/DL
WBC # BLD: 7.8 K/UL (ref 4.5–11.5)

## 2024-04-25 PROCEDURE — 1124F ACP DISCUSS-NO DSCNMKR DOCD: CPT | Performed by: FAMILY MEDICINE

## 2024-04-25 PROCEDURE — 99214 OFFICE O/P EST MOD 30 MIN: CPT | Performed by: FAMILY MEDICINE

## 2024-04-25 SDOH — ECONOMIC STABILITY: FOOD INSECURITY: WITHIN THE PAST 12 MONTHS, YOU WORRIED THAT YOUR FOOD WOULD RUN OUT BEFORE YOU GOT MONEY TO BUY MORE.: NEVER TRUE

## 2024-04-25 SDOH — ECONOMIC STABILITY: INCOME INSECURITY: HOW HARD IS IT FOR YOU TO PAY FOR THE VERY BASICS LIKE FOOD, HOUSING, MEDICAL CARE, AND HEATING?: NOT HARD AT ALL

## 2024-04-25 SDOH — ECONOMIC STABILITY: FOOD INSECURITY: WITHIN THE PAST 12 MONTHS, THE FOOD YOU BOUGHT JUST DIDN'T LAST AND YOU DIDN'T HAVE MONEY TO GET MORE.: NEVER TRUE

## 2024-04-25 ASSESSMENT — ENCOUNTER SYMPTOMS
COLOR CHANGE: 1
SHORTNESS OF BREATH: 0
ROS SKIN COMMENTS: LOWER LIMBS
CHEST TIGHTNESS: 0
BLOOD IN STOOL: 0

## 2024-04-25 NOTE — PROGRESS NOTES
Omar Zayas   Patient is a 89 y.o. year old male who presents with:  Chief Complaint   Patient presents with    Follow-up     Blood work not completed will get today    Leg Swelling     F/U susanne lower leg cellulitis:; nurse comes to visit and assess legs; has help most nights to help     Patient also follows with: cardiology - Dr. Knight, ophthalmology  In the past wound care    HPI    Presents today w daughter. Pt w hx poor compliance.     Labs were not done, will do today.     Interim  Seen for TCM, was to f/u w VS and ID and referral orders were placed but has not followed through with the referrals. Has not f/u with wound care. Has HHC. Recently treated for cellulitis with improvement.     Overdue for cardiology apt, needs PM check. Is to schedule apt.     Saw derm in the past and has had multiple lesions removed, per pts daughter decision was made to stop addressing the lesions and is no longer seeing derm.     Atrial fibrillation  Chronic atrial fibrillation   Hx bradycardia  Has pacemaker  Current treatment Xarelto 15 mg daily     HTN  Current treatment: Lisinopril 10 mg daily   Recent changes in medication: none  BP Readings from Last 3 Encounters:   04/25/24 138/84   02/27/24 128/78   02/02/24 132/78     Dyslipidemia  Current treatment: Atorvastatin 40 mg nightly   Recent changes in medication: dose was decreased d/t GI AE which have improved w the dose decrease  Indications for statin therapy include: Clinical ASCVD, DM.   Did not tolerate atorvastatin in the past  Lab Results   Component Value Date    CHOL 201 (H) 01/05/2023    HDL 56 01/05/2023    LDLCALC 132 (H) 01/05/2023    TRIG 67 01/05/2023     DM  Current treatment: None   Recent changes in medications: appears glipizide was stopped on discharge 8/2022  Lab Results   Component Value Date    LABA1C 6.6 (H) 01/05/2023    LABA1C 6.7 (H) 08/23/2022    LABA1C 6.6 06/13/2022    MALBCR SEE NOTE 01/05/2023     Neuropathic pain  Current tx gabapentin

## 2024-04-26 ENCOUNTER — TELEPHONE (OUTPATIENT)
Dept: FAMILY MEDICINE CLINIC | Age: 89
End: 2024-04-26

## 2024-04-26 DIAGNOSIS — E53.8 FOLATE DEFICIENCY: ICD-10-CM

## 2024-04-26 DIAGNOSIS — E55.9 VITAMIN D DEFICIENCY: Primary | ICD-10-CM

## 2024-04-26 LAB
FOLATE: 3.8 NG/ML (ref 4.8–24.2)
HBA1C MFR BLD: 7.2 % (ref 4–5.6)
VITAMIN B-12: 294 PG/ML (ref 211–946)

## 2024-04-26 RX ORDER — FOLIC ACID 1 MG/1
1 TABLET ORAL DAILY
Qty: 90 TABLET | Refills: 1 | Status: SHIPPED | OUTPATIENT
Start: 2024-04-26

## 2024-04-26 NOTE — TELEPHONE ENCOUNTER
Please advise the patient or patient's sister that folate and vitamin D are low.  Prescriptions for supplements have been sent to his pharmacy.

## 2024-05-20 NOTE — DISCHARGE INSTRUCTIONS
Visit Discharge/Physician Orders    Discharge condition: Stable    Assessment of pain at discharge: none    Anesthetic used:  none    Discharge to: Home    Left via:Private automobile    Accompanied by:  daughter    [facility]F/H[facility]:  [facility]    Dressing Orders: none needed no wounds. Lyphedema therapy for bilateral lower legs .    Treatment Orders:FOLLOW NUTRITIOUS DIET. CHOOSE FOODS HIGH IN PROTEIN -CHICKEN- FISH-AND EGGS,  CHOOSE FOODS HIGH IN VITAMIN C.   MULTIVITAMIN DAILY.      [facility] followup visit _______discharge______________________  (Please note your next appointment above and if you are unable to keep, kindly give a 24 hour notice. Thank you.)    Physician signature:__________________________      If you experience any of the following, please call the Wound Care Center during business hours:    * Increase in Pain  * Temperature over 101  * Increase in drainage from your wound  * Drainage with a foul odor  * Bleeding  * Increase in swelling  * Need for compression bandage changes due to slippage, breakthrough drainage.    If you need medical attention outside of the business hours of the Wound Care Centers please contact your PCP or go to the nearest emergency room.

## 2024-05-23 ENCOUNTER — HOSPITAL ENCOUNTER (OUTPATIENT)
Dept: WOUND CARE | Age: 89
Discharge: HOME | End: 2024-05-23
Payer: MEDICARE

## 2024-05-23 VITALS
TEMPERATURE: 96.8 F | RESPIRATION RATE: 18 BRPM | WEIGHT: 176 LBS | SYSTOLIC BLOOD PRESSURE: 145 MMHG | BODY MASS INDEX: 26.07 KG/M2 | HEIGHT: 69 IN | DIASTOLIC BLOOD PRESSURE: 56 MMHG | HEART RATE: 61 BPM

## 2024-05-23 DIAGNOSIS — D48.9 NEOPLASM OF UNCERTAIN BEHAVIOR: ICD-10-CM

## 2024-05-23 DIAGNOSIS — I89.0 LYMPHEDEMA: ICD-10-CM

## 2024-05-23 DIAGNOSIS — C44.91 BASAL CELL CARCINOMA (BCC), UNSPECIFIED SITE: Primary | ICD-10-CM

## 2024-05-23 DIAGNOSIS — Q80.9 ICHTHYOSIS: ICD-10-CM

## 2024-05-23 PROCEDURE — 99212 OFFICE O/P EST SF 10 MIN: CPT

## 2024-05-23 PROCEDURE — 99213 OFFICE O/P EST LOW 20 MIN: CPT | Performed by: SURGERY

## 2024-05-23 NOTE — PROGRESS NOTES
Depth (cm) 0.1 cm 10/27/23 1027   Post-Procedure Surface Area (cm^2) 22.8 cm^2 10/27/23 1027   Post-Procedure Volume (cm^3) 2.28 cm^3 10/27/23 1027   Wound Assessment Pink/red 10/27/23 1011   Drainage Amount Small (< 25%) 10/27/23 1011   Drainage Description Serosanguinous 10/27/23 1011   Odor None 10/27/23 1011   Hansa-wound Assessment Blanchable erythema;Fragile 10/27/23 1011   Margins Attached edges 10/27/23 1011   Wound Thickness Description not for Pressure Injury Full thickness 10/27/23 1011   Number of days: 209          Procedure Note  No open wound    Plan:     Pt is not a smoker    In my professional opinion and based off the information that is available at this time this patient has appropriate indication for HBO Therapy: Maybe    Treatment Note please see attached Discharge Instructions    Written patient dismissal instructions given to patient and signed by patient or POA.         See hpi    Discharge Instructions         Visit Discharge/Physician Orders    Discharge condition: Stable    Assessment of pain at discharge: none    Anesthetic used:  none    Discharge to: Home    Left via:Private automobile    Accompanied by:  daughter    ECF/HHA:  [facility]    Dressing Orders: none needed no wounds. Lyphedema therapy for bilateral lower legs .    Treatment Orders:FOLLOW NUTRITIOUS DIET. CHOOSE FOODS HIGH IN PROTEIN -CHICKEN- FISH-AND EGGS,  CHOOSE FOODS HIGH IN VITAMIN C.   MULTIVITAMIN DAILY.      [facility] followup visit _______discharge______________________  (Please note your next appointment above and if you are unable to keep, kindly give a 24 hour notice. Thank you.)    Physician signature:__________________________      If you experience any of the following, please call the Wound Care Center during business hours:    * Increase in Pain  * Temperature over 101  * Increase in drainage from your wound  * Drainage with a foul odor  * Bleeding  * Increase in swelling  * Need for compression bandage changes due

## 2024-05-28 DIAGNOSIS — I10 ESSENTIAL HYPERTENSION: ICD-10-CM

## 2024-05-28 NOTE — TELEPHONE ENCOUNTER
Pt  daughter Aurora called for a refill  lisinopril  Last seen 4/25/2024  Next appt Visit date not found / pt other daughter will RS apt for MICHELET MOONEY/Pear[facility]

## 2024-05-29 RX ORDER — LISINOPRIL 10 MG/1
10 TABLET ORAL DAILY
Qty: 90 TABLET | Refills: 1 | Status: SHIPPED | OUTPATIENT
Start: 2024-05-29

## 2024-08-06 ENCOUNTER — TELEPHONE (OUTPATIENT)
Dept: FAMILY MEDICINE CLINIC | Age: 89
End: 2024-08-06

## 2024-08-06 DIAGNOSIS — L97.929 VENOUS STASIS ULCERS OF BOTH LOWER EXTREMITIES (HCC): Primary | ICD-10-CM

## 2024-08-06 DIAGNOSIS — I83.029 VENOUS STASIS ULCERS OF BOTH LOWER EXTREMITIES (HCC): Primary | ICD-10-CM

## 2024-08-06 DIAGNOSIS — L03.119 CELLULITIS OF LOWER EXTREMITY, UNSPECIFIED LATERALITY: ICD-10-CM

## 2024-08-06 DIAGNOSIS — I83.019 VENOUS STASIS ULCERS OF BOTH LOWER EXTREMITIES (HCC): Primary | ICD-10-CM

## 2024-08-06 DIAGNOSIS — L97.919 VENOUS STASIS ULCERS OF BOTH LOWER EXTREMITIES (HCC): Primary | ICD-10-CM

## 2024-08-06 NOTE — TELEPHONE ENCOUNTER
Pt daughter Aurora called to request home care through [facility] to assist with care of pt's cellulitis.     [facility] - [phone]

## 2024-08-08 NOTE — TELEPHONE ENCOUNTER
Is it OK to place orders for Homecare with [facility] for cellulitis? Order pended, just needs filled in and signed off

## 2024-09-25 DIAGNOSIS — E55.9 VITAMIN D DEFICIENCY: ICD-10-CM

## 2024-09-25 DIAGNOSIS — M79.2 NEUROPATHIC PAIN: ICD-10-CM

## 2024-09-25 RX ORDER — GABAPENTIN 300 MG/1
300 CAPSULE ORAL NIGHTLY
Qty: 30 CAPSULE | Refills: 0 | Status: SHIPPED | OUTPATIENT
Start: 2024-09-25 | End: 2024-10-25

## 2024-10-15 DIAGNOSIS — I48.0 PAROXYSMAL A-FIB (HCC): ICD-10-CM

## 2024-10-15 RX ORDER — RIVAROXABAN 15 MG/1
15 TABLET, FILM COATED ORAL
Qty: 90 TABLET | Refills: 1 | Status: SHIPPED | OUTPATIENT
Start: 2024-10-15

## 2024-10-15 NOTE — TELEPHONE ENCOUNTER
Name of Medication(s) Requested:  Requested Prescriptions     Pending Prescriptions Disp Refills    XARELTO 15 MG TABS tablet 90 tablet 1     Sig: Take 1 tablet by mouth Daily with supper       Medication is on current medication list Yes    Dosage and directions were verified? Yes    Quantity verified: 90 day supply     Pharmacy Verified?  Yes    Last Appointment:  4/25/2024    Future appts:  Future Appointments   Date Time Provider Department Center   10/29/2024 11:15 AM Luis E Talley DO [facility] DEP        (If no appt send self scheduling link. .REFILLAPPT)  Scheduling request sent?     [] Yes  [x] No    Does patient need updated?  [] Yes  [x] No

## 2024-10-29 ENCOUNTER — OFFICE VISIT (OUTPATIENT)
Dept: FAMILY MEDICINE CLINIC | Age: 89
End: 2024-10-29

## 2024-10-29 VITALS
RESPIRATION RATE: 18 BRPM | HEART RATE: 78 BPM | SYSTOLIC BLOOD PRESSURE: 136 MMHG | DIASTOLIC BLOOD PRESSURE: 78 MMHG | BODY MASS INDEX: 26.07 KG/M2 | OXYGEN SATURATION: 98 % | HEIGHT: 69 IN | TEMPERATURE: 98.2 F | WEIGHT: 176 LBS

## 2024-10-29 DIAGNOSIS — I48.0 PAROXYSMAL A-FIB (HCC): Chronic | ICD-10-CM

## 2024-10-29 DIAGNOSIS — I10 ESSENTIAL HYPERTENSION: ICD-10-CM

## 2024-10-29 DIAGNOSIS — N18.32 TYPE 2 DIABETES MELLITUS WITH STAGE 3B CHRONIC KIDNEY DISEASE, WITHOUT LONG-TERM CURRENT USE OF INSULIN (HCC): ICD-10-CM

## 2024-10-29 DIAGNOSIS — E78.5 DYSLIPIDEMIA: ICD-10-CM

## 2024-10-29 DIAGNOSIS — E53.8 FOLATE DEFICIENCY: ICD-10-CM

## 2024-10-29 DIAGNOSIS — L97.929 VENOUS STASIS ULCERS OF BOTH LOWER EXTREMITIES (HCC): ICD-10-CM

## 2024-10-29 DIAGNOSIS — I73.9 PAD (PERIPHERAL ARTERY DISEASE) (HCC): ICD-10-CM

## 2024-10-29 DIAGNOSIS — N18.32 STAGE 3B CHRONIC KIDNEY DISEASE (HCC): Chronic | ICD-10-CM

## 2024-10-29 DIAGNOSIS — E11.22 TYPE 2 DIABETES MELLITUS WITH STAGE 3B CHRONIC KIDNEY DISEASE, WITHOUT LONG-TERM CURRENT USE OF INSULIN (HCC): Chronic | ICD-10-CM

## 2024-10-29 DIAGNOSIS — N18.32 TYPE 2 DIABETES MELLITUS WITH STAGE 3B CHRONIC KIDNEY DISEASE, WITHOUT LONG-TERM CURRENT USE OF INSULIN (HCC): Chronic | ICD-10-CM

## 2024-10-29 DIAGNOSIS — L97.919 VENOUS STASIS ULCERS OF BOTH LOWER EXTREMITIES (HCC): ICD-10-CM

## 2024-10-29 DIAGNOSIS — L98.9 SKIN LESION: ICD-10-CM

## 2024-10-29 DIAGNOSIS — I48.0 PAROXYSMAL A-FIB (HCC): Primary | ICD-10-CM

## 2024-10-29 DIAGNOSIS — M79.2 NEUROPATHIC PAIN: ICD-10-CM

## 2024-10-29 DIAGNOSIS — E55.9 VITAMIN D DEFICIENCY: ICD-10-CM

## 2024-10-29 DIAGNOSIS — I83.019 VENOUS STASIS ULCERS OF BOTH LOWER EXTREMITIES (HCC): ICD-10-CM

## 2024-10-29 DIAGNOSIS — I83.029 VENOUS STASIS ULCERS OF BOTH LOWER EXTREMITIES (HCC): ICD-10-CM

## 2024-10-29 DIAGNOSIS — E11.22 TYPE 2 DIABETES MELLITUS WITH STAGE 3B CHRONIC KIDNEY DISEASE, WITHOUT LONG-TERM CURRENT USE OF INSULIN (HCC): ICD-10-CM

## 2024-10-29 LAB
ALBUMIN: 3.9 G/DL (ref 3.5–5.2)
ALP BLD-CCNC: 85 U/L (ref 40–129)
ALT SERPL-CCNC: 6 U/L (ref 0–40)
ANION GAP SERPL CALCULATED.3IONS-SCNC: 14 MMOL/L (ref 7–16)
AST SERPL-CCNC: 15 U/L (ref 0–39)
BASOPHILS ABSOLUTE: 0.08 K/UL (ref 0–0.2)
BASOPHILS RELATIVE PERCENT: 1 % (ref 0–2)
BILIRUB SERPL-MCNC: 0.5 MG/DL (ref 0–1.2)
BUN BLDV-MCNC: 43 MG/DL (ref 6–23)
CALCIUM SERPL-MCNC: 10.3 MG/DL (ref 8.6–10.2)
CHLORIDE BLD-SCNC: 104 MMOL/L (ref 98–107)
CHOLESTEROL, TOTAL: 148 MG/DL
CO2: 21 MMOL/L (ref 22–29)
CREAT SERPL-MCNC: 1.8 MG/DL (ref 0.7–1.2)
EOSINOPHILS ABSOLUTE: 0.26 K/UL (ref 0.05–0.5)
EOSINOPHILS RELATIVE PERCENT: 3 % (ref 0–6)
FOLATE: >20 NG/ML (ref 4.8–24.2)
GFR, ESTIMATED: 36 ML/MIN/1.73M2
GLUCOSE BLD-MCNC: 125 MG/DL (ref 74–99)
HBA1C MFR BLD: 7.3 % (ref 4–5.6)
HCT VFR BLD CALC: 40.5 % (ref 37–54)
HDLC SERPL-MCNC: 48 MG/DL
HEMOGLOBIN: 12.8 G/DL (ref 12.5–16.5)
IMMATURE GRANULOCYTES %: 1 % (ref 0–5)
IMMATURE GRANULOCYTES ABSOLUTE: 0.09 K/UL (ref 0–0.58)
LDL CHOLESTEROL: 89 MG/DL
LYMPHOCYTES ABSOLUTE: 1.27 K/UL (ref 1.5–4)
LYMPHOCYTES RELATIVE PERCENT: 13 % (ref 20–42)
MCH RBC QN AUTO: 32.2 PG (ref 26–35)
MCHC RBC AUTO-ENTMCNC: 31.6 G/DL (ref 32–34.5)
MCV RBC AUTO: 101.8 FL (ref 80–99.9)
MONOCYTES ABSOLUTE: 1.01 K/UL (ref 0.1–0.95)
MONOCYTES RELATIVE PERCENT: 10 % (ref 2–12)
NEUTROPHILS ABSOLUTE: 7.48 K/UL (ref 1.8–7.3)
NEUTROPHILS RELATIVE PERCENT: 73 % (ref 43–80)
PDW BLD-RTO: 13.7 % (ref 11.5–15)
PLATELET # BLD: 188 K/UL (ref 130–450)
PMV BLD AUTO: 10.8 FL (ref 7–12)
POTASSIUM SERPL-SCNC: 5.1 MMOL/L (ref 3.5–5)
RBC # BLD: 3.98 M/UL (ref 3.8–5.8)
SODIUM BLD-SCNC: 139 MMOL/L (ref 132–146)
T4 FREE: 1.2 NG/DL (ref 0.9–1.7)
TOTAL PROTEIN: 6.7 G/DL (ref 6.4–8.3)
TRIGL SERPL-MCNC: 54 MG/DL
TSH SERPL DL<=0.05 MIU/L-ACNC: 3.08 UIU/ML (ref 0.27–4.2)
VITAMIN B-12: 419 PG/ML (ref 211–946)
VITAMIN D 25-HYDROXY: 92.7 NG/ML (ref 30–100)
VLDLC SERPL CALC-MCNC: 11 MG/DL
WBC # BLD: 10.2 K/UL (ref 4.5–11.5)

## 2024-10-29 RX ORDER — ATORVASTATIN CALCIUM 40 MG/1
40 TABLET, FILM COATED ORAL DAILY
Qty: 90 TABLET | Refills: 1 | Status: SHIPPED | OUTPATIENT
Start: 2024-10-29

## 2024-10-29 RX ORDER — LISINOPRIL 10 MG/1
10 TABLET ORAL DAILY
Qty: 90 TABLET | Refills: 1 | Status: SHIPPED | OUTPATIENT
Start: 2024-10-29

## 2024-10-29 RX ORDER — FOLIC ACID 1 MG/1
1 TABLET ORAL DAILY
Qty: 90 TABLET | Refills: 1 | Status: SHIPPED | OUTPATIENT
Start: 2024-10-29

## 2024-10-29 ASSESSMENT — ENCOUNTER SYMPTOMS
BLOOD IN STOOL: 0
CHEST TIGHTNESS: 0
SHORTNESS OF BREATH: 0

## 2024-10-29 NOTE — PROGRESS NOTES
25 Hydroxy; Future    5. Type 2 diabetes mellitus with stage 3b chronic kidney disease, without long-term current use of insulin (HCC)  - Comprehensive Metabolic Panel; Future  - CBC with Auto Differential; Future  - Hemoglobin A1C; Future  - Lipid Panel; Future  - TSH; Future  - T4, Free; Future  - Vitamin D 25 Hydroxy; Future    6. PAD (peripheral artery disease) (HCC)  - atorvastatin (LIPITOR) 40 MG tablet; Take 1 tablet by mouth daily  Dispense: 90 tablet; Refill: 1  - Comprehensive Metabolic Panel; Future  - CBC with Auto Differential; Future  - Hemoglobin A1C; Future  - Lipid Panel; Future  - TSH; Future  - T4, Free; Future  - Vitamin D 25 Hydroxy; Future    7. Folate deficiency  - folic acid (FOLVITE) 1 MG tablet; Take 1 tablet by mouth daily  Dispense: 90 tablet; Refill: 1  - Comprehensive Metabolic Panel; Future  - CBC with Auto Differential; Future  - Hemoglobin A1C; Future  - Lipid Panel; Future  - TSH; Future  - T4, Free; Future  - Vitamin D 25 Hydroxy; Future    8. Vitamin D deficiency  - Comprehensive Metabolic Panel; Future  - CBC with Auto Differential; Future  - Hemoglobin A1C; Future  - Lipid Panel; Future  - TSH; Future  - T4, Free; Future  - Vitamin D 25 Hydroxy; Future    Labs from last visit are to be done as soon as possible.  Labs ordered today are to be done 1 week prior to next visit in 6 months    Return in 6 months (on 4/29/2025) for followup and AWV, or sooner if needed, labs 1 wk prior.     Luis E Talley, DO  10/29/24  6:21 PM    There are no Patient Instructions on file for this visit.

## 2024-11-21 DIAGNOSIS — M79.2 NEUROPATHIC PAIN: ICD-10-CM

## 2024-11-21 RX ORDER — GABAPENTIN 300 MG/1
300 CAPSULE ORAL NIGHTLY
Qty: 30 CAPSULE | Refills: 5 | Status: SHIPPED | OUTPATIENT
Start: 2024-11-21 | End: 2025-05-20

## 2024-11-21 NOTE — TELEPHONE ENCOUNTER
Patient's daughter, Aurora, called for a refill.    Name of Medication(s) Requested:  Requested Prescriptions     Pending Prescriptions Disp Refills    gabapentin (NEURONTIN) 300 MG capsule 30 capsule 0     Sig: Take 1 capsule by mouth at bedtime for 30 days.       Medication is on current medication list Yes    Dosage and directions were verified? Yes    Quantity verified: 30 day supply     Pharmacy Verified?  Yes    Last Appointment:  10/29/2024    Future appts:  Future Appointments   Date Time Provider Department Center   4/30/2025 11:00 AM Luis E Talley DO [facility] DEP   4/30/2025 11:15 AM Luis E Talley DO [facility] DEP        (If no appt send self scheduling link. .REFILLAPPT)  Scheduling request sent?     [] Yes  [x] No    Does patient need updated?  [] Yes  [x] No

## 2025-01-21 ENCOUNTER — TELEPHONE (OUTPATIENT)
Dept: FAMILY MEDICINE CLINIC | Age: 89
End: 2025-01-21

## 2025-01-21 DIAGNOSIS — E55.9 VITAMIN D DEFICIENCY: ICD-10-CM

## 2025-01-21 DIAGNOSIS — I10 ESSENTIAL HYPERTENSION: ICD-10-CM

## 2025-01-21 RX ORDER — FUROSEMIDE 20 MG/1
20 TABLET ORAL DAILY
Qty: 90 TABLET | Refills: 1 | Status: SHIPPED | OUTPATIENT
Start: 2025-01-21

## 2025-01-21 NOTE — TELEPHONE ENCOUNTER
Patient's daughter, Mirtha, called for refills.    Name of Medication(s) Requested:  Requested Prescriptions     Pending Prescriptions Disp Refills    furosemide (LASIX) 20 MG tablet 90 tablet 1     Sig: Take 1 tablet by mouth daily    Cholecalciferol 1.25 MG (54985 UT) TABS 12 tablet 0     Sig: Take 1 tablet by mouth once a week       Medication is on current medication list Yes    Dosage and directions were verified? Yes    Quantity verified: 90 day supply     Pharmacy Verified?  Yes    Last Appointment:  10/29/2024    Future appts:  Future Appointments   Date Time Provider Department Center   4/30/2025 11:00 AM Luis E Talley DO [facility] DEP   4/30/2025 11:15 AM Luis E Talley DO [facility] DEP        (If no appt send self scheduling link. .REFILLAPPT)  Scheduling request sent?     [] Yes  [x] No    Does patient need updated?  [] Yes  [x] No

## 2025-01-21 NOTE — TELEPHONE ENCOUNTER
Patient's daughter, Mirtha, called to ask if patient has labs to be done prior to his next appt in April.  I informed her that he has fasting labs which we ask patients to do at least 1 wk prior to the appt.  She stated that her father insists on having them done the same day as his appt and she tries to tell him otherwise, but he will not listen.  I informed her that having labs done the same day will not guarantee results that same day and if the results are not in for the patient's appt, that could affect changes in medication dosages, if applicable.  She stated that she understands since she is a medical provider.  She asked me to send a msg ahead of time, letting Dr. Talley know.     Last seen 10/29/2024  Next appt 4/30/2025

## 2025-01-28 ENCOUNTER — OFFICE VISIT (OUTPATIENT)
Dept: FAMILY MEDICINE CLINIC | Age: 89
End: 2025-01-28

## 2025-01-28 VITALS
BODY MASS INDEX: 26.07 KG/M2 | SYSTOLIC BLOOD PRESSURE: 128 MMHG | OXYGEN SATURATION: 98 % | HEART RATE: 78 BPM | RESPIRATION RATE: 16 BRPM | TEMPERATURE: 97.3 F | DIASTOLIC BLOOD PRESSURE: 68 MMHG | HEIGHT: 69 IN | WEIGHT: 176 LBS

## 2025-01-28 DIAGNOSIS — E55.9 VITAMIN D DEFICIENCY: ICD-10-CM

## 2025-01-28 DIAGNOSIS — L03.119 CELLULITIS OF LOWER EXTREMITY, UNSPECIFIED LATERALITY: Primary | ICD-10-CM

## 2025-01-28 RX ORDER — DOXYCYCLINE HYCLATE 100 MG
100 TABLET ORAL 2 TIMES DAILY
Qty: 20 TABLET | Refills: 0 | Status: SHIPPED | OUTPATIENT
Start: 2025-01-28 | End: 2025-02-07

## 2025-01-28 ASSESSMENT — PATIENT HEALTH QUESTIONNAIRE - PHQ9
SUM OF ALL RESPONSES TO PHQ QUESTIONS 1-9: 0
SUM OF ALL RESPONSES TO PHQ9 QUESTIONS 1 & 2: 0
SUM OF ALL RESPONSES TO PHQ QUESTIONS 1-9: 0
SUM OF ALL RESPONSES TO PHQ QUESTIONS 1-9: 0
2. FEELING DOWN, DEPRESSED OR HOPELESS: NOT AT ALL
SUM OF ALL RESPONSES TO PHQ QUESTIONS 1-9: 0
1. LITTLE INTEREST OR PLEASURE IN DOING THINGS: NOT AT ALL

## 2025-01-28 NOTE — PROGRESS NOTES
Artificial Intelligence will be utilized during this visit to record, process the conversation to generate a clinical note, and support improvement of the AI technology. The patient (or guardian, if applicable) and other individuals in attendance at the appointment consented to the use of AI, including the recording.

## 2025-02-25 ENCOUNTER — TELEPHONE (OUTPATIENT)
Dept: FAMILY MEDICINE CLINIC | Age: 89
End: 2025-02-25

## 2025-02-25 DIAGNOSIS — L97.901 VENOUS STASIS ULCER LIMITED TO BREAKDOWN OF SKIN WITHOUT VARICOSE VEINS, UNSPECIFIED SITE (HCC): ICD-10-CM

## 2025-02-25 DIAGNOSIS — L03.119 CELLULITIS OF LOWER EXTREMITY, UNSPECIFIED LATERALITY: Primary | ICD-10-CM

## 2025-02-25 DIAGNOSIS — I87.2 VENOUS STASIS ULCER LIMITED TO BREAKDOWN OF SKIN WITHOUT VARICOSE VEINS, UNSPECIFIED SITE (HCC): ICD-10-CM

## 2025-02-25 NOTE — TELEPHONE ENCOUNTER
Legs were doing better but getting worse again; wants ATX called in and referral to wound clinic not in [place]

## 2025-02-26 RX ORDER — DOXYCYCLINE HYCLATE 100 MG
100 TABLET ORAL 2 TIMES DAILY
Qty: 20 TABLET | Refills: 0 | Status: SHIPPED | OUTPATIENT
Start: 2025-02-26 | End: 2025-03-08

## 2025-03-11 ENCOUNTER — HOSPITAL ENCOUNTER (OUTPATIENT)
Dept: WOUND CARE | Age: 89
Discharge: HOME | End: 2025-03-11
Attending: PODIATRIST
Payer: MEDICARE

## 2025-03-11 VITALS
HEART RATE: 59 BPM | DIASTOLIC BLOOD PRESSURE: 48 MMHG | SYSTOLIC BLOOD PRESSURE: 126 MMHG | TEMPERATURE: 97.1 F | BODY MASS INDEX: 25.98 KG/M2 | WEIGHT: 176 LBS | RESPIRATION RATE: 18 BRPM

## 2025-03-11 DIAGNOSIS — Z71.3 DIETARY COUNSELING: ICD-10-CM

## 2025-03-11 DIAGNOSIS — Z72.9 PROBLEM RELATED TO LIFESTYLE: ICD-10-CM

## 2025-03-11 DIAGNOSIS — I73.9 PERIPHERAL VASCULAR DISEASE, UNSPECIFIED: Primary | ICD-10-CM

## 2025-03-11 DIAGNOSIS — S81.819A SKIN TEAR OF LOWER LEG WITHOUT COMPLICATION, INITIAL ENCOUNTER: ICD-10-CM

## 2025-03-11 DIAGNOSIS — Z71.1 FEARED COMPLAINT WITHOUT DIAGNOSIS: ICD-10-CM

## 2025-03-11 DIAGNOSIS — I87.2 VENOUS INSUFFICIENCY OF BOTH LOWER EXTREMITIES: ICD-10-CM

## 2025-03-11 DIAGNOSIS — R60.0 PERIPHERAL EDEMA: ICD-10-CM

## 2025-03-11 DIAGNOSIS — Z71.89 COUNSELING ON INJURY PREVENTION: ICD-10-CM

## 2025-03-11 PROCEDURE — 87070 CULTURE OTHR SPECIMN AEROBIC: CPT

## 2025-03-11 PROCEDURE — 99213 OFFICE O/P EST LOW 20 MIN: CPT

## 2025-03-11 PROCEDURE — 87205 SMEAR GRAM STAIN: CPT

## 2025-03-11 PROCEDURE — 87077 CULTURE AEROBIC IDENTIFY: CPT

## 2025-03-11 ASSESSMENT — PAIN DESCRIPTION - FREQUENCY: FREQUENCY: INTERMITTENT

## 2025-03-11 ASSESSMENT — PAIN DESCRIPTION - PAIN TYPE: TYPE: ACUTE PAIN

## 2025-03-11 ASSESSMENT — PAIN DESCRIPTION - ORIENTATION: ORIENTATION: RIGHT

## 2025-03-11 ASSESSMENT — PAIN DESCRIPTION - DESCRIPTORS: DESCRIPTORS: DISCOMFORT

## 2025-03-11 ASSESSMENT — PAIN DESCRIPTION - ONSET: ONSET: ON-GOING

## 2025-03-11 ASSESSMENT — PAIN SCALES - GENERAL: PAINLEVEL_OUTOF10: 5

## 2025-03-11 ASSESSMENT — PAIN DESCRIPTION - LOCATION: LOCATION: LEG

## 2025-03-11 ASSESSMENT — PAIN - FUNCTIONAL ASSESSMENT: PAIN_FUNCTIONAL_ASSESSMENT: PREVENTS OR INTERFERES SOME ACTIVE ACTIVITIES AND ADLS

## 2025-03-11 NOTE — DISCHARGE INSTRUCTIONS
Visit Discharge/Physician Orders    Discharge condition: Stable    Assessment of pain at discharge: yes    Anesthetic used: 4%    Discharge to: Home    Left via:Private automobile    Accompanied by: accompanied by child    ECF/HHA: referral sent to [place]    Dressing Orders: Cleanse wound to right and left leg with normal saline, apply alginate to wound bed and cover with ABD pad and wrap with coban, change three times weekly (tues in clinic, thurs and sat, if possible). Please wash legs with baby shampoo.    Keep wrap dry at all times. If wrap becomes wet or falls 2 inches call [facility] ([phone])and may remove wrap and apply double tubigrip.     Treatment Orders:  EAT DIET WITH PROTEINS AND VITAMIN C  TAKE A MULTIVITAMIN DAILY IF NOT CONTRAINDICATED      [facility] followup visit ____________1 week_________________  (Please note your next appointment above and if you are unable to keep, kindly give a 24 hour notice. Thank you.)    Physician signature:__________________________      If you experience any of the following, please call the Wound Care Center during business hours:    * Increase in Pain  * Temperature over 101  * Increase in drainage from your wound  * Drainage with a foul odor  * Bleeding  * Increase in swelling  * Need for compression bandage changes due to slippage, breakthrough drainage.    If you need medical attention outside of the business hours of the Wound Care Centers please contact your PCP or go to the nearest emergency room.

## 2025-03-11 NOTE — PROGRESS NOTES
Wound Care Request for Home Health      Home Health Company:     [employer] [facility] [phone]    Ordering Center:     [address] [address]  Telephone: [phone]       FAX [phone]    Patient Information:      Omar Zayas  [address]  [place] [address]   PLEASE CONTACT DESHAWN(DAUGHTER) [phone]  : 3/17/1934  AGE: 89 y.o.     GENDER: male   EPISODE DATE: 3/11/2025    Insurance:      PRIMARY INSURANCE:  Plan: BCBS [place] M[employer]  Coverage: [employer]  Effective Date: 2024  Group Number: [unfilled]  Subscriber Number: [id number] - (Medicare Managed)    Payer/Plan Subscr  Sex Relation Sub. Ins. ID Effective Group Num   1. [employer]* OMAR ZAYAS 3/17/1934 Male Self [id number]                                    [id number]       Patient Wound Information:      Problem List Items Addressed This Visit          Circulatory    Peripheral vascular disease, unspecified - Primary    Venous insufficiency of both lower extremities       Musculoskeletal and Integument    Skin tear of lower leg without complication, initial encounter       Other    Feared complaint without diagnosis    Peripheral edema    Dietary counseling    Problem related to lifestyle    Counseling on injury prevention       Incision 17 Chest Left (Active)   Number of days: 2905       Wound 22 Wrist Left;Dorsal (Active)   Number of days: 932       Wound 22 Leg Right;Lower (Active)   Number of days: 929       Wound 25 Leg Right;Lower #1 (Active)   Wound Image   25 1113   Wound Length (cm) 20.5 cm 25 1113   Wound Width (cm) 17 cm 25 1113   Wound Depth (cm) 0.1 cm 25 1113   Wound Surface Area (cm^2) 348.5 cm^2 25 1113   Wound Volume (cm^3) 34.85 cm^3 25 1113   Post-Procedure Length (cm) 20.6 cm 25 1135   Post-Procedure Width (cm) 17.1 cm 25 1135   Post-Procedure

## 2025-03-13 LAB
MICROORGANISM SPEC CULT: NORMAL
MICROORGANISM SPEC CULT: NORMAL
MICROORGANISM/AGENT SPEC: NORMAL
SERVICE CMNT-IMP: NORMAL
SPECIMEN DESCRIPTION: NORMAL

## 2025-03-14 NOTE — DISCHARGE INSTRUCTIONS
Visit Discharge/Physician Orders     Discharge condition: Stable     Assessment of pain at discharge: yes     Anesthetic used: 4%     Discharge to: Home     Left via:Private automobile     Accompanied by: accompanied by child     ECF/HHA:      Dressing Orders: healed     Treatment Orders:  EAT DIET WITH PROTEINS AND VITAMIN C  TAKE A MULTIVITAMIN DAILY IF NOT CONTRAINDICATED       [facility] followup visit ____________call as needed________________  (Please note your next appointment above and if you are unable to keep, kindly give a 24 hour notice. Thank you.)     Physician signature:__________________________        If you experience any of the following, please call the Wound Care Center during business hours:     * Increase in Pain  * Temperature over 101  * Increase in drainage from your wound  * Drainage with a foul odor  * Bleeding  * Increase in swelling  * Need for compression bandage changes due to slippage, breakthrough drainage.     If you need medical attention outside of the business hours of the Wound Care Centers please contact your PCP or go to the nearest emergency room.

## 2025-03-18 ENCOUNTER — HOSPITAL ENCOUNTER (OUTPATIENT)
Dept: WOUND CARE | Age: 89
Discharge: HOME | End: 2025-03-18
Attending: PODIATRIST
Payer: MEDICARE

## 2025-03-18 VITALS
HEART RATE: 64 BPM | DIASTOLIC BLOOD PRESSURE: 62 MMHG | TEMPERATURE: 97.6 F | WEIGHT: 176 LBS | HEIGHT: 69 IN | RESPIRATION RATE: 18 BRPM | SYSTOLIC BLOOD PRESSURE: 122 MMHG | BODY MASS INDEX: 26.07 KG/M2

## 2025-03-18 DIAGNOSIS — Z71.89 COUNSELING ON INJURY PREVENTION: ICD-10-CM

## 2025-03-18 DIAGNOSIS — I73.9 PERIPHERAL VASCULAR DISEASE, UNSPECIFIED: Primary | ICD-10-CM

## 2025-03-18 DIAGNOSIS — Z71.3 DIETARY COUNSELING: ICD-10-CM

## 2025-03-18 DIAGNOSIS — Z71.1 FEARED COMPLAINT WITHOUT DIAGNOSIS: ICD-10-CM

## 2025-03-18 DIAGNOSIS — Z72.9 PROBLEM RELATED TO LIFESTYLE: ICD-10-CM

## 2025-03-18 DIAGNOSIS — E11.628 TYPE 2 DIABETES MELLITUS WITH OTHER SKIN COMPLICATION, WITHOUT LONG-TERM CURRENT USE OF INSULIN: ICD-10-CM

## 2025-03-18 DIAGNOSIS — R60.0 PERIPHERAL EDEMA: ICD-10-CM

## 2025-03-18 DIAGNOSIS — I87.2 VENOUS INSUFFICIENCY OF BOTH LOWER EXTREMITIES: ICD-10-CM

## 2025-03-18 DIAGNOSIS — S81.819A SKIN TEAR OF LOWER LEG WITHOUT COMPLICATION, INITIAL ENCOUNTER: ICD-10-CM

## 2025-03-18 PROCEDURE — 99212 OFFICE O/P EST SF 10 MIN: CPT

## 2025-03-18 NOTE — PLAN OF CARE
Problem: Pain  Goal: Verbalizes/displays adequate comfort level or baseline comfort level  3/18/2025 1107 by Liudmila Montague RN  Outcome: Completed  3/18/2025 1003 by Liudmila Montague RN  Outcome: Progressing     Problem: Cognitive:  Goal: Knowledge of wound care  Description: Knowledge of wound care  3/18/2025 1107 by Liudmila Montague RN  Outcome: Completed  3/18/2025 1003 by Liudmila Montague RN  Outcome: Progressing  Goal: Understands risk factors for wounds  Description: Understands risk factors for wounds  3/18/2025 1107 by Liudmila Montague RN  Outcome: Completed  3/18/2025 1003 by Liudmila Montague RN  Outcome: Progressing     Problem: Wound:  Goal: Will show signs of wound healing; wound closure and no evidence of infection  Description: Will show signs of wound healing; wound closure and no evidence of infection  3/18/2025 1107 by Liudmila Montague RN  Outcome: Completed  3/18/2025 1003 by Liudmila Montague RN  Outcome: Progressing     Problem: Venous:  Goal: Signs of wound healing will improve  Description: Signs of wound healing will improve  3/18/2025 1107 by Liudmila Montague RN  Outcome: Completed  3/18/2025 1003 by Liudmila Montague RN  Outcome: Progressing

## 2025-05-02 DIAGNOSIS — I73.9 PAD (PERIPHERAL ARTERY DISEASE): ICD-10-CM

## 2025-05-02 DIAGNOSIS — E53.8 FOLATE DEFICIENCY: ICD-10-CM

## 2025-05-02 RX ORDER — ATORVASTATIN CALCIUM 40 MG/1
40 TABLET, FILM COATED ORAL DAILY
Qty: 90 TABLET | Refills: 0 | Status: SHIPPED | OUTPATIENT
Start: 2025-05-02

## 2025-05-02 RX ORDER — FOLIC ACID 1 MG/1
1000 TABLET ORAL DAILY
Qty: 90 TABLET | Refills: 0 | Status: SHIPPED | OUTPATIENT
Start: 2025-05-02

## 2025-05-02 NOTE — TELEPHONE ENCOUNTER
Name of Medication(s) Requested:  Requested Prescriptions     Pending Prescriptions Disp Refills    atorvastatin (LIPITOR) 40 MG tablet [Pharmacy Med Name: Atorvastatin Calcium Oral Tablet 40 MG] 90 tablet 0     Sig: TAKE ONE TABLET BY MOUTH DAILY    folic acid (FOLVITE) 1 MG tablet [Pharmacy Med Name: Folic Acid Oral Tablet 1 MG] 90 tablet 0     Sig: TAKE ONE TABLET BY MOUTH DAILY       Medication is on current medication list Yes    Dosage and directions were verified? Yes    Quantity verified: 90 day supply     Pharmacy Verified?  Yes    Last Appointment:  1/28/2025    Future appts:  Future Appointments   Date Time Provider Department Center   5/6/2025 10:15 AM Brady Boucher DPM [facility] [place]   6/17/2025  3:00 PM Luis E Talley DO [facility] DEP        (If no appt send self scheduling link. .REFILLAPPT)  Scheduling request sent?     [] Yes  [x] No    Does patient need updated?  [] Yes  [x] No

## 2025-05-06 ENCOUNTER — HOSPITAL ENCOUNTER (OUTPATIENT)
Dept: WOUND CARE | Age: 89
Discharge: HOME | End: 2025-05-06
Payer: MEDICARE

## 2025-05-06 VITALS
HEIGHT: 69 IN | TEMPERATURE: 97.3 F | DIASTOLIC BLOOD PRESSURE: 74 MMHG | WEIGHT: 190 LBS | RESPIRATION RATE: 18 BRPM | SYSTOLIC BLOOD PRESSURE: 132 MMHG | HEART RATE: 88 BPM | BODY MASS INDEX: 28.14 KG/M2

## 2025-05-06 DIAGNOSIS — I89.0 LYMPHEDEMA: Primary | ICD-10-CM

## 2025-05-06 DIAGNOSIS — I87.2 VENOUS INSUFFICIENCY OF BOTH LOWER EXTREMITIES: ICD-10-CM

## 2025-05-06 DIAGNOSIS — I87.333 CHRONIC VENOUS HTN W ULCER AND INFLAM OF BILATERAL LOW EXTRM (HCC): ICD-10-CM

## 2025-05-06 PROCEDURE — 11042 DBRDMT SUBQ TIS 1ST 20SQCM/<: CPT

## 2025-05-06 PROCEDURE — 99213 OFFICE O/P EST LOW 20 MIN: CPT

## 2025-05-06 ASSESSMENT — PAIN DESCRIPTION - LOCATION: LOCATION: LEG

## 2025-05-06 ASSESSMENT — PAIN DESCRIPTION - DESCRIPTORS: DESCRIPTORS: ACHING

## 2025-05-06 ASSESSMENT — PAIN DESCRIPTION - ORIENTATION: ORIENTATION: LEFT

## 2025-05-06 ASSESSMENT — PAIN SCALES - GENERAL: PAINLEVEL_OUTOF10: 6

## 2025-05-06 NOTE — DISCHARGE INSTRUCTIONS
Visit Discharge/Physician Orders    Discharge condition: Stable    Assessment of pain at discharge: moderate    Anesthetic used: 4%lidocaine    Discharge to: Home    Left via:Private automobile    Accompanied by: family    ECF/HHA: [facility]- updated orders    Dressing Orders: Right and left legs: cleanse wounds with normal saline, apply plain alginate and cover with ABD's. Secure with kerlix and tape. Change 3 times weekly.    Tubigrips to both legs- apply in the morning and remove at night.    Treatment Orders: Elevate legs above heart level as much as possible.    [facility] followup visit ________1 week_____________________  (Please note your next appointment above and if you are unable to keep, kindly give a 24 hour notice. Thank you.)    Physician signature:__________________________      If you experience any of the following, please call the Wound Care Center during business hours:    * Increase in Pain  * Temperature over 101  * Increase in drainage from your wound  * Drainage with a foul odor  * Bleeding  * Increase in swelling  * Need for compression bandage changes due to slippage, breakthrough drainage.    If you need medical attention outside of the business hours of the Wound Care Centers please contact your PCP or go to the nearest emergency room.

## 2025-05-06 NOTE — PROGRESS NOTES
Wound Healing Center Followup Visit Note    Referring Physician : Luis E Talley DO  Omar Zayas  MEDICAL RECORD NUMBER:  [id number]  AGE: 89 y.o.   GENDER: male  : 3/17/1934  EPISODE DATE:  2025    Subjective:     Chief Complaint   Patient presents with    Wound Check     Bilateral legs      HISTORY of PRESENT ILLNESS HPI   Omar Zayas is a 89 y.o. male who presents today in regards to follow up evaluation and treatment of wound/ulcer.  That patient's past medical, family and social hx were reviewed and changes were made if present.    History of Wound Context:  Patient with intermittent leg blisters that turn into wounds for approximately 6 months. Patient has edema and is currently on water pills. Patient is accompanied by son.    3/11/25: Patient with b/l VLU, dressings per nursing orders. Patient to wear compression sleeves. Apply in AM remove before bed. Patient to f/u 1 week     3/18/25: patient ulceratin healed without signs of infection. F/u prn    25: Patient with b/l VLU, dressings per nursing orders. Patient to wear compression sleeves. Apply in AM remove before bed. Patient to f/u 1 week    Wound/Ulcer Pain Timing/Severity: none  Quality of pain: N/A  Severity:  0 / 10   Modifying Factors: None  Associated Signs/Symptoms: edema    Ulcer Identification:  Ulcer Type: venous  Contributing Factors: edema and lymphedema    Diabetic/Pressure/Non Pressure Ulcers only:  Ulcer: Non-Pressure ulcer, fat layer exposed    Wound: N/A        PAST MEDICAL HISTORY      Diagnosis Date    A-fib (HCC)     Anticoagulant long-term use     Atrial fibrillation (HCC)     Blister of left leg without infection 2022    BPH (benign prostatic hyperplasia)     CAD (coronary artery disease)     Cataract of left eye 2014    Cerebrovascular disease     CHF (congestive heart failure) (HCC)     Diabetes mellitus (HCC)     Dislocated IOL (intraocular lens), posterior 11/15/2013    Hearing loss     Hip 
Full thickness 05/06/25 1037   Number of days: 0       Wound 05/06/25 Leg Right;Lateral;Lower #2 (Active)   Wound Image   05/06/25 1037   Wound Etiology Venous 05/06/25 1037   Dressing Status New dressing applied 05/06/25 1109   Wound Cleansed Cleansed with saline 05/06/25 1109   Dressing/Treatment Alginate;ABD;Roll gauze 05/06/25 1109   Wound Length (cm) 1.6 cm 05/06/25 1037   Wound Width (cm) 0.9 cm 05/06/25 1037   Wound Depth (cm) 0.1 cm 05/06/25 1037   Wound Surface Area (cm^2) 1.44 cm^2 05/06/25 1037   Wound Volume (cm^3) 0.144 cm^3 05/06/25 1037   Wound Assessment Fibrin;Dry 05/06/25 1037   Drainage Amount None (dry) 05/06/25 1037   Odor None 05/06/25 1037   Hansa-wound Assessment Dry/flaky 05/06/25 1037   Margins Attached edges 05/06/25 1037   Wound Thickness Description not for Pressure Injury Full thickness 05/06/25 1037   Number of days: 0       Wound 05/06/25 Leg Left;Lower;Lateral #3 (Active)   Wound Image   05/06/25 1037   Wound Etiology Venous 05/06/25 1037   Dressing Status New dressing applied 05/06/25 1109   Wound Cleansed Cleansed with saline 05/06/25 1109   Dressing/Treatment ABD;Alginate;Roll gauze 05/06/25 1109   Wound Length (cm) 5.5 cm 05/06/25 1037   Wound Width (cm) 3.4 cm 05/06/25 1037   Wound Depth (cm) 0.1 cm 05/06/25 1037   Wound Surface Area (cm^2) 18.7 cm^2 05/06/25 1037   Wound Volume (cm^3) 1.87 cm^3 05/06/25 1037   Wound Assessment Dry;Fibrin 05/06/25 1037   Drainage Amount Small (< 25%) 05/06/25 1037   Drainage Description Serosanguinous 05/06/25 1037   Odor None 05/06/25 1037   Hansa-wound Assessment Fragile 05/06/25 1037   Margins Attached edges 05/06/25 1037   Wound Thickness Description not for Pressure Injury Full thickness 05/06/25 1037   Number of days: 0          Visit Discharge/Physician Orders    Discharge condition: Stable    Assessment of pain at discharge: moderate    Anesthetic used: 4%lidocaine    Discharge to: Home    Left via:Private automobile    Accompanied by:

## 2025-05-12 NOTE — DISCHARGE INSTRUCTIONS
Visit Discharge/Physician Orders     Discharge condition: Stable     Assessment of pain at discharge: moderate     Anesthetic used: 4%lidocaine     Discharge to: Home     Left via:Private automobile     Accompanied by: family     ECF/HHA: [facility]- updated orders     Dressing Orders: Right and left legs: cleanse wounds with normal saline, apply plain alginate and cover with ABD's. Secure with kerlix and tape. Change 3 times weekly.     Tubigrips to both legs- apply in the morning and remove at night.     Treatment Orders: Elevate legs above heart level as much as possible.     [facility] followup visit ________1 week_____________________  (Please note your next appointment above and if you are unable to keep, kindly give a 24 hour notice. Thank you.)     Physician signature:__________________________        If you experience any of the following, please call the Wound Care Center during business hours:     * Increase in Pain  * Temperature over 101  * Increase in drainage from your wound  * Drainage with a foul odor  * Bleeding  * Increase in swelling  * Need for compression bandage changes due to slippage, breakthrough drainage.     If you need medical attention outside of the business hours of the Wound Care Centers please contact your PCP or go to the nearest emergency room.

## 2025-05-13 ENCOUNTER — HOSPITAL ENCOUNTER (OUTPATIENT)
Dept: WOUND CARE | Age: 89
Discharge: HOME | End: 2025-05-13
Attending: PODIATRIST | Admitting: PODIATRIST
Payer: MEDICARE

## 2025-05-13 VITALS
WEIGHT: 185 LBS | DIASTOLIC BLOOD PRESSURE: 55 MMHG | HEART RATE: 62 BPM | RESPIRATION RATE: 18 BRPM | HEIGHT: 69 IN | SYSTOLIC BLOOD PRESSURE: 116 MMHG | TEMPERATURE: 96.9 F | BODY MASS INDEX: 27.4 KG/M2

## 2025-05-13 DIAGNOSIS — L97.912 NON-PRESSURE CHRONIC ULCER OF RIGHT LOWER LEG WITH FAT LAYER EXPOSED (HCC): ICD-10-CM

## 2025-05-13 DIAGNOSIS — I87.333 CHRONIC VENOUS HTN W ULCER AND INFLAM OF BILATERAL LOW EXTRM (HCC): Primary | ICD-10-CM

## 2025-05-13 PROCEDURE — 11042 DBRDMT SUBQ TIS 1ST 20SQCM/<: CPT

## 2025-05-13 RX ORDER — LIDOCAINE HYDROCHLORIDE 40 MG/ML
SOLUTION TOPICAL ONCE
Status: COMPLETED | OUTPATIENT
Start: 2025-05-13 | End: 2025-05-13

## 2025-05-13 RX ADMIN — LIDOCAINE HYDROCHLORIDE 10 ML: 40 SOLUTION TOPICAL at 09:57

## 2025-05-13 NOTE — PROGRESS NOTES
Wound Healing Center Followup Visit Note    Referring Physician : Luis E Talley DO  Oamr Zayas  MEDICAL RECORD NUMBER:  [id number]  AGE: 89 y.o.   GENDER: male  : 3/17/1934  EPISODE DATE:  2025    Subjective:     Chief Complaint   Patient presents with    Wound Check      HISTORY of PRESENT ILLNESS HPI   Omar Zayas is a 89 y.o. male who presents today in regards to follow up evaluation and treatment of wound/ulcer.  That patient's past medical, family and social hx were reviewed and changes were made if present.    History of Wound Context:  Patient with intermittent leg blisters that turn into wounds for approximately 6 months. Patient has edema and is currently on water pills. Patient is accompanied by son.    25: Patient with b/l VLU, improving. Left leg healed. dressings per nursing orders. Patient to wear compression sleeves. Apply in AM remove before bed. Patient to f/u 1 week     Wound/Ulcer Pain Timing/Severity: none  Quality of pain: N/A  Severity:  0 / 10   Modifying Factors: None  Associated Signs/Symptoms: edema    Ulcer Identification:  Ulcer Type: venous  Contributing Factors: edema and lymphedema    Diabetic/Pressure/Non Pressure Ulcers only:  Ulcer: Non-Pressure ulcer, fat layer exposed    Wound: N/A        PAST MEDICAL HISTORY      Diagnosis Date    A-fib ([facility])     Anticoagulant long-term use     Atrial fibrillation (HCC)     Blister of left leg without infection 2022    BPH (benign prostatic hyperplasia)     CAD (coronary artery disease)     Cataract of left eye 2014    Cerebrovascular disease     CHF (congestive heart failure) ([facility])     Diabetes mellitus ([facility])     Dislocated IOL (intraocular lens), posterior 11/15/2013    Hearing loss     Hip fracture requiring operative repair, left, closed, initial encounter ([facility]) 2022    Hyperlipidemia     Hypertension     MRSA infection greater than 3 months ago 2024    Lower leg wounds    Osteoarthritis

## 2025-05-23 NOTE — DISCHARGE INSTRUCTIONS
Visit Discharge/Physician Orders     Discharge condition: Stable     Assessment of pain at discharge: moderate     Anesthetic used: 4%lidocaine     Discharge to: Home     Left via:Private automobile     Accompanied by: family     ECF/HHA: [facility] health-      Dressing Orders: Right leg cleanse wounds with normal saline, apply plain alginate and cover with ABD's. Secure with kerlix and tape. Change 3 times weekly.     Tubigrips to both legs- apply in the morning and remove at night.     Treatment Orders: Elevate legs above heart level as much as possible.     [facility] followup visit ________2 weeks_____________________  (Please note your next appointment above and if you are unable to keep, kindly give a 24 hour notice. Thank you.)     Physician signature:__________________________        If you experience any of the following, please call the Wound Care Center during business hours:     * Increase in Pain  * Temperature over 101  * Increase in drainage from your wound  * Drainage with a foul odor  * Bleeding  * Increase in swelling  * Need for compression bandage changes due to slippage, breakthrough drainage.     If you need medical attention outside of the business hours of the Wound Care Centers please contact your PCP or go to the nearest emergency room.

## 2025-05-27 ENCOUNTER — HOSPITAL ENCOUNTER (OUTPATIENT)
Dept: WOUND CARE | Age: 89
Discharge: HOME | End: 2025-05-27
Attending: PODIATRIST | Admitting: PODIATRIST
Payer: MEDICARE

## 2025-05-27 VITALS
DIASTOLIC BLOOD PRESSURE: 77 MMHG | HEART RATE: 60 BPM | SYSTOLIC BLOOD PRESSURE: 161 MMHG | RESPIRATION RATE: 18 BRPM | TEMPERATURE: 98.1 F

## 2025-05-27 DIAGNOSIS — L97.912 NON-PRESSURE CHRONIC ULCER OF RIGHT LOWER LEG WITH FAT LAYER EXPOSED (HCC): Primary | ICD-10-CM

## 2025-05-27 PROCEDURE — 11042 DBRDMT SUBQ TIS 1ST 20SQCM/<: CPT

## 2025-05-27 ASSESSMENT — PAIN DESCRIPTION - ONSET: ONSET: ON-GOING

## 2025-05-27 ASSESSMENT — PAIN DESCRIPTION - PAIN TYPE: TYPE: CHRONIC PAIN

## 2025-05-27 ASSESSMENT — PAIN DESCRIPTION - FREQUENCY: FREQUENCY: INTERMITTENT

## 2025-05-27 ASSESSMENT — PAIN DESCRIPTION - ORIENTATION: ORIENTATION: RIGHT

## 2025-05-27 ASSESSMENT — PAIN - FUNCTIONAL ASSESSMENT: PAIN_FUNCTIONAL_ASSESSMENT: PREVENTS OR INTERFERES SOME ACTIVE ACTIVITIES AND ADLS

## 2025-05-27 ASSESSMENT — PAIN DESCRIPTION - DESCRIPTORS: DESCRIPTORS: DISCOMFORT

## 2025-05-27 ASSESSMENT — PAIN DESCRIPTION - LOCATION: LOCATION: LEG

## 2025-05-27 ASSESSMENT — PAIN SCALES - GENERAL: PAINLEVEL_OUTOF10: 4

## 2025-05-27 NOTE — PROGRESS NOTES
Wound Healing Center Followup Visit Note    Referring Physician : Luis E Talley DO  Omar Zayas  MEDICAL RECORD NUMBER:  [id number]  AGE: 89 y.o.   GENDER: male  : 3/17/1934  EPISODE DATE:  2025    Subjective:     Chief Complaint   Patient presents with    Wound Check      HISTORY of PRESENT ILLNESS HPI   Omar Zayas is a 89 y.o. male who presents today in regards to follow up evaluation and treatment of wound/ulcer.  That patient's past medical, family and social hx were reviewed and changes were made if present.    History of Wound Context:  Patient with intermittent leg blisters that turn into wounds for approximately 6 months. Patient has edema and is currently on water pills. Patient is accompanied by son.    25: Patient with b/l VLU, improving. Left leg healed. dressings per nursing orders. Patient to wear compression sleeves. Apply in AM remove before bed. Patient to f/u 1 week     25: Patient with b/l VLU, improving. Left leg healed. dressings per nursing orders. Patient to wear compression sleeves. Apply in AM remove before bed. Patient to f/u 1 week     Wound/Ulcer Pain Timing/Severity: none  Quality of pain: N/A  Severity:  0 / 10   Modifying Factors: None  Associated Signs/Symptoms: edema    Ulcer Identification:  Ulcer Type: venous  Contributing Factors: edema and lymphedema    Diabetic/Pressure/Non Pressure Ulcers only:  Ulcer: Non-Pressure ulcer, fat layer exposed    Wound: N/A        PAST MEDICAL HISTORY      Diagnosis Date    A-fib (HCC)     Anticoagulant long-term use     Atrial fibrillation (HCC)     Blister of left leg without infection 2022    BPH (benign prostatic hyperplasia)     CAD (coronary artery disease)     Cataract of left eye 2014    Cerebrovascular disease     CHF (congestive heart failure) (HCC)     Diabetes mellitus (HCC)     Dislocated IOL (intraocular lens), posterior 11/15/2013    Hearing loss     Hip fracture requiring operative repair,

## 2025-06-06 NOTE — DISCHARGE INSTRUCTIONS
Visit Discharge/Physician Orders     Discharge condition: Stable     Assessment of pain at discharge: moderate     Anesthetic used: 4%lidocaine     Discharge to: Home     Left via:Private automobile     Accompanied by: family     ECF/HHA: [facility]- new orders     Dressing Orders: Left foot: cleanse wound with normal saline, apply plain alginate and cover with ABD's. Secure with kerlix and tape. Change 3 times weekly.     Tubigrips to both legs- apply in the morning and remove at night.     Treatment Orders: Elevate legs above heart level as much as possible.     [facility] followup visit ________2 weeks_____________________  (Please note your next appointment above and if you are unable to keep, kindly give a 24 hour notice. Thank you.)     Physician signature:__________________________        If you experience any of the following, please call the Wound Care Center during business hours:     * Increase in Pain  * Temperature over 101  * Increase in drainage from your wound  * Drainage with a foul odor  * Bleeding  * Increase in swelling  * Need for compression bandage changes due to slippage, breakthrough drainage.     If you need medical attention outside of the business hours of the Wound Care Centers please contact your PCP or go to the nearest emergency room.

## 2025-06-17 ENCOUNTER — OFFICE VISIT (OUTPATIENT)
Dept: FAMILY MEDICINE CLINIC | Age: 89
End: 2025-06-17
Payer: MEDICARE

## 2025-06-17 ENCOUNTER — HOSPITAL ENCOUNTER (OUTPATIENT)
Dept: WOUND CARE | Age: 89
Discharge: HOME | End: 2025-06-17
Attending: PODIATRIST | Admitting: PODIATRIST
Payer: MEDICARE

## 2025-06-17 VITALS
TEMPERATURE: 98.4 F | SYSTOLIC BLOOD PRESSURE: 131 MMHG | HEIGHT: 69 IN | HEART RATE: 56 BPM | DIASTOLIC BLOOD PRESSURE: 90 MMHG | WEIGHT: 185 LBS | BODY MASS INDEX: 27.4 KG/M2 | RESPIRATION RATE: 16 BRPM | OXYGEN SATURATION: 93 %

## 2025-06-17 VITALS
TEMPERATURE: 97.2 F | WEIGHT: 185 LBS | HEART RATE: 60 BPM | HEIGHT: 69 IN | BODY MASS INDEX: 27.4 KG/M2 | DIASTOLIC BLOOD PRESSURE: 71 MMHG | RESPIRATION RATE: 18 BRPM | SYSTOLIC BLOOD PRESSURE: 146 MMHG

## 2025-06-17 DIAGNOSIS — E53.8 FOLATE DEFICIENCY: ICD-10-CM

## 2025-06-17 DIAGNOSIS — E11.22 TYPE 2 DIABETES MELLITUS WITH STAGE 3B CHRONIC KIDNEY DISEASE, WITHOUT LONG-TERM CURRENT USE OF INSULIN (HCC): ICD-10-CM

## 2025-06-17 DIAGNOSIS — I48.0 PAROXYSMAL A-FIB (HCC): ICD-10-CM

## 2025-06-17 DIAGNOSIS — Z00.00 MEDICARE ANNUAL WELLNESS VISIT, SUBSEQUENT: Primary | ICD-10-CM

## 2025-06-17 DIAGNOSIS — E55.9 VITAMIN D DEFICIENCY: ICD-10-CM

## 2025-06-17 DIAGNOSIS — E78.5 DYSLIPIDEMIA: ICD-10-CM

## 2025-06-17 DIAGNOSIS — N18.32 TYPE 2 DIABETES MELLITUS WITH STAGE 3B CHRONIC KIDNEY DISEASE, WITHOUT LONG-TERM CURRENT USE OF INSULIN (HCC): ICD-10-CM

## 2025-06-17 DIAGNOSIS — I10 ESSENTIAL HYPERTENSION: ICD-10-CM

## 2025-06-17 DIAGNOSIS — I87.2 VENOUS STASIS ULCER LIMITED TO BREAKDOWN OF SKIN WITHOUT VARICOSE VEINS, UNSPECIFIED SITE (HCC): ICD-10-CM

## 2025-06-17 DIAGNOSIS — L97.901 VENOUS STASIS ULCER LIMITED TO BREAKDOWN OF SKIN WITHOUT VARICOSE VEINS, UNSPECIFIED SITE (HCC): ICD-10-CM

## 2025-06-17 PROCEDURE — G0439 PPPS, SUBSEQ VISIT: HCPCS | Performed by: FAMILY MEDICINE

## 2025-06-17 PROCEDURE — 11042 DBRDMT SUBQ TIS 1ST 20SQCM/<: CPT

## 2025-06-17 PROCEDURE — 1124F ACP DISCUSS-NO DSCNMKR DOCD: CPT | Performed by: FAMILY MEDICINE

## 2025-06-17 SDOH — ECONOMIC STABILITY: FOOD INSECURITY: WITHIN THE PAST 12 MONTHS, YOU WORRIED THAT YOUR FOOD WOULD RUN OUT BEFORE YOU GOT MONEY TO BUY MORE.: NEVER TRUE

## 2025-06-17 SDOH — ECONOMIC STABILITY: FOOD INSECURITY: WITHIN THE PAST 12 MONTHS, THE FOOD YOU BOUGHT JUST DIDN'T LAST AND YOU DIDN'T HAVE MONEY TO GET MORE.: NEVER TRUE

## 2025-06-17 SDOH — ECONOMIC STABILITY: INCOME INSECURITY: IN THE LAST 12 MONTHS, WAS THERE A TIME WHEN YOU WERE NOT ABLE TO PAY THE MORTGAGE OR RENT ON TIME?: NO

## 2025-06-17 SDOH — HEALTH STABILITY: PHYSICAL HEALTH: ON AVERAGE, HOW MANY DAYS PER WEEK DO YOU ENGAGE IN MODERATE TO STRENUOUS EXERCISE (LIKE A BRISK WALK)?: 0 DAYS

## 2025-06-17 SDOH — HEALTH STABILITY: PHYSICAL HEALTH: ON AVERAGE, HOW MANY MINUTES DO YOU ENGAGE IN EXERCISE AT THIS LEVEL?: 0 MIN

## 2025-06-17 ASSESSMENT — LIFESTYLE VARIABLES
HOW OFTEN DURING THE LAST YEAR HAVE YOU HAD A FEELING OF GUILT OR REMORSE AFTER DRINKING: NEVER
HOW OFTEN DURING THE LAST YEAR HAVE YOU FOUND THAT YOU WERE NOT ABLE TO STOP DRINKING ONCE YOU HAD STARTED: NEVER
HOW OFTEN DURING THE LAST YEAR HAVE YOU FAILED TO DO WHAT WAS NORMALLY EXPECTED FROM YOU BECAUSE OF DRINKING: NEVER
HOW OFTEN DURING THE LAST YEAR HAVE YOU FOUND THAT YOU WERE NOT ABLE TO STOP DRINKING ONCE YOU HAD STARTED: NEVER
HOW OFTEN DURING THE LAST YEAR HAVE YOU NEEDED AN ALCOHOLIC DRINK FIRST THING IN THE MORNING TO GET YOURSELF GOING AFTER A NIGHT OF HEAVY DRINKING: NEVER
HOW OFTEN DURING THE LAST YEAR HAVE YOU HAD A FEELING OF GUILT OR REMORSE AFTER DRINKING: NEVER
HOW MANY STANDARD DRINKS CONTAINING ALCOHOL DO YOU HAVE ON A TYPICAL DAY: 2
HAS A RELATIVE, FRIEND, DOCTOR, OR ANOTHER HEALTH PROFESSIONAL EXPRESSED CONCERN ABOUT YOUR DRINKING OR SUGGESTED YOU CUT DOWN: NO
HOW OFTEN DURING THE LAST YEAR HAVE YOU BEEN UNABLE TO REMEMBER WHAT HAPPENED THE NIGHT BEFORE BECAUSE YOU HAD BEEN DRINKING: NEVER
HOW OFTEN DURING THE LAST YEAR HAVE YOU BEEN UNABLE TO REMEMBER WHAT HAPPENED THE NIGHT BEFORE BECAUSE YOU HAD BEEN DRINKING: NEVER
HAVE YOU OR SOMEONE ELSE BEEN INJURED AS A RESULT OF YOUR DRINKING: NO
HAVE YOU OR SOMEONE ELSE BEEN INJURED AS A RESULT OF YOUR DRINKING: NO
HAS A RELATIVE, FRIEND, DOCTOR, OR ANOTHER HEALTH PROFESSIONAL EXPRESSED CONCERN ABOUT YOUR DRINKING OR SUGGESTED YOU CUT DOWN: NO
HOW OFTEN DURING THE LAST YEAR HAVE YOU NEEDED AN ALCOHOLIC DRINK FIRST THING IN THE MORNING TO GET YOURSELF GOING AFTER A NIGHT OF HEAVY DRINKING: NEVER
HOW OFTEN DURING THE LAST YEAR HAVE YOU FAILED TO DO WHAT WAS NORMALLY EXPECTED FROM YOU BECAUSE OF DRINKING: NEVER
HOW OFTEN DO YOU HAVE A DRINK CONTAINING ALCOHOL: 5
HOW MANY STANDARD DRINKS CONTAINING ALCOHOL DO YOU HAVE ON A TYPICAL DAY: 3 OR 4
HOW OFTEN DO YOU HAVE SIX OR MORE DRINKS ON ONE OCCASION: 1
HOW OFTEN DO YOU HAVE A DRINK CONTAINING ALCOHOL: 4 OR MORE TIMES A WEEK

## 2025-06-17 ASSESSMENT — PATIENT HEALTH QUESTIONNAIRE - PHQ9
SUM OF ALL RESPONSES TO PHQ QUESTIONS 1-9: 0
2. FEELING DOWN, DEPRESSED OR HOPELESS: NOT AT ALL
SUM OF ALL RESPONSES TO PHQ QUESTIONS 1-9: 0
1. LITTLE INTEREST OR PLEASURE IN DOING THINGS: NOT AT ALL
SUM OF ALL RESPONSES TO PHQ QUESTIONS 1-9: 0
SUM OF ALL RESPONSES TO PHQ QUESTIONS 1-9: 0

## 2025-06-17 NOTE — PROGRESS NOTES
Medicare Annual Wellness Visit    Omar Zayas is here for Medicare AWV (No concerns)    Assessment & Plan  1. Annual wellness visit.  - His blood glucose levels are within the normal range, and his lipid profile is commendable.  - He maintains a healthy diet, devoid of processed or junk food.  - A dental appointment was recommended.  - Blood work orders have been placed for completion prior to his next visit.    2. Memory concerns.  - He reports no significant changes in memory over the years, although there is some increased forgetfulness noted.  - A cognitive evaluation, including blood tests and an MRI of the brain, was discussed as a potential next step if concerns persist.  - Speech therapy for a more extensive cognitive evaluation was also mentioned.  - He was advised to consider the use of hearing aids.    3. Hearing impairment.  - He has declined the use of hearing aids in the past.  - If he decides to pursue this, an updated hearing test can be arranged with the audiology department.  - He was encouraged to consider the use of hearing aids.  - He was advised to continue his follow-up with Dr. Rice.    4. Vision impairment.  - He has some trouble with vision and sees Dr. Rice for glaucoma management.  - He is due for another eye appointment and will continue follow-up with Dr. Rice.  - He was advised to continue his follow-up with Dr. Rice.  - Blood work orders have been placed for completion prior to his next visit.    5. Wound care.  - He continues to see a wound care specialist for ongoing issues with new wounds.  - He reports that his right leg swells more than the left.  - He receives nursing care twice a week.  - He was advised to continue his follow-up with Dr. Rice.    Follow-up  The patient will follow up in 6 months.  Medicare annual wellness visit, subsequent  -     Comprehensive Metabolic Panel; Future  -     CBC with Auto Differential; Future  -     Lipid Panel; Future  -

## 2025-06-17 NOTE — PATIENT INSTRUCTIONS
special attention to the front teeth and all surfaces of the back teeth.  Brush chewing surfaces with short back-and-forth strokes.  After you've finished, help the person rinse the remaining toothpaste from their mouth.  Where can you learn more?  Go to https://www.[employer].net/patientEd and enter F944 to learn more about \"Learning About Dental Care for Older Adults.\"  Current as of: July 31, 2024  Content Version: 14.5  © 2024-2025 [employer].   Care instructions adapted under license by [employer]. If you have questions about a medical condition or this instruction, always ask your healthcare professional. [employer] disclaims any warranty or liability for your use of this information.         Hearing Loss: Care Instructions  Overview     Hearing loss is a sudden or slow decrease in how well you hear. It can range from slight to profound. Permanent hearing loss can occur with aging. It also can happen when you are exposed long-term to loud noise. Examples include listening to loud music, riding motorcycles, or being around other loud machines.  Hearing loss can affect your work and home life. It can make you feel lonely or depressed. You may feel that you have lost your independence. But hearing aids and other devices can help you hear better and feel connected to others.  Follow-up care is a key part of your treatment and safety. Be sure to make and go to all appointments, and call your doctor if you are having problems. It's also a good idea to know your test results and keep a list of the medicines you take.  How can you care for yourself at home?  Avoid loud noises whenever possible. This helps keep your hearing from getting worse.  Always wear hearing protection around loud noises.  Wear a hearing aid as directed.  A professional can help you pick a hearing aid that will work best for you.  You can also get hearing aids over the counter for mild to moderate hearing loss.  Have

## 2025-06-27 NOTE — DISCHARGE INSTRUCTIONS
Visit Discharge/Physician Orders     Discharge condition: Stable     Assessment of pain at discharge: moderate     Anesthetic used: 4%lidocaine     Discharge to: Home     Left via:Private automobile     Accompanied by: family     ECF/HHA: [facility]- new orders     Dressing Orders: Left foot: cleanse wound with normal saline, apply plain alginate and cover with ABD's. Secure with kerlix and tape. Change 3 times weekly.     Tubigrips to both legs- apply in the morning and remove at night.     Treatment Orders: Elevate legs above heart level as much as possible.     [facility] followup visit ________2 weeks_____________________  (Please note your next appointment above and if you are unable to keep, kindly give a 24 hour notice. Thank you.)     Physician signature:__________________________        If you experience any of the following, please call the Wound Care Center during business hours:     * Increase in Pain  * Temperature over 101  * Increase in drainage from your wound  * Drainage with a foul odor  * Bleeding  * Increase in swelling  * Need for compression bandage changes due to slippage, breakthrough drainage.     If you need medical attention outside of the business hours of the Wound Care Centers please contact your PCP or go to the nearest emergency room.

## 2025-07-01 ENCOUNTER — HOSPITAL ENCOUNTER (OUTPATIENT)
Dept: WOUND CARE | Age: 89
Discharge: HOME | End: 2025-07-01
Attending: PODIATRIST | Admitting: PODIATRIST

## 2025-07-08 ENCOUNTER — HOSPITAL ENCOUNTER (OUTPATIENT)
Dept: WOUND CARE | Age: 89
Discharge: HOME | End: 2025-07-08
Attending: PODIATRIST | Admitting: PODIATRIST
Payer: MEDICARE

## 2025-07-08 VITALS
TEMPERATURE: 97.2 F | RESPIRATION RATE: 16 BRPM | HEART RATE: 62 BPM | DIASTOLIC BLOOD PRESSURE: 63 MMHG | SYSTOLIC BLOOD PRESSURE: 122 MMHG

## 2025-07-08 DIAGNOSIS — I89.0 LYMPHEDEMA: ICD-10-CM

## 2025-07-08 DIAGNOSIS — L97.912 NON-PRESSURE CHRONIC ULCER OF RIGHT LOWER LEG WITH FAT LAYER EXPOSED (HCC): Primary | ICD-10-CM

## 2025-07-08 DIAGNOSIS — L97.922 NON-PRESSURE ULCER OF LEFT LOWER EXTREMITY WITH FAT LAYER EXPOSED (HCC): ICD-10-CM

## 2025-07-08 PROCEDURE — 11042 DBRDMT SUBQ TIS 1ST 20SQCM/<: CPT | Performed by: NURSE PRACTITIONER

## 2025-07-08 PROCEDURE — 11042 DBRDMT SUBQ TIS 1ST 20SQCM/<: CPT

## 2025-07-08 NOTE — DISCHARGE INSTRUCTIONS
Visit Discharge/Physician Orders     Discharge condition: Stable     Assessment of pain at discharge: moderate     Anesthetic used: 4%lidocaine     Discharge to: Home     Left via:Private automobile     Accompanied by: family     ECF/HHA: [facility]- new orders     Dressing Orders: Left ankle: cleanse wound with normal saline, apply plain alginate and cover with ABD's. Secure with kerlix and tape. Change 3 times weekly.     Tubigrips to both legs- apply in the morning and remove at night.     Treatment Orders: Elevate legs above heart level as much as possible.     [facility] followup visit ________2 weeks_____________________  (Please note your next appointment above and if you are unable to keep, kindly give a 24 hour notice. Thank you.)     Physician signature:__________________________        If you experience any of the following, please call the Wound Care Center during business hours:     * Increase in Pain  * Temperature over 101  * Increase in drainage from your wound  * Drainage with a foul odor  * Bleeding  * Increase in swelling  * Need for compression bandage changes due to slippage, breakthrough drainage.     If you need medical attention outside of the business hours of the Wound Care Centers please contact your PCP or go to the nearest emergency room.

## 2025-07-08 NOTE — PLAN OF CARE
Problem: Cognitive:  Goal: Knowledge of wound care  Description: Knowledge of wound care  Outcome: Progressing  Goal: Understands risk factors for wounds  Description: Understands risk factors for wounds  Outcome: Progressing     Problem: Wound:  Goal: Will show signs of wound healing; wound closure and no evidence of infection  Description: Will show signs of wound healing; wound closure and no evidence of infection  Outcome: Progressing     Problem: Venous:  Goal: Signs of wound healing will improve  Description: Signs of wound healing will improve  Outcome: Progressing     Problem: Pain  Goal: Verbalizes/displays adequate comfort level or baseline comfort level  Outcome: Progressing

## 2025-07-11 PROBLEM — L97.922 NON-PRESSURE ULCER OF LEFT LOWER EXTREMITY WITH FAT LAYER EXPOSED (HCC): Status: ACTIVE | Noted: 2025-07-11

## 2025-07-11 NOTE — PROGRESS NOTES
Wound Healing Center Followup Visit Note    Referring Physician : Luis E Talley DO  Omar Zayas  MEDICAL RECORD NUMBER:  [id number]  AGE: 89 y.o.   GENDER: male  : 3/17/1934  EPISODE DATE:  2025    Subjective:     Chief Complaint   Patient presents with    Wound Check     R foot      HISTORY of PRESENT ILLNESS HPI   Omar Zayas is a 89 y.o. male who presents today in regards to follow up evaluation and treatment of wound/ulcer.  That patient's past medical, family and social hx were reviewed and changes were made if present.    History of Wound Context:  Patient with intermittent leg blisters that turn into wounds for approximately 6 months. Patient has edema and is currently on water pills. Patient is accompanied by son.    25: Patient with b/l VLU, improving. Left leg healed. dressings per nursing orders. Patient to wear compression sleeves. Apply in AM remove before bed. Patient to f/u 1 week     25: Patient with b/l VLU, improving. Left leg healed. dressings per nursing orders. Patient to wear compression sleeves. Apply in AM remove before bed. Patient to f/u 1 week     25: Patient with b/l VLU, improving. Left leg healed. New ulcer to left dorsal foot. Patient is unsure how this ulcer developed. Appears to be bulla with clear fluid. No SOI. Dressings as per nursing orders. dressings per nursing orders. Patient to wear compression sleeves. Apply in AM remove before bed. Patient to f/u 1 week     25  Dorsal foot and tight medial leg wounds healed   New left medial ankle wound   Wound debrided   Aquacel, dry dressing and bilateral spandagrip     Wound/Ulcer Pain Timing/Severity: none  Quality of pain: N/A  Severity:  0 / 10   Modifying Factors: None  Associated Signs/Symptoms: edema    Ulcer Identification:  Ulcer Type: venous  Contributing Factors: edema and lymphedema    Diabetic/Pressure/Non Pressure Ulcers only:  Ulcer: Non-Pressure ulcer, fat layer exposed    Wound:

## 2025-07-17 DIAGNOSIS — I10 ESSENTIAL HYPERTENSION: ICD-10-CM

## 2025-07-17 RX ORDER — FUROSEMIDE 20 MG/1
20 TABLET ORAL DAILY
Qty: 90 TABLET | Refills: 0 | Status: SHIPPED | OUTPATIENT
Start: 2025-07-17

## 2025-07-17 RX ORDER — LISINOPRIL 10 MG/1
10 TABLET ORAL DAILY
Qty: 90 TABLET | Refills: 0 | Status: SHIPPED | OUTPATIENT
Start: 2025-07-17

## 2025-07-17 NOTE — TELEPHONE ENCOUNTER
Name of Medication(s) Requested:  Requested Prescriptions     Pending Prescriptions Disp Refills    lisinopril (PRINIVIL;ZESTRIL) 10 MG tablet [Pharmacy Med Name: Lisinopril Oral Tablet 10 MG] 90 tablet 0     Sig: TAKE ONE TABLET BY MOUTH DAILY    furosemide (LASIX) 20 MG tablet [Pharmacy Med Name: Furosemide Oral Tablet 20 MG] 90 tablet 0     Sig: TAKE ONE TABLET BY MOUTH DAILY       Medication is on current medication list Yes    Dosage and directions were verified? Yes    Quantity verified: 90 day supply     Pharmacy Verified?  Yes    Last Appointment:  6/17/2025    Future appts:  Future Appointments   Date Time Provider Department Center   7/29/2025  2:15 PM Brady Boucher DPM [facility]   12/19/2025  2:15 PM Luis E Talley DO [facility] DEP   6/23/2026  1:00 PM Luis E Talley DO [facility] DEP        (If no appt send self scheduling link. .REFILLAPPT)  Scheduling request sent?     [] Yes  [x] No    Does patient need updated?  [] Yes  [x] No

## 2025-07-29 ENCOUNTER — HOSPITAL ENCOUNTER (OUTPATIENT)
Dept: WOUND CARE | Age: 89
Discharge: HOME | End: 2025-07-29
Attending: PODIATRIST | Admitting: PODIATRIST
Payer: MEDICARE

## 2025-07-29 VITALS
WEIGHT: 185 LBS | BODY MASS INDEX: 27.4 KG/M2 | HEART RATE: 80 BPM | DIASTOLIC BLOOD PRESSURE: 60 MMHG | SYSTOLIC BLOOD PRESSURE: 120 MMHG | TEMPERATURE: 97.3 F | HEIGHT: 69 IN | RESPIRATION RATE: 16 BRPM

## 2025-07-29 PROCEDURE — 99213 OFFICE O/P EST LOW 20 MIN: CPT

## 2025-07-29 NOTE — DISCHARGE INSTRUCTIONS
Visit Discharge/Physician Orders     Discharge condition: Stable     Assessment of pain at discharge: moderate     Anesthetic used: 4%lidocaine     Discharge to: Home     Left via:Private automobile     Accompanied by: family     ECF/HHA: [facility]     Dressing Orders: HEALED!     Tubigrips to both legs- apply in the morning and remove at night.     Treatment Orders: Elevate legs above heart level for 30 minutes, 3-4 times a day.      [facility] followup visit ________as needed  (Please note your next appointment above and if you are unable to keep, kindly give a 24 hour notice. Thank you.)     Physician signature:__________________________        If you experience any of the following, please call the Wound Care Center during business hours:     * Increase in Pain  * Temperature over 101  * Increase in drainage from your wound  * Drainage with a foul odor  * Bleeding  * Increase in swelling  * Need for compression bandage changes due to slippage, breakthrough drainage.     If you need medical attention outside of the business hours of the Wound Care Centers please contact your PCP or go to the nearest emergency room.

## 2025-07-29 NOTE — PLAN OF CARE
Problem: Cognitive:  Goal: Knowledge of wound care  Description: Knowledge of wound care  Outcome: Completed  Goal: Understands risk factors for wounds  Description: Understands risk factors for wounds  Outcome: Completed     Problem: Wound:  Goal: Will show signs of wound healing; wound closure and no evidence of infection  Description: Will show signs of wound healing; wound closure and no evidence of infection  Outcome: Completed     Problem: Pain  Goal: Verbalizes/displays adequate comfort level or baseline comfort level  Outcome: Completed

## 2025-07-31 ENCOUNTER — OFFICE VISIT (OUTPATIENT)
Dept: FAMILY MEDICINE CLINIC | Age: 89
End: 2025-07-31
Payer: MEDICARE

## 2025-07-31 VITALS
TEMPERATURE: 97.8 F | WEIGHT: 185 LBS | HEART RATE: 87 BPM | DIASTOLIC BLOOD PRESSURE: 72 MMHG | BODY MASS INDEX: 27.32 KG/M2 | RESPIRATION RATE: 16 BRPM | OXYGEN SATURATION: 90 % | SYSTOLIC BLOOD PRESSURE: 134 MMHG

## 2025-07-31 DIAGNOSIS — N28.89 RIGHT RENAL MASS: ICD-10-CM

## 2025-07-31 DIAGNOSIS — N18.32 TYPE 2 DIABETES MELLITUS WITH STAGE 3B CHRONIC KIDNEY DISEASE, WITHOUT LONG-TERM CURRENT USE OF INSULIN (HCC): ICD-10-CM

## 2025-07-31 DIAGNOSIS — E11.22 TYPE 2 DIABETES MELLITUS WITH STAGE 3B CHRONIC KIDNEY DISEASE, WITHOUT LONG-TERM CURRENT USE OF INSULIN (HCC): ICD-10-CM

## 2025-07-31 DIAGNOSIS — E55.9 VITAMIN D DEFICIENCY: ICD-10-CM

## 2025-07-31 DIAGNOSIS — E78.5 DYSLIPIDEMIA: ICD-10-CM

## 2025-07-31 DIAGNOSIS — R10.11 RIGHT UPPER QUADRANT PAIN: Primary | ICD-10-CM

## 2025-07-31 DIAGNOSIS — E53.8 FOLATE DEFICIENCY: ICD-10-CM

## 2025-07-31 DIAGNOSIS — I73.9 PAD (PERIPHERAL ARTERY DISEASE): ICD-10-CM

## 2025-07-31 DIAGNOSIS — R07.89 BURNING CHEST PAIN: ICD-10-CM

## 2025-07-31 PROCEDURE — 99214 OFFICE O/P EST MOD 30 MIN: CPT | Performed by: FAMILY MEDICINE

## 2025-07-31 PROCEDURE — 1160F RVW MEDS BY RX/DR IN RCRD: CPT | Performed by: FAMILY MEDICINE

## 2025-07-31 PROCEDURE — 1159F MED LIST DOCD IN RCRD: CPT | Performed by: FAMILY MEDICINE

## 2025-07-31 PROCEDURE — 1124F ACP DISCUSS-NO DSCNMKR DOCD: CPT | Performed by: FAMILY MEDICINE

## 2025-07-31 RX ORDER — ATORVASTATIN CALCIUM 40 MG/1
40 TABLET, FILM COATED ORAL DAILY
Qty: 90 TABLET | Refills: 0 | Status: SHIPPED | OUTPATIENT
Start: 2025-07-31

## 2025-07-31 RX ORDER — PANTOPRAZOLE SODIUM 40 MG/1
40 TABLET, DELAYED RELEASE ORAL
Qty: 30 TABLET | Refills: 0 | Status: SHIPPED | OUTPATIENT
Start: 2025-07-31

## 2025-07-31 RX ORDER — FOLIC ACID 1 MG/1
1000 TABLET ORAL DAILY
Qty: 90 TABLET | Refills: 0 | Status: SHIPPED | OUTPATIENT
Start: 2025-07-31

## 2025-07-31 NOTE — TELEPHONE ENCOUNTER
Name of Medication(s) Requested:  Requested Prescriptions     Pending Prescriptions Disp Refills    atorvastatin (LIPITOR) 40 MG tablet [Pharmacy Med Name: Atorvastatin Calcium Oral Tablet 40 MG] 90 tablet 0     Sig: TAKE ONE TABLET BY MOUTH DAILY    folic acid (FOLVITE) 1 MG tablet [Pharmacy Med Name: Folic Acid Oral Tablet 1 MG] 90 tablet 0     Sig: TAKE ONE TABLET BY MOUTH DAILY       Medication is on current medication list Yes    Dosage and directions were verified? Yes    Quantity verified: 90 day supply     Pharmacy Verified?  Yes    Last Appointment:  6/17/2025    Future appts:  Future Appointments   Date Time Provider Department Center   7/31/2025  3:30 PM Luis E Talley DO [facility] DEP   12/19/2025  2:15 PM Luis E Talley DO [facility] DEP   6/23/2026  1:00 PM Luis E Talley DO [facility] DEP        (If no appt send self scheduling link. .REFILLAPPT)  Scheduling request sent?     [] Yes  [x] No    Does patient need updated?  [] Yes  [x] No

## 2025-07-31 NOTE — PROGRESS NOTES
Omar Zayas   Patient is a 89 y.o. year old male who presents with:  Chief Complaint   Patient presents with    Abdominal Pain     Abdominal pain after eating for about 1.5 months. Last about 5 minutes Achy        History of Present Illness  The patient is a 89-year-old male who presents for an acute visit with a complaint of abdominal pain.    He has been experiencing intermittent right upper quadrant abdominal pain for the past 2 months, which has remained consistent in intensity. The pain, described as sharp, typically lasts between 3 to 5 minutes and occurs randomly, with no identifiable triggers. He experienced the pain 3 times yesterday and twice the day before, but there are also days when he is symptom-free. The pain does not hinder his daily activities, but it is noticeable. He reports no associated shortness of breath, nausea, vomiting, diarrhea, or loose stools. He has not undergone an EGD and reports no difficulty swallowing or food getting stuck in his throat. He still has his gallbladder and reports that the pain does not worsen after eating. He also reports no urinary symptoms such as burning or blood in the urine, but notes frequent urination. He reports no blood in the stool. He finds relief by resting and sitting quietly for about 10 minutes, after which the pain subsides instantly.    He also experiences a burning sensation in his chest, which often coincides with the abdominal pain.    Social History:  Alcohol: He drinks beer occasionally.    Results  Imaging   - CT of the right kidney: 2011, 1.6 cm calcified right renal mass       Review of Systems    Health Maintenance Due   Topic Date Due    DTaP/Tdap/Td vaccine (1 - Tdap) Never done    Pneumococcal 50+ years Vaccine (1 of 2 - PCV) Never done    Shingles vaccine (1 of 2) Never done    Respiratory Syncytial Virus (RSV) Pregnant or age 60 yrs+ (1 - 1-dose 75+ series) Never done    COVID-19 Vaccine (1 - 2024-25 season) Never done       Current

## 2025-08-08 DIAGNOSIS — N28.89 RIGHT RENAL MASS: ICD-10-CM

## 2025-08-08 DIAGNOSIS — R07.89 BURNING CHEST PAIN: ICD-10-CM

## 2025-08-08 DIAGNOSIS — E11.22 TYPE 2 DIABETES MELLITUS WITH STAGE 3B CHRONIC KIDNEY DISEASE, WITHOUT LONG-TERM CURRENT USE OF INSULIN (HCC): ICD-10-CM

## 2025-08-08 DIAGNOSIS — N18.32 TYPE 2 DIABETES MELLITUS WITH STAGE 3B CHRONIC KIDNEY DISEASE, WITHOUT LONG-TERM CURRENT USE OF INSULIN (HCC): ICD-10-CM

## 2025-08-08 DIAGNOSIS — E55.9 VITAMIN D DEFICIENCY: ICD-10-CM

## 2025-08-08 DIAGNOSIS — E78.5 DYSLIPIDEMIA: ICD-10-CM

## 2025-08-08 DIAGNOSIS — R10.11 RIGHT UPPER QUADRANT PAIN: ICD-10-CM

## 2025-08-08 LAB
ALBUMIN: 3.8 G/DL (ref 3.5–5.2)
ALP BLD-CCNC: 76 U/L (ref 40–129)
ALT SERPL-CCNC: <5 U/L (ref 0–50)
ANION GAP SERPL CALCULATED.3IONS-SCNC: 13 MMOL/L (ref 7–16)
AST SERPL-CCNC: 24 U/L (ref 0–50)
BASOPHILS ABSOLUTE: 0.11 K/UL (ref 0–0.2)
BASOPHILS RELATIVE PERCENT: 1 % (ref 0–2)
BILIRUB SERPL-MCNC: 0.7 MG/DL (ref 0–1.2)
BUN BLDV-MCNC: 40 MG/DL (ref 8–23)
CALCIUM SERPL-MCNC: 10.3 MG/DL (ref 8.8–10.2)
CHLORIDE BLD-SCNC: 108 MMOL/L (ref 98–107)
CHOLESTEROL, TOTAL: 146 MG/DL
CO2: 22 MMOL/L (ref 22–29)
CREAT SERPL-MCNC: 1.6 MG/DL (ref 0.7–1.2)
EOSINOPHILS ABSOLUTE: 0.29 K/UL (ref 0.05–0.5)
EOSINOPHILS RELATIVE PERCENT: 3 % (ref 0–6)
GFR, ESTIMATED: 40 ML/MIN/1.73M2
GLUCOSE BLD-MCNC: 102 MG/DL (ref 74–99)
HBA1C MFR BLD: 7.3 % (ref 4–5.6)
HCT VFR BLD CALC: 41.5 % (ref 37–54)
HDLC SERPL-MCNC: 49 MG/DL
HEMOGLOBIN: 13.4 G/DL (ref 12.5–16.5)
IMMATURE GRANULOCYTES %: 1 % (ref 0–5)
IMMATURE GRANULOCYTES ABSOLUTE: 0.05 K/UL (ref 0–0.58)
LDL CHOLESTEROL: 85 MG/DL
LIPASE: 63 U/L (ref 13–60)
LYMPHOCYTES ABSOLUTE: 1.18 K/UL (ref 1.5–4)
LYMPHOCYTES RELATIVE PERCENT: 13 % (ref 20–42)
MCH RBC QN AUTO: 32.4 PG (ref 26–35)
MCHC RBC AUTO-ENTMCNC: 32.3 G/DL (ref 32–34.5)
MCV RBC AUTO: 100.5 FL (ref 80–99.9)
MONOCYTES ABSOLUTE: 0.98 K/UL (ref 0.1–0.95)
MONOCYTES RELATIVE PERCENT: 11 % (ref 2–12)
NEUTROPHILS ABSOLUTE: 6.44 K/UL (ref 1.8–7.3)
NEUTROPHILS RELATIVE PERCENT: 71 % (ref 43–80)
PDW BLD-RTO: 14.5 % (ref 11.5–15)
PLATELET # BLD: 177 K/UL (ref 130–450)
PMV BLD AUTO: 11 FL (ref 7–12)
POTASSIUM SERPL-SCNC: 4.6 MMOL/L (ref 3.5–5.1)
RBC # BLD: 4.13 M/UL (ref 3.8–5.8)
SODIUM BLD-SCNC: 142 MMOL/L (ref 136–145)
TOTAL PROTEIN: 6.8 G/DL (ref 6.4–8.3)
TRIGL SERPL-MCNC: 61 MG/DL
VLDLC SERPL CALC-MCNC: 12 MG/DL
WBC # BLD: 9.1 K/UL (ref 4.5–11.5)

## 2025-08-24 PROBLEM — I63.512 LEFT ACUTE ARTERIAL ISCHEMIC STROKE, MCA (MIDDLE CEREBRAL ARTERY) (HCC): Status: RESOLVED | Noted: 2023-09-27 | Resolved: 2025-08-24

## 2025-09-05 ENCOUNTER — OFFICE VISIT (OUTPATIENT)
Dept: FAMILY MEDICINE CLINIC | Age: 89
End: 2025-09-05
Payer: MEDICARE

## 2025-09-05 VITALS
DIASTOLIC BLOOD PRESSURE: 64 MMHG | SYSTOLIC BLOOD PRESSURE: 114 MMHG | WEIGHT: 185 LBS | TEMPERATURE: 97.8 F | OXYGEN SATURATION: 99 % | HEART RATE: 62 BPM | RESPIRATION RATE: 16 BRPM | BODY MASS INDEX: 27.32 KG/M2

## 2025-09-05 DIAGNOSIS — I48.0 PAROXYSMAL A-FIB (HCC): ICD-10-CM

## 2025-09-05 DIAGNOSIS — C44.91 BASAL CELL CARCINOMA (BCC), UNSPECIFIED SITE: Primary | ICD-10-CM

## 2025-09-05 DIAGNOSIS — E11.628 TYPE 2 DIABETES MELLITUS WITH OTHER SKIN COMPLICATION, WITHOUT LONG-TERM CURRENT USE OF INSULIN (HCC): ICD-10-CM

## 2025-09-05 DIAGNOSIS — N18.32 TYPE 2 DIABETES MELLITUS WITH STAGE 3B CHRONIC KIDNEY DISEASE, WITHOUT LONG-TERM CURRENT USE OF INSULIN (HCC): Chronic | ICD-10-CM

## 2025-09-05 DIAGNOSIS — E11.22 TYPE 2 DIABETES MELLITUS WITH STAGE 3B CHRONIC KIDNEY DISEASE, WITHOUT LONG-TERM CURRENT USE OF INSULIN (HCC): Chronic | ICD-10-CM

## 2025-09-05 DIAGNOSIS — M79.2 NEUROPATHIC PAIN: ICD-10-CM

## 2025-09-05 DIAGNOSIS — K21.9 GASTROESOPHAGEAL REFLUX DISEASE WITHOUT ESOPHAGITIS: ICD-10-CM

## 2025-09-05 DIAGNOSIS — E53.8 FOLATE DEFICIENCY: ICD-10-CM

## 2025-09-05 DIAGNOSIS — R07.89 BURNING CHEST PAIN: ICD-10-CM

## 2025-09-05 DIAGNOSIS — E55.9 VITAMIN D DEFICIENCY: ICD-10-CM

## 2025-09-05 DIAGNOSIS — N18.32 STAGE 3B CHRONIC KIDNEY DISEASE (HCC): Chronic | ICD-10-CM

## 2025-09-05 PROBLEM — L97.912 NON-PRESSURE CHRONIC ULCER OF RIGHT LOWER LEG WITH FAT LAYER EXPOSED (HCC): Status: RESOLVED | Noted: 2025-05-13 | Resolved: 2025-09-05

## 2025-09-05 PROBLEM — L97.922 NON-PRESSURE ULCER OF LEFT LOWER EXTREMITY WITH FAT LAYER EXPOSED (HCC): Status: RESOLVED | Noted: 2025-07-11 | Resolved: 2025-09-05

## 2025-09-05 PROCEDURE — 1159F MED LIST DOCD IN RCRD: CPT | Performed by: FAMILY MEDICINE

## 2025-09-05 PROCEDURE — 3051F HG A1C>EQUAL 7.0%<8.0%: CPT | Performed by: FAMILY MEDICINE

## 2025-09-05 PROCEDURE — 99214 OFFICE O/P EST MOD 30 MIN: CPT | Performed by: FAMILY MEDICINE

## 2025-09-05 PROCEDURE — 1160F RVW MEDS BY RX/DR IN RCRD: CPT | Performed by: FAMILY MEDICINE

## 2025-09-05 PROCEDURE — 1124F ACP DISCUSS-NO DSCNMKR DOCD: CPT | Performed by: FAMILY MEDICINE

## 2025-09-05 RX ORDER — CHOLECALCIFEROL (VITAMIN D3) 50 MCG
1 TABLET ORAL DAILY
Qty: 90 TABLET | Refills: 1 | Status: SHIPPED | OUTPATIENT
Start: 2025-09-05

## 2025-09-05 RX ORDER — FOLIC ACID 1 MG/1
1000 TABLET ORAL DAILY
Qty: 90 TABLET | Refills: 1 | Status: SHIPPED | OUTPATIENT
Start: 2025-09-05

## 2025-09-05 RX ORDER — RIVAROXABAN 15 MG/1
15 TABLET, FILM COATED ORAL
Qty: 90 TABLET | Refills: 1 | Status: SHIPPED | OUTPATIENT
Start: 2025-09-05

## 2025-09-05 RX ORDER — GABAPENTIN 300 MG/1
300 CAPSULE ORAL NIGHTLY
Qty: 90 CAPSULE | Refills: 1 | Status: SHIPPED | OUTPATIENT
Start: 2025-09-05 | End: 2026-03-04

## 2025-09-05 RX ORDER — PANTOPRAZOLE SODIUM 40 MG/1
40 TABLET, DELAYED RELEASE ORAL DAILY PRN
Qty: 90 TABLET | Refills: 0 | Status: SHIPPED | OUTPATIENT
Start: 2025-09-05

## (undated) DEVICE — 6 X 9  1.75MIL 4-WALL LABGUARD: Brand: MINIGRIP COMMERCIAL LLC

## (undated) DEVICE — LUBRICANT SURG JELLY ST BACTER TUBE 4.25OZ

## (undated) DEVICE — KENDALL 450 SERIES MONITORING FOAM ELECTRODE - RECTANGULAR SHAPE ( 3/PK): Brand: KENDALL

## (undated) DEVICE — KIT BEDSIDE REVITAL OX 500ML

## (undated) DEVICE — MASK,FACE,MAXFLUIDPROTECT,SHIELD/ERLPS: Brand: MEDLINE

## (undated) DEVICE — SPONGE GZ 4IN 4IN 4 PLY N WVN AVANT

## (undated) DEVICE — TUBING, SUCTION, 1/4" X 10', STRAIGHT: Brand: MEDLINE

## (undated) DEVICE — FORCEPS BX L240CM JAW DIA2.8MM L CAP W/ NDL MIC MESH TOOTH

## (undated) DEVICE — YANKAUER,BULB TIP,W/O VENT,RIGID,STERILE: Brand: MEDLINE

## (undated) DEVICE — CONTAINER SPEC COLL 960ML POLYPR TRIANG GRAD INTAKE/OUTPUT

## (undated) DEVICE — BLOCK BITE 60FR CAREGUARD

## (undated) DEVICE — Device: Brand: DEFENDO VALVE AND CONNECTOR KIT

## (undated) DEVICE — GOWN ISOLATN REG YEL M WT MULTIPLY SIDETIE LEV 2